# Patient Record
Sex: FEMALE | Race: BLACK OR AFRICAN AMERICAN | NOT HISPANIC OR LATINO | Employment: FULL TIME | URBAN - METROPOLITAN AREA
[De-identification: names, ages, dates, MRNs, and addresses within clinical notes are randomized per-mention and may not be internally consistent; named-entity substitution may affect disease eponyms.]

---

## 2019-10-01 ENCOUNTER — LAB VISIT (OUTPATIENT)
Dept: LAB | Facility: HOSPITAL | Age: 49
End: 2019-10-01
Attending: FAMILY MEDICINE
Payer: COMMERCIAL

## 2019-10-01 ENCOUNTER — HOSPITAL ENCOUNTER (OUTPATIENT)
Dept: RADIOLOGY | Facility: HOSPITAL | Age: 49
Discharge: HOME | End: 2019-10-01
Attending: FAMILY MEDICINE
Payer: COMMERCIAL

## 2019-10-01 DIAGNOSIS — E66.01 MORBID OBESITY: ICD-10-CM

## 2019-10-01 DIAGNOSIS — I83.892 VARICOSE VEINS OF LEG WITH SWELLING, LEFT: ICD-10-CM

## 2019-10-01 DIAGNOSIS — M79.604 RIGHT LEG PAIN: Primary | ICD-10-CM

## 2019-10-01 DIAGNOSIS — M79.89 SWELLING OF LIMB: ICD-10-CM

## 2019-10-01 DIAGNOSIS — M79.605 LEFT LEG PAIN: Primary | ICD-10-CM

## 2019-10-01 DIAGNOSIS — M79.605 LEFT LEG PAIN: ICD-10-CM

## 2019-10-01 DIAGNOSIS — I83.893 SYMPTOMATIC VARICOSE VEINS OF BOTH LOWER EXTREMITIES: ICD-10-CM

## 2019-10-01 LAB
ALBUMIN SERPL BCP-MCNC: 3.6 G/DL (ref 3.5–5.2)
ALP SERPL-CCNC: 81 U/L (ref 55–135)
ALT SERPL W/O P-5'-P-CCNC: 112 U/L (ref 10–44)
ANION GAP SERPL CALC-SCNC: 8 MMOL/L (ref 8–16)
AST SERPL-CCNC: 151 U/L (ref 10–40)
BASOPHILS # BLD AUTO: 0.01 K/UL (ref 0–0.2)
BASOPHILS NFR BLD: 0.2 % (ref 0–1.9)
BILIRUB SERPL-MCNC: 0.5 MG/DL (ref 0.1–1)
BNP SERPL-MCNC: 44 PG/ML (ref 0–99)
BUN SERPL-MCNC: 14 MG/DL (ref 6–20)
CALCIUM SERPL-MCNC: 9 MG/DL (ref 8.7–10.5)
CHLORIDE SERPL-SCNC: 104 MMOL/L (ref 95–110)
CO2 SERPL-SCNC: 28 MMOL/L (ref 23–29)
CREAT SERPL-MCNC: 0.8 MG/DL (ref 0.5–1.4)
DIFFERENTIAL METHOD: ABNORMAL
EOSINOPHIL # BLD AUTO: 0.1 K/UL (ref 0–0.5)
EOSINOPHIL NFR BLD: 1.6 % (ref 0–8)
ERYTHROCYTE [DISTWIDTH] IN BLOOD BY AUTOMATED COUNT: 12.9 % (ref 11.5–14.5)
EST. GFR  (AFRICAN AMERICAN): >60 ML/MIN/1.73 M^2
EST. GFR  (NON AFRICAN AMERICAN): >60 ML/MIN/1.73 M^2
GLUCOSE SERPL-MCNC: 76 MG/DL (ref 70–110)
HCT VFR BLD AUTO: 43.6 % (ref 37–48.5)
HGB BLD-MCNC: 13.5 G/DL (ref 12–16)
LYMPHOCYTES # BLD AUTO: 1.7 K/UL (ref 1–4.8)
LYMPHOCYTES NFR BLD: 34.7 % (ref 18–48)
MCH RBC QN AUTO: 28.9 PG (ref 27–31)
MCHC RBC AUTO-ENTMCNC: 31 G/DL (ref 32–36)
MCV RBC AUTO: 93 FL (ref 82–98)
MONOCYTES # BLD AUTO: 0.4 K/UL (ref 0.3–1)
MONOCYTES NFR BLD: 7.1 % (ref 4–15)
NEUTROPHILS # BLD AUTO: 2.7 K/UL (ref 1.8–7.7)
NEUTROPHILS NFR BLD: 56.4 % (ref 38–73)
PLATELET # BLD AUTO: 102 K/UL (ref 150–350)
PMV BLD AUTO: 11.2 FL (ref 9.2–12.9)
POTASSIUM SERPL-SCNC: 4 MMOL/L (ref 3.5–5.1)
PROT SERPL-MCNC: 7.6 G/DL (ref 6–8.4)
RBC # BLD AUTO: 4.67 M/UL (ref 4–5.4)
SODIUM SERPL-SCNC: 140 MMOL/L (ref 136–145)
TSH SERPL DL<=0.005 MIU/L-ACNC: 2.18 UIU/ML (ref 0.4–4)
URATE SERPL-MCNC: 8.9 MG/DL (ref 2.4–5.7)
WBC # BLD AUTO: 4.9 K/UL (ref 3.9–12.7)

## 2019-10-01 PROCEDURE — 84550 ASSAY OF BLOOD/URIC ACID: CPT | Mod: PO

## 2019-10-01 PROCEDURE — 80061 LIPID PANEL: CPT

## 2019-10-01 PROCEDURE — 83880 ASSAY OF NATRIURETIC PEPTIDE: CPT | Mod: PO

## 2019-10-01 PROCEDURE — 93971 EXTREMITY STUDY: CPT | Mod: TC,PO,LT

## 2019-10-01 PROCEDURE — 93971 US LOWER EXTREMITY VEINS LEFT: ICD-10-PCS | Mod: 26,LT,, | Performed by: RADIOLOGY

## 2019-10-01 PROCEDURE — 83036 HEMOGLOBIN GLYCOSYLATED A1C: CPT

## 2019-10-01 PROCEDURE — 84443 ASSAY THYROID STIM HORMONE: CPT | Mod: PO

## 2019-10-01 PROCEDURE — 36415 COLL VENOUS BLD VENIPUNCTURE: CPT | Mod: PO

## 2019-10-01 PROCEDURE — 85652 RBC SED RATE AUTOMATED: CPT

## 2019-10-01 PROCEDURE — 85025 COMPLETE CBC W/AUTO DIFF WBC: CPT | Mod: PO

## 2019-10-01 PROCEDURE — 93971 EXTREMITY STUDY: CPT | Mod: 26,LT,, | Performed by: RADIOLOGY

## 2019-10-01 PROCEDURE — 80053 COMPREHEN METABOLIC PANEL: CPT | Mod: PO

## 2019-10-02 LAB
CHOLEST SERPL-MCNC: 167 MG/DL (ref 120–199)
CHOLEST/HDLC SERPL: 2.6 {RATIO} (ref 2–5)
ERYTHROCYTE [SEDIMENTATION RATE] IN BLOOD BY WESTERGREN METHOD: 34 MM/HR (ref 0–36)
ESTIMATED AVG GLUCOSE: 111 MG/DL (ref 68–131)
HBA1C MFR BLD HPLC: 5.5 % (ref 4–5.6)
HDLC SERPL-MCNC: 65 MG/DL (ref 40–75)
HDLC SERPL: 38.9 % (ref 20–50)
LDLC SERPL CALC-MCNC: 62.2 MG/DL (ref 63–159)
NONHDLC SERPL-MCNC: 102 MG/DL
TRIGL SERPL-MCNC: 199 MG/DL (ref 30–150)

## 2023-05-25 ENCOUNTER — HOSPITAL ENCOUNTER (EMERGENCY)
Facility: HOSPITAL | Age: 53
Discharge: HOME | End: 2023-05-25
Attending: EMERGENCY MEDICINE

## 2023-05-25 VITALS
HEART RATE: 90 BPM | OXYGEN SATURATION: 98 % | DIASTOLIC BLOOD PRESSURE: 83 MMHG | RESPIRATION RATE: 14 BRPM | SYSTOLIC BLOOD PRESSURE: 149 MMHG | TEMPERATURE: 98 F
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
## 2023-05-25 DIAGNOSIS — M25.522 LEFT ELBOW PAIN: ICD-10-CM

## 2023-05-25 PROCEDURE — 99283 EMERGENCY DEPT VISIT LOW MDM: CPT

## 2023-05-25 RX ORDER — NAPROXEN 500 MG/1
500 TABLET ORAL 2 TIMES DAILY WITH MEALS
Qty: 10 TABLET | Refills: 0 | Status: SHIPPED | OUTPATIENT
Start: 2023-05-25 | End: 2023-05-30

## 2023-05-25 NOTE — Clinical Note
"Selwyn Correa"Vel was seen and treated in our emergency department on 5/25/2023.  She may return to work on 05/27/2023.       If you have any questions or concerns, please don't hesitate to call.      Alvaro Richardson NP"

## 2023-05-25 NOTE — ED PROVIDER NOTES
Encounter Date: 5/25/2023       History     Chief Complaint   Patient presents with    Fall     Pt reports ground level fall this afternoon. Pt CO pain to R FA. Reports chair moved and she fell to floor. Denies dizziness or syncope.     Patient presents to ER for fall that occurred just prior to arrival.  Patient states she was sitting in a rolling chair and she attempted to get out of the chair and chair moved out from underneath her , causing patient to fall on left elbow.  She denies head trauma loss of consciousness.  Pain is been constant since onset.  She denies numbness, tingling, open wound, joint immobility, joint instability.    The history is provided by the patient.   Review of patient's allergies indicates:  No Known Allergies  History reviewed. No pertinent past medical history.  History reviewed. No pertinent surgical history.  History reviewed. No pertinent family history.     Review of Systems   Constitutional:  Negative for chills, fatigue and fever.   Respiratory:  Negative for cough and shortness of breath.    Cardiovascular:  Negative for chest pain.   Gastrointestinal:  Negative for abdominal pain, nausea and vomiting.   Musculoskeletal:  Negative for back pain, myalgias and neck pain.        +left elbow pain   Skin:  Negative for rash and wound.   Neurological:  Negative for weakness, numbness and headaches.   All other systems reviewed and are negative.    Physical Exam     Initial Vitals [05/25/23 1456]   BP Pulse Resp Temp SpO2   (!) 149/83 90 14 97.5 °F (36.4 °C) 98 %      MAP       --         Physical Exam    Nursing note and vitals reviewed.  Constitutional: She appears well-developed and well-nourished. She is not diaphoretic. No distress.   HENT:   Head: Normocephalic and atraumatic.   Eyes: Conjunctivae and EOM are normal.   Neck: Neck supple.   Normal range of motion.  Cardiovascular:  Normal rate, regular rhythm and intact distal pulses.           Pulmonary/Chest: Breath sounds  normal. No respiratory distress.   Musculoskeletal:         General: Normal range of motion.      Right shoulder: Normal.      Left shoulder: Normal.      Right upper arm: Normal.      Left upper arm: Normal.      Left elbow: No swelling, deformity or lacerations. Normal range of motion. Tenderness present.      Right forearm: Normal.      Left forearm: Normal.      Right wrist: Normal.      Left wrist: Normal.      Right hand: Normal.      Left hand: Normal.      Cervical back: Normal range of motion and neck supple.     Neurological: She is alert and oriented to person, place, and time. She has normal strength. No sensory deficit. GCS score is 15. GCS eye subscore is 4. GCS verbal subscore is 5. GCS motor subscore is 6.   Skin: Skin is warm and dry. Capillary refill takes less than 2 seconds.       ED Course   Procedures  Labs Reviewed - No data to display       Imaging Results              X-Ray Elbow Complete Left (Final result)  Result time 05/25/23 15:33:27      Final result by LIV Sanchez Sr., MD (05/25/23 15:33:27)                   Impression:      Normal study.      Electronically signed by: Kai Sanchez MD  Date:    05/25/2023  Time:    15:33               Narrative:    EXAMINATION:  XR ELBOW COMPLETE 3 VIEW LEFT    CLINICAL HISTORY:  Unspecified fall, initial encounter    COMPARISON:  None    FINDINGS:  There is no fracture. There is no dislocation.                                       Medications - No data to display               Imaging results reviewed and discussed with patient she verbalized understanding.  She is neurovascularly intact distally to left upper extremity.  +range of motion to left elbow without difficulty.  Radial pulses +2 and equal bilaterally.  Distal sensation is intact.  Discussed rest, ice, elevation, compression.  Naproxen Rx provided.  Patient agrees with plan and voices no further concerns.  Discussed signs and symptoms to return to ER.  Discussed outpatient  follow-up with her PCP.            Clinical Impression:   Final diagnoses:  [W19.XXXA] Fall  [M25.522] Left elbow pain        ED Disposition Condition    Discharge Stable          ED Prescriptions       Medication Sig Dispense Start Date End Date Auth. Provider    naproxen (NAPROSYN) 500 MG tablet Take 1 tablet (500 mg total) by mouth 2 (two) times daily with meals. for 5 days 10 tablet 5/25/2023 5/30/2023 Alvaro Richardson NP          Follow-up Information       Follow up With Specialties Details Why Contact Info    Follow-up with your PCP in 2 days.        [facility]. Emergency Medicine  As needed, If symptoms worsen [address] [facility] [address] [phone]             Alvaro Richardson NP  05/25/23 9089

## 2023-08-28 ENCOUNTER — HOSPITAL ENCOUNTER (EMERGENCY)
Facility: HOSPITAL | Age: 53
Discharge: HOME | End: 2023-08-28
Attending: EMERGENCY MEDICINE

## 2023-08-28 VITALS
HEIGHT: 64 IN | DIASTOLIC BLOOD PRESSURE: 66 MMHG | HEART RATE: 89 BPM | SYSTOLIC BLOOD PRESSURE: 164 MMHG | WEIGHT: 258.63 LBS | RESPIRATION RATE: 18 BRPM | BODY MASS INDEX: 44.15 KG/M2 | TEMPERATURE: 99 F | OXYGEN SATURATION: 99 %

## 2023-08-28 DIAGNOSIS — Z20.822 CLOSE EXPOSURE TO COVID-19 VIRUS: Primary | ICD-10-CM

## 2023-08-28 DIAGNOSIS — R06.02 SHORTNESS OF BREATH: ICD-10-CM

## 2023-08-28 LAB — SARS-COV-2 RDRP RESP QL NAA+PROBE: NEGATIVE

## 2023-08-28 PROCEDURE — 99283 EMERGENCY DEPT VISIT LOW MDM: CPT | Mod: 25

## 2023-08-28 PROCEDURE — U0002 COVID-19 LAB TEST NON-CDC: HCPCS | Performed by: NURSE PRACTITIONER

## 2023-08-28 NOTE — Clinical Note
"Selwyn Correa" Vel was seen and treated in our emergency department on 8/28/2023.  She may return to work on 08/30/2023.       If you have any questions or concerns, please don't hesitate to call.      Staci Swift, MICHAELA"

## 2023-08-29 NOTE — ED PROVIDER NOTES
History      Chief Complaint   Patient presents with    COVID-19 Concerns     Pt exposed to 5 patients with covid, reports feeling generalized body aches, SOB, and cough       Review of patient's allergies indicates:  No Known Allergies     HPI   HPI    8/28/2023, 9:07 PM   History obtained from the patient      History of Present Illness: Selwyn Crowder is a 53 y.o. female patient who presents to the Emergency Department for COVID symptoms.  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   She says that she came in because her employer told her that she needed a positive result from [facility].  No further complaints or concerns at this time.           PCP: No, Primary Doctor       Past Medical History:  No past medical history on file.      Past Surgical History:  No past surgical history on file.        Family History:  No family history on file.        Social History:  Social History     Tobacco Use    Smoking status: Not on file    Smokeless tobacco: Not on file   Substance and Sexual Activity    Alcohol use: Not on file    Drug use: Not on file    Sexual activity: Not on file       ROS     Review of Systems   Respiratory:  Positive for cough.    Cardiovascular:  Negative for chest pain.   Musculoskeletal:  Positive for arthralgias and myalgias.       Physical Exam      Initial Vitals [08/28/23 1958]   BP Pulse Resp Temp SpO2   (!) 164/66 89 18 99.1 °F (37.3 °C) 99 %      MAP       --         Physical Exam  Vital signs and nursing notes reviewed.  Constitutional: Patient is in NAD. Awake and alert. Well-developed and well-nourished.  Head: Atraumatic. Normocephalic.  Eyes:  EOM intact. Conjunctivae nl. No scleral icterus.  ENT: Mucous membranes are moist.   Neck: Supple.  No meningismus  Cardiovascular: Regular rate and rhythm. No murmurs, rubs, or gallops.   Pulmonary/Chest: No respiratory distress. Clear to auscultation bilaterally. No  "wheezing, rales, or rhonchi.  Abdominal: Soft. Non-distended. No TTP. No rebound, guarding, or rigidity. Good bowel sounds.  Genitourinary: No CVA tenderness  Musculoskeletal: Moves all extremities.   Skin: Warm and dry.  Neurological: Awake and alert. No acute focal neurological deficits are appreciated.  Psychiatric: Normal affect. Good eye contact. Appropriate in content.      ED Course          Procedures  ED Vital Signs:  Vitals:    08/28/23 1958   BP: (!) 164/66   Pulse: 89   Resp: 18   Temp: 99.1 °F (37.3 °C)   TempSrc: Oral   SpO2: 99%   Weight: 117.3 kg (258 lb 9.6 oz)   Height: 5' 4" (1.626 m)         Results for orders placed or performed during the hospital encounter of 08/28/23   COVID-19 Rapid Screening   Result Value Ref Range    SARS-CoV-2 RNA, Amplification, Qual Negative Negative             Imaging Results:  Imaging Results              X-Ray Chest 1 View (Final result)  Result time 08/28/23 20:30:20      Final result by Mark Wesley MD (08/28/23 20:30:20)                   Impression:      No acute abnormality.      Electronically signed by: Mark Wesley  Date:    08/28/2023  Time:    20:30               Narrative:    EXAMINATION:  XR CHEST 1 VIEW    CLINICAL HISTORY:  Shortness of breath    TECHNIQUE:  Single frontal view of the chest was performed.    COMPARISON:  None    FINDINGS:  The lungs are clear, with normal appearance of pulmonary vasculature and no pleural effusion or pneumothorax.    The cardiac silhouette is normal in size. The hilar and mediastinal contours are unremarkable.    Bones are intact.                                         The Emergency Provider reviewed the vital signs and test results, which are outlined above.    ED Discussion             Medication(s) given in the ER:  Medications - No data to display         Follow-up Information       Your Primary Care Doctor In 2 days.               [facility]..    Specialty: Emergency Medicine  Why: If symptoms " worsen  Contact information:  [address] [facility] [address] [phone]                                  Medication List      You have not been prescribed any medications.             Medical Decision Making        All findings were reviewed with the patient/family in detail.   All remaining questions and concerns were addressed at that time.  Patient/family has been counseled regarding the need for follow-up as well as the indication to return to the emergency room should new or worrisome developments occur.        MDM     Amount and/or Complexity of Data Reviewed  Clinical lab tests: ordered and reviewed (COVID negative)  Tests in the radiology section of CPT®: ordered and reviewed                   Clinical Impression:        ICD-10-CM ICD-9-CM   1. Close exposure to COVID-19 virus  Z20.822 V01.79   2. Shortness of breath  R06.02 786.05               Staci Swift PA-C  08/28/23 2125       Staci Swift PA-C  08/28/23 2126

## 2023-08-29 NOTE — FIRST PROVIDER EVALUATION
"Medical screening examination initiated.  I have conducted a focused provider triage encounter, findings are as follows:    Brief history of present illness:  Patient presents with body aches and shortness of breath after being exposed to 5 patients with COVID yesterday.  Onset of symptoms was today.    Vitals:    08/28/23 1958   BP: (!) 164/66   Pulse: 89   Resp: 18   Temp: 99.1 °F (37.3 °C)   TempSrc: Oral   SpO2: 99%   Weight: 117.3 kg (258 lb 9.6 oz)   Height: 5' 4" (1.626 m)       Pertinent physical exam:  No acute distress noted    Brief workup plan:  Labs and imaging    Preliminary workup initiated; this workup will be continued and followed by the physician or advanced practice provider that is assigned to the patient when roomed.  "

## 2023-09-22 ENCOUNTER — OFFICE VISIT (OUTPATIENT)
Dept: URGENT CARE | Facility: CLINIC | Age: 53
End: 2023-09-22
Payer: OTHER MISCELLANEOUS

## 2023-09-22 VITALS
HEART RATE: 84 BPM | BODY MASS INDEX: 44.05 KG/M2 | OXYGEN SATURATION: 98 % | WEIGHT: 258 LBS | HEIGHT: 64 IN | SYSTOLIC BLOOD PRESSURE: 128 MMHG | RESPIRATION RATE: 20 BRPM | TEMPERATURE: 99 F | DIASTOLIC BLOOD PRESSURE: 92 MMHG
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
## 2023-09-22 DIAGNOSIS — S40.022A ARM CONTUSION, LEFT, INITIAL ENCOUNTER: ICD-10-CM

## 2023-09-22 DIAGNOSIS — Z02.83 ENCOUNTER FOR DRUG SCREENING: ICD-10-CM

## 2023-09-22 DIAGNOSIS — S80.02XA CONTUSION OF LEFT KNEE, INITIAL ENCOUNTER: ICD-10-CM

## 2023-09-22 DIAGNOSIS — S86.911A KNEE STRAIN, RIGHT, INITIAL ENCOUNTER: ICD-10-CM

## 2023-09-22 PROCEDURE — 80305 OOH NON-DOT DRUG SCREEN: ICD-10-PCS | Mod: S$GLB,,, | Performed by: PHYSICIAN ASSISTANT

## 2023-09-22 PROCEDURE — 80305 DRUG TEST PRSMV DIR OPT OBS: CPT | Mod: S$GLB,,, | Performed by: PHYSICIAN ASSISTANT

## 2023-09-22 PROCEDURE — 73562 XR KNEE 3 VIEW BILATERAL: ICD-10-PCS | Mod: 50,S$GLB,, | Performed by: RADIOLOGY

## 2023-09-22 PROCEDURE — 73562 X-RAY EXAM OF KNEE 3: CPT | Mod: 50,S$GLB,, | Performed by: RADIOLOGY

## 2023-09-22 PROCEDURE — 99203 PR OFFICE/OUTPT VISIT, NEW, LEVL III, 30-44 MIN: ICD-10-PCS | Mod: S$GLB,,, | Performed by: PHYSICIAN ASSISTANT

## 2023-09-22 NOTE — PATIENT INSTRUCTIONS
Ice, elevate, keep in ACE bandage until pain and swelling have improved. Ibuprofen every 6 hours (with food) as needed for pain.

## 2023-09-22 NOTE — PROGRESS NOTES
Subjective:      Patient ID: Selwyn Crowder is a 53 y.o. female.    Chief Complaint: Fall    Patient's place of employment - [employer]  Patient's job title - CNA  Date of injury - 09/21/2023  Body part injured including left or right - left arm and bilateral knee  pain  Injury Mechanism - fall  What they were doing when they got hurt - walking down hallway  What they did immediately after - sat for a minute or so, then used rails to pull herself up  Pain scale right now - 8/10    Pt states she was walking down the hallway at work and slipped on wet floor. Fell onto bilateral knees and left forearm. There was no head injury or LOC. No neck or back pain. Can ambulate without difficulty. C/o bruising to left forearm and left knee. No hx of knee issues     Fall  The accident occurred 12 to 24 hours ago (Around 3pm yesterday). The fall occurred while walking. She fell from a height of 1 to 2 ft. She landed on Hard floor. There was no blood loss. The point of impact was the right knee and left knee (left arm). The pain is present in the right knee, left knee and right lower arm. The pain is at a severity of 8/10. The pain is moderate. The symptoms are aggravated by ambulation. Pertinent negatives include no abdominal pain, bowel incontinence, fever, headaches, hearing loss, hematuria, loss of consciousness, nausea, numbness, tingling, visual change or vomiting. She has tried nothing for the symptoms. The treatment provided no relief.       Constitution: Negative for fever.   Gastrointestinal:  Negative for abdominal pain, nausea, vomiting and bowel incontinence.   Genitourinary:  Negative for hematuria.   Musculoskeletal:  Positive for pain, trauma, joint pain and joint swelling.   Neurological:  Negative for headaches, loss of consciousness, numbness and tingling.     Objective:     Physical Exam  Vitals and nursing note reviewed.   Constitutional:       General: She is not in acute distress.      Appearance: She is well-developed. She is obese.   HENT:      Head: Normocephalic and atraumatic.      Nose: Nose normal.      Mouth/Throat:      Pharynx: Oropharynx is clear.   Eyes:      Pupils: Pupils are equal, round, and reactive to light.   Cardiovascular:      Rate and Rhythm: Normal rate and regular rhythm.      Pulses:           Dorsalis pedis pulses are 2+ on the right side and 2+ on the left side.      Heart sounds: Normal heart sounds.   Pulmonary:      Effort: Pulmonary effort is normal. No respiratory distress.      Breath sounds: Normal breath sounds.   Abdominal:      General: Bowel sounds are normal.      Palpations: Abdomen is soft.      Tenderness: There is no abdominal tenderness.   Musculoskeletal:         General: No deformity. Normal range of motion.      Cervical back: Normal range of motion and neck supple.      Comments: Left medial forearm 3 cm contusion. No deformity or step offs. FROM left wrist, elbow and shoulder. Normal sensation 2+ LUE radial and ulnar pulses.    Bilateral knees - exam limited by body habitus. There is no crepitus, deformity or ligamentous laxity. FROM active and passive. Normal sensation and pulses distally. Left knee with 1 inch contusion   Skin:     General: Skin is warm and dry.   Neurological:      Mental Status: She is alert and oriented to person, place, and time.   Psychiatric:         Behavior: Behavior normal.      XR KNEE 3 VIEW BILATERAL    Result Date: 9/22/2023  EXAM:    XR KNEE 3 VIEW BILATERAL CLINICAL HISTORY:  Pain status post fall COMPARISON: None FINDINGS:  This examination consists of 3 views of each knee.  There is no fracture.  There is no dislocation.  There is a moderate size joint effusion in the right knee.     1.  No fracture or dislocation 2.  There is a moderate size joint effusion in the right knee.  If additional imaging evaluation is clinically indicated, recommend consideration of an MRI examination of the right knee without IV  contrast. Finalized on: 9/22/2023 11:35 AM By:  Kai Sanchez MD [facility]# [id number]      2023-09-22 11:37:25.049    [facility]        Assessment:      1. Fall, initial encounter    2. Contusion of left knee, initial encounter    3. Knee strain, right, initial encounter    4. Arm contusion, left, initial encounter      Plan:        ACE bandage to bilateral knees - unable to place in knee brace due to body habitus. Recommend OTC nsaids. Ok to return to work, more frequent breaks as necessary.      Ana Muller PA-C  [facility]       Patient Instructions   Ice, elevate, keep in ACE bandage until pain and swelling have improved. Ibuprofen every 6 hours (with food) as needed for pain.

## 2024-03-15 ENCOUNTER — HOSPITAL ENCOUNTER (EMERGENCY)
Facility: HOSPITAL | Age: 54
Discharge: HOME | End: 2024-03-15
Attending: EMERGENCY MEDICINE
Payer: COMMERCIAL

## 2024-03-15 VITALS
RESPIRATION RATE: 18 BRPM | SYSTOLIC BLOOD PRESSURE: 192 MMHG | HEART RATE: 98 BPM | TEMPERATURE: 98 F | HEIGHT: 64 IN | WEIGHT: 256.38 LBS | BODY MASS INDEX: 43.77 KG/M2 | OXYGEN SATURATION: 99 % | DIASTOLIC BLOOD PRESSURE: 81 MMHG

## 2024-03-15 DIAGNOSIS — M19.071 PRIMARY OSTEOARTHRITIS OF RIGHT FOOT: ICD-10-CM

## 2024-03-15 DIAGNOSIS — M79.671 RIGHT FOOT PAIN: ICD-10-CM

## 2024-03-15 DIAGNOSIS — M77.31 CALCANEAL SPUR OF RIGHT FOOT: Primary | ICD-10-CM

## 2024-03-15 PROCEDURE — 99283 EMERGENCY DEPT VISIT LOW MDM: CPT | Mod: 25

## 2024-03-15 PROCEDURE — 25000003 PHARM REV CODE 250: Performed by: NURSE PRACTITIONER

## 2024-03-15 RX ORDER — KETOROLAC TROMETHAMINE 10 MG/1
10 TABLET, FILM COATED ORAL
Status: COMPLETED | OUTPATIENT
Start: 2024-03-15 | End: 2024-03-15

## 2024-03-15 RX ORDER — DICLOFENAC SODIUM 50 MG/1
50 TABLET, DELAYED RELEASE ORAL 3 TIMES DAILY PRN
Qty: 15 TABLET | Refills: 0 | Status: SHIPPED | OUTPATIENT
Start: 2024-03-15 | End: 2024-06-14 | Stop reason: SDUPTHER

## 2024-03-15 RX ADMIN — KETOROLAC TROMETHAMINE 10 MG: 10 TABLET, FILM COATED ORAL at 09:03

## 2024-03-15 NOTE — Clinical Note
"Selwyn Crowder (Toni) was seen and treated in our emergency department on 3/15/2024.  She may return to work on 03/19/2024.       If you have any questions or concerns, please don't hesitate to call.      Phill TAYLOR RN    "

## 2024-03-16 NOTE — ED PROVIDER NOTES
HISTORY     Chief Complaint   Patient presents with    Foot Pain     Right foot pain x 1 week, pt states she had glass in great toe and she tried to get it out with no success     Review of patient's allergies indicates:  No Known Allergies     HPI   The history is provided by the patient.   Foot Injury   The injury mechanism is unknown. The incident occurred several weeks ago. The pain is present in the right foot. The quality of the pain is described as aching and burning. The pain is at a severity of 5/10. The pain has been Constant since onset. Pertinent negatives include no numbness, no inability to bear weight, no loss of motion, no muscle weakness, no loss of sensation and no tingling. She reports no foreign bodies present. Nothing aggravates the symptoms. She has tried nothing for the symptoms. The treatment provided no relief.        PCP: No, Primary Doctor     Past Medical History:  No past medical history on file.     Past Surgical History:  Past Surgical History:   Procedure Laterality Date    CHOLECYSTECTOMY          Family History:  No family history on file.     Social History:  Social History     Tobacco Use    Smoking status: Never    Smokeless tobacco: Never   Substance and Sexual Activity    Alcohol use: Yes    Drug use: Never    Sexual activity: Not Currently         ROS   Review of Systems   Constitutional:  Negative for fever.   HENT:  Negative for sore throat.    Respiratory:  Negative for shortness of breath.    Cardiovascular:  Negative for chest pain.   Gastrointestinal:  Negative for nausea.   Genitourinary:  Negative for dysuria.   Musculoskeletal:  Negative for back pain.        Right foot pain.    Skin:  Negative for rash.   Neurological:  Negative for tingling, weakness and numbness.   Hematological:  Does not bruise/bleed easily.       PHYSICAL EXAM     Initial Vitals [03/15/24 1945]   BP Pulse Resp Temp SpO2   (!) 192/81 98 18 97.8 °F (36.6 °C) 99 %      MAP       --      "      Physical Exam    Constitutional: She appears well-developed and well-nourished. She is Obese . No distress.   HENT:   Head: Normocephalic and atraumatic.   Eyes: Conjunctivae are normal. Pupils are equal, round, and reactive to light.   Neck: Neck supple.   Normal range of motion.  Cardiovascular:  Normal rate, regular rhythm and normal heart sounds.           Pulmonary/Chest: Breath sounds normal.   Abdominal: Abdomen is soft. Bowel sounds are normal. She exhibits no distension. There is no abdominal tenderness. There is no rebound.   Musculoskeletal:         General: No edema. Normal range of motion.      Cervical back: Normal range of motion and neck supple.        Feet:      Neurological: She is alert and oriented to person, place, and time. She has normal strength.   Skin: Skin is warm and dry.        Psychiatric: She has a normal mood and affect.          ED COURSE   Procedures  ED ONGOING VITALS:  Vitals:    03/15/24 1945   BP: (!) 192/81   Pulse: 98   Resp: 18   Temp: 97.8 °F (36.6 °C)   TempSrc: Oral   SpO2: 99%   Weight: 116.3 kg (256 lb 6.3 oz)   Height: 5' 4" (1.626 m)         ABNORMAL LAB VALUES:  Labs Reviewed - No data to display      ALL LAB VALUES:        RADIOLOGY STUDIES:  Imaging Results              X-Ray Foot Complete Right (Final result)  Result time 03/15/24 20:33:43      Final result by Mark Wesley MD (03/15/24 20:33:43)                   Impression:      AS ABOVE      Electronically signed by: Mark Wesley  Date:    03/15/2024  Time:    20:33               Narrative:    EXAMINATION:  XR FOOT COMPLETE 3 VIEW RIGHT    CLINICAL HISTORY:  . Pain in right foot    TECHNIQUE:  AP, lateral, and oblique views of the right foot were performed.    COMPARISON:  None    FINDINGS:  SPURRING OF THE CALCANEUS POSTERIORLY AND ALONG PLANTAR ASPECT.  DORSAL MIDFOOT CORTICAL NODULARITY CONSISTENT WITH DEGENERATIVE JOINT DISEASE.  MEDIAL ANKLE WELL CORTICATED NODULARITY ALONG THE NAVICULAR AND " CALCANEUS MAY RELATE TO OSSICLE WITH QUESTION OLD INJURY.  CORTICAL NODULARITY OF THE PROXIMAL PHALANX OF THE 5TH DIGIT.  OTHERWISE NO ACUTE DISPLACED OSSEOUS ABNORMALITY                                                The above vital signs and test results have been reviewed by the emergency provider.     ED Medications:  Discharge Medication List as of 3/15/2024  8:41 PM        START taking these medications    Details   diclofenac (VOLTAREN) 50 MG EC tablet Take 1 tablet (50 mg total) by mouth 3 (three) times daily as needed., Starting Fri 3/15/2024, Print           Discharge Medications:  Discharge Medication List as of 3/15/2024  8:41 PM        START taking these medications    Details   diclofenac (VOLTAREN) 50 MG EC tablet Take 1 tablet (50 mg total) by mouth 3 (three) times daily as needed., Starting Fri 3/15/2024, Print             Follow-up Information       Schedule an appointment as soon as possible for a visit  with PCP.               [facility] - Emergency Dept..    Specialty: Emergency Medicine  Contact information:  [id number] [place] [address] [phone]                          I discussed with patient and/or family/caretaker that evaluation in the ED does not suggest any emergent or life threatening medical conditions requiring immediate intervention beyond what was provided in the ED, and I believe patient is safe for discharge. Regardless, an unremarkable evaluation in the ED does not preclude the development or presence of a serious or life threatening condition. As such, patient was instructed to return immediately for any worsening or change in current symptoms.    Pre-hypertension/Hypertension: The pt has been informed that they may have pre-hypertension or hypertension based on a blood pressure reading in the ED. I recommend that the pt call the PCP listed on their discharge instructions or a physician of their choice this week to arrange f/u for further  evaluation of possible pre-hypertension or hypertension.       MEDICAL DECISION MAKING                 CLINICAL IMPRESSION       ICD-10-CM ICD-9-CM   1. Calcaneal spur of right foot  M77.31 726.73   2. Right foot pain  M79.671 729.5   3. Primary osteoarthritis of right foot  M19.071 715.17       Disposition:   Disposition: Discharged  Condition: Stable         Fredy Jain NP  03/16/24 0978

## 2024-03-19 ENCOUNTER — OFFICE VISIT (OUTPATIENT)
Dept: PODIATRY | Facility: CLINIC | Age: 54
End: 2024-03-19
Payer: COMMERCIAL

## 2024-03-19 DIAGNOSIS — E66.01 MORBID OBESITY: ICD-10-CM

## 2024-03-19 DIAGNOSIS — M77.31 CALCANEAL SPUR OF RIGHT FOOT: ICD-10-CM

## 2024-03-19 DIAGNOSIS — M19.071 PRIMARY OSTEOARTHRITIS OF RIGHT FOOT: ICD-10-CM

## 2024-03-19 DIAGNOSIS — M25.571 PAIN IN JOINT INVOLVING RIGHT ANKLE AND FOOT: Primary | ICD-10-CM

## 2024-03-19 PROCEDURE — 99204 OFFICE O/P NEW MOD 45 MIN: CPT | Mod: S$GLB,,, | Performed by: PODIATRIST

## 2024-03-19 PROCEDURE — 99999 PR PBB SHADOW E&M-EST. PATIENT-LVL III: CPT | Mod: PBBFAC,,, | Performed by: PODIATRIST

## 2024-03-19 PROCEDURE — 1160F RVW MEDS BY RX/DR IN RCRD: CPT | Mod: CPTII,S$GLB,, | Performed by: PODIATRIST

## 2024-03-19 PROCEDURE — 1159F MED LIST DOCD IN RCRD: CPT | Mod: CPTII,S$GLB,, | Performed by: PODIATRIST

## 2024-03-19 NOTE — LETTER
March 19, 2024      [facility]  [address] [facility]  [place] [address]  Phone: [phone]  Fax: [phone]       Patient: Selwyn Crowder   YOB: 1970  Date of Visit: 03/19/2024    To Whom It May Concern:    Dawit Crowder  was at [facility] on 03/19/2024. The patient may return to work  on 03/22/2024 with restrictions walking. If you have any questions or concerns, or if I can be of further assistance, please do not hesitate to contact me.    Sincerely,          Shahnaz Lay MA

## 2024-03-19 NOTE — PROGRESS NOTES
Subjective:       Patient ID: Selwyn Crowder is a 54 y.o. female.    Chief Complaint: Heel Spurs (Heel spurs, rates pain 8, nondiabetic. Last seen urgent care )      HPI: Selwyn Crowder relates moderate to severe heel pain on the right foot.  She reports that a proximally 4 weeks ago, she fell at work after she tripped over a trash can.  Relates that she has been dealing with pain for proximally of the last 3 weeks.  She reports that she has not been to work during that period of time.  Has been evaluated by the emergency room and was told that she had spurs to her heel.  She is able to ambulate with regular shoes.  States pain has been constant.     Review of patient's allergies indicates:  No Known Allergies    History reviewed. No pertinent past medical history.    History reviewed. No pertinent family history.    Social History     Socioeconomic History    Marital status:    Tobacco Use    Smoking status: Never    Smokeless tobacco: Never   Substance and Sexual Activity    Alcohol use: Yes    Drug use: Never    Sexual activity: Not Currently       Past Surgical History:   Procedure Laterality Date    CHOLECYSTECTOMY         Review of Systems      Objective:   There were no vitals taken for this visit.    X-Ray Foot Complete Right  Narrative: EXAMINATION:  XR FOOT COMPLETE 3 VIEW RIGHT    CLINICAL HISTORY:  . Pain in right foot    TECHNIQUE:  AP, lateral, and oblique views of the right foot were performed.    COMPARISON:  None    FINDINGS:  SPURRING OF THE CALCANEUS POSTERIORLY AND ALONG PLANTAR ASPECT.  DORSAL MIDFOOT CORTICAL NODULARITY CONSISTENT WITH DEGENERATIVE JOINT DISEASE.  MEDIAL ANKLE WELL CORTICATED NODULARITY ALONG THE NAVICULAR AND CALCANEUS MAY RELATE TO OSSICLE WITH QUESTION OLD INJURY.  CORTICAL NODULARITY OF THE PROXIMAL PHALANX OF THE 5TH DIGIT.  OTHERWISE NO ACUTE DISPLACED OSSEOUS ABNORMALITY  Impression: AS ABOVE    Electronically signed by: Mark  Maryjane  Date:    03/15/2024  Time:    20:33      Physical Exam  LOWER EXTREMITY PHYSICAL EXAMINATION    VASCULAR: The right DP pulse is 2/4 and the left DP is 2/4. The right PT pulse is 2/4 and the left PT pulse is 2/4. Proximal to distal, warm to warm. No dependent rubor or elevation palor is noted. Capillary refill time is less than 3 seconds. Hair growth is appreciated to the dorsal foot and digits.    NEUROLOGY: Proprioception is intact, bilateral. Sensation to light touch is intact. Negative Tinel's Sign and negative Valleix Sign. No neurological sensations with compression of the area of Lora's Nerve in the area of the Abductor Hallucis muscle belly.    ORTHOPEDIC:  Moderate pain on palpation of the superior aspect of the posterior heel, plantar aspect of the posterior heel, medial aspect of the right foot and ankle.  Pain also noted diffusely over the dorsal aspect of the foot distally as well.  Patient has moderate pain with medial to lateral squeeze of the calcaneus.  She is able to ambulate with slip-on sandals.    DERMATOLOGY: No ecchymosis is noted.  Skin is supple, dry and intact. Skin is supple.  No hyperkeratosis noted. No calluses.  No open wounds or ulcerations are noted.  No palpable plantar fibromas noted.    Assessment:     1. Pain in joint involving right ankle and foot    2. Calcaneal spur of right foot    3. Primary osteoarthritis of right foot    4. Morbid obesity          Plan:     Pain in joint involving right ankle and foot  -     CT Ankle (Including Hindfoot) Without Contrast Right; Future; Expected date: 03/26/2024    Calcaneal spur of right foot  -     Ambulatory referral/consult to Podiatry    Primary osteoarthritis of right foot  -     Ambulatory referral/consult to Podiatry    Morbid obesity        Thorough discussion is had with the patient today concerning the diagnosis, its etiology, and the treatment algorithm at present.    I explained to the patient that etiology and all  treatment options for heel pain including rest,  ice messages, stretching exercises, strappings/tappings, NSAID's, injections, new shoegear with orthotic inserts, and/or surgical treatment. I also discussed a possible injection of steroid to help calm down the inflammation sooner. I expressed the importance of wearing the orthotics in culmination of other treatment modalities. Patient agreed to stretching exercises and inserts. I gave written and verbal instructions on heel cord stretching and this was demonstrated for the patient. Patient expressed understanding and had the patient teach back the instructions.  Patient instructed on adequate icing techniques which should be done for acute pain and inflammation.    Discussed the importance of stretching to the posterior muscle groups of the gastrocnemius and the soleus.  A stretching sheet was provided to the patient in conjunction with a Thera-Band.  I do recommend patient perform stretching exercises 4-6 times per day and holding the stretches for approximately 15-30 seconds apiece.  We discussed importance of stretching as relates to lengthening the posterior muscle group which can decrease drain on the posterior aspect of the heel as well as the plantar aspect of the heel.  This will also decrease pain associated with post static dyskinesia.  Teach back mechanism was performed with the patient demonstrating the stretching exercises.    Given patient's diffuse pain and multiple etiologies of pain triggers to palpation, would recommend CT scan to evaluate any underlying pathology such as fractures, stress fractures, or spurring which can lead to her increase in pain.    Has been prescribed anti-inflammatories at ED on 03/15/2024.  This should suffice in reducing her pain and inflammatory conditions.     Return to work is based on the patient as physician recommendation was not made initially    Future Appointments   Date Time Provider Department Center   3/21/2024   8:30 AM [facility] CT1 LIMIT 500 LBS [facility] CT SCAN [place]

## 2024-03-21 ENCOUNTER — TELEPHONE (OUTPATIENT)
Dept: PODIATRY | Facility: CLINIC | Age: 54
End: 2024-03-21
Payer: COMMERCIAL

## 2024-03-21 ENCOUNTER — HOSPITAL ENCOUNTER (OUTPATIENT)
Dept: RADIOLOGY | Facility: HOSPITAL | Age: 54
Discharge: HOME | End: 2024-03-21
Attending: PODIATRIST
Payer: COMMERCIAL

## 2024-03-21 DIAGNOSIS — M25.571 PAIN IN JOINT INVOLVING RIGHT ANKLE AND FOOT: ICD-10-CM

## 2024-03-21 PROCEDURE — 73700 CT LOWER EXTREMITY W/O DYE: CPT | Mod: 26,RT,, | Performed by: RADIOLOGY

## 2024-03-21 PROCEDURE — 73700 CT LOWER EXTREMITY W/O DYE: CPT | Mod: TC,PO,RT

## 2024-03-21 NOTE — TELEPHONE ENCOUNTER
Unable to hear each other during phone call.    ----- Message from Anselmo Dominguez sent at 3/21/2024  9:41 AM CDT -----  Contact: self  Pt is asking for an return call in reference to needing an letter to specifying what she can and can't do ,please call back at .[phone] Thx CJ

## 2024-03-22 ENCOUNTER — TELEPHONE (OUTPATIENT)
Dept: PODIATRY | Facility: CLINIC | Age: 54
End: 2024-03-22
Payer: COMMERCIAL

## 2024-03-22 DIAGNOSIS — M77.31 CALCANEAL SPUR OF RIGHT FOOT: Primary | ICD-10-CM

## 2024-03-22 DIAGNOSIS — M76.61 ACHILLES TENDINITIS OF RIGHT LOWER EXTREMITY: ICD-10-CM

## 2024-03-22 NOTE — TELEPHONE ENCOUNTER
Attempted to call patient in regards to recent CT scan.  There is no acute fracture, stress fracture or complications noted.  There is some presence of old injury but no acute pathology present.

## 2024-03-22 NOTE — TELEPHONE ENCOUNTER
Patient upset and stating she needs a release to work form. Patient informed the physician never advised her to stop working. He released her to work based on his findings from tests and examinations. Informed her it was on the paper he made today.Patient raising her voice and abruptly ended the call.      ----- Message from Anselmo Dominguez sent at 3/22/2024  3:02 PM CDT -----  Contact: self  Pt is asking for an return call in reference to needing an DrMargo, excuse to return to work on Monday states she was giving wrong information ,please call back at .[phone] Thx CJ

## 2024-04-28 ENCOUNTER — OFFICE VISIT (OUTPATIENT)
Dept: URGENT CARE | Facility: CLINIC | Age: 54
End: 2024-04-28
Payer: COMMERCIAL

## 2024-04-28 VITALS
BODY MASS INDEX: 48.7 KG/M2 | WEIGHT: 257.94 LBS | TEMPERATURE: 98 F | DIASTOLIC BLOOD PRESSURE: 60 MMHG | RESPIRATION RATE: 18 BRPM | HEIGHT: 61 IN | OXYGEN SATURATION: 100 % | SYSTOLIC BLOOD PRESSURE: 125 MMHG | HEART RATE: 76 BPM

## 2024-04-28 DIAGNOSIS — M54.31 SCIATICA OF RIGHT SIDE WITHOUT BACK PAIN: Primary | ICD-10-CM

## 2024-04-28 PROCEDURE — 99213 OFFICE O/P EST LOW 20 MIN: CPT | Mod: 25,S$GLB,,

## 2024-04-28 PROCEDURE — 96372 THER/PROPH/DIAG INJ SC/IM: CPT | Mod: S$GLB,,,

## 2024-04-28 RX ORDER — NAPROXEN 500 MG/1
500 TABLET ORAL 2 TIMES DAILY
Qty: 14 TABLET | Refills: 0 | Status: SHIPPED | OUTPATIENT
Start: 2024-04-28 | End: 2024-05-05

## 2024-04-28 RX ORDER — KETOROLAC TROMETHAMINE 30 MG/ML
30 INJECTION, SOLUTION INTRAMUSCULAR; INTRAVENOUS
Status: COMPLETED | OUTPATIENT
Start: 2024-04-28 | End: 2024-04-28

## 2024-04-28 RX ORDER — METHYLPREDNISOLONE 4 MG/1
TABLET ORAL
Qty: 21 EACH | Refills: 0 | Status: SHIPPED | OUTPATIENT
Start: 2024-04-28 | End: 2024-05-19

## 2024-04-28 RX ORDER — CYCLOBENZAPRINE HCL 5 MG
5 TABLET ORAL 3 TIMES DAILY PRN
Qty: 15 TABLET | Refills: 0 | Status: SHIPPED | OUTPATIENT
Start: 2024-04-28

## 2024-04-28 RX ADMIN — KETOROLAC TROMETHAMINE 30 MG: 30 INJECTION, SOLUTION INTRAMUSCULAR; INTRAVENOUS at 10:04

## 2024-04-28 NOTE — PATIENT INSTRUCTIONS
Apply RICE technique:   REST, ICE, COMPRESSION, ELEVATION.  Use ice to area for 20 minutes 3-4 times a day for comfort. Can also rotate with use of heat for 20 minutes.   No heavy lifting.  Back stretches as needed for pain relief.  Take Tylenol and/or Ibuprofen (if applicable) as needed for pain.  Apply analgesic rubs or patches such as biofreeze or Tiger Balm.  Go to ER if you have fever of 103 or higher, bowel/bladder incontinence, numbness, tingling, weakness to lower extremities, or if your symptoms worsen in any way.    Follow up with Orthopedics or primary care within 2 weeks if symptoms do not improve.    You received an injection of a powerful NSAID today - Toradol. Its effects will last up to 24 hours. Please do not take another NSAID (i.e. Aspirin, Ibuprofen, Aleve, Advil or Motrin) until this time tomorrow. If you continue to have pain, you may take Tylenol (acetaminophen) if you are not allergic to this medication.    The medication you have been prescribed may cause drowsiness and impair your judgement. Therefore, you should avoid driving, climbing, using machinery, etc., so as not to increase your risk of injury. Do NOT drink any alcohol while on this medication(s).     You received a steroid today. This can elevate your blood pressure, elevate your blood sugar, cause water weight gain, and cause anxiousness. If you received a steroid shot, this in addition may cause dimpling or thinning of the skin.

## 2024-04-28 NOTE — PROGRESS NOTES
"Subjective:      Patient ID: Selwyn Crowder is a 54 y.o. female.    Vitals:  height is 5' 1.42" (1.56 m) and weight is 117 kg (257 lb 15 oz). Her tympanic temperature is 97.7 °F (36.5 °C). Her blood pressure is 125/60 and her pulse is 76. Her respiration is 18 and oxygen saturation is 100%.     Chief Complaint: Leg Pain    Selwyn Crowder is a 54 y.o. female who presents for R leg pain which onset 1 week ago. She notes pain worsened yesterday. C/o an intermittent burning/aching pain. Hx of sciatica. Pain is 10/10 in severity. No injury or trauma. Pain radiates down RLE. No associated sxs included. Patient denies any fever, chills, SOB, CP, n/v/d, bladder/bowel incontinence, weakness, or numbness/tingling. OTC - ibuprofen 200 mg and Excedrin.     Leg Pain   The incident occurred 5 to 7 days ago. There was no injury mechanism. The pain is present in the right leg. The quality of the pain is described as burning and aching. The pain is at a severity of 10/10. The pain is severe. The pain has been Constant since onset. Associated symptoms include an inability to bear weight and muscle weakness. Pertinent negatives include no loss of motion, loss of sensation, numbness or tingling. Associated symptoms comments: Burning sensation . She reports no foreign bodies present. The symptoms are aggravated by movement and weight bearing. She has tried acetaminophen, immobilization and NSAIDs for the symptoms. The treatment provided no relief.       Constitution: Negative for chills and fever.   Musculoskeletal:  Positive for pain (R leg). Negative for trauma, joint pain, joint swelling, abnormal ROM of joint, muscle cramps and muscle ache.   Neurological:  Negative for numbness and tingling.      Objective:     Physical Exam   Constitutional: She is oriented to person, place, and time. She appears well-developed. She is cooperative. No distress.   HENT:   Head: Normocephalic and atraumatic.   Nose: Nose normal. "   Mouth/Throat: Oropharynx is clear and moist and mucous membranes are normal.   Eyes: Conjunctivae and lids are normal.   Neck: Trachea normal and phonation normal. Neck supple.   Cardiovascular: Normal rate, regular rhythm, normal heart sounds and normal pulses.   Pulmonary/Chest: Effort normal and breath sounds normal.   Abdominal: Normal appearance and bowel sounds are normal. She exhibits no mass. Soft.   Musculoskeletal:         General: No deformity.      Comments: R gluteal and R hip tenderness to palpation. No lumbar paraspinal tenderness. No lumbar midline tenderness. No deformity or step offs. Limited ROM due to pain. Sensation intact. NVI. (+) R SLR.   Neurological: She is alert and oriented to person, place, and time. She has normal strength and normal reflexes. No sensory deficit.   Skin: Skin is warm, dry, intact and not diaphoretic.   Psychiatric: Her speech is normal and behavior is normal. Judgment and thought content normal.   Nursing note and vitals reviewed.      Assessment:     1. Sciatica of right side without back pain        Plan:       Sciatica of right side without back pain  -     ketorolac injection 30 mg  -     naproxen (NAPROSYN) 500 MG tablet; Take 1 tablet (500 mg total) by mouth 2 (two) times daily. for 7 days  Dispense: 14 tablet; Refill: 0  -     cyclobenzaprine (FLEXERIL) 5 MG tablet; Take 1 tablet (5 mg total) by mouth 3 (three) times daily as needed for Muscle spasms.  Dispense: 15 tablet; Refill: 0  -     methylPREDNISolone (MEDROL DOSEPACK) 4 mg tablet; use as directed  Dispense: 21 each; Refill: 0      Afebrile. VSS.  Patient has no red flag symptoms such as saddle anesthesia, bowel/bladder incontinence, no personal hx of cancer, no unexplained weight loss, fevers or chills.  Imaging not indicated based on physical exam and history.    Advised patient on mild stretching exercises to alleviate symptoms.   Toradol IM given in clinic. CBC and CMP reviewed. Kidney function  reviewed.  Patient can also take Tylenol as needed for back pain.    Meds: Naproxen, Flexeril, and medrol dose pack sent to prescribed pharmacy to take as needed for muscle pain and spasms.   Return to clinic or follow-up with PCP if symptoms do not resolve.    Strict ER precautions given for back pain including numbness, tingling, saddle anesthesia, bowel or bladder incontinence or worsening back pain that does not resolve with treatment regimen.   Patient agreed with treatment plan and has no further questions at this time.

## 2024-04-28 NOTE — LETTER
April 28, 2024      [facility] [address] [place] [address]  Phone: [phone]  Fax: [phone]       Patient: Selwyn Crowder   YOB: 1970  Date of Visit: 04/28/2024    To Whom It May Concern:    Dawit Crowder  was at [facility] on 04/28/2024. The patient may return to work/school on 05/01/2024 with no restrictions. If you have any questions or concerns, or if I can be of further assistance, please do not hesitate to contact me.    Sincerely,    Orquidea Blue PA-C

## 2024-04-30 ENCOUNTER — TELEPHONE (OUTPATIENT)
Dept: URGENT CARE | Facility: CLINIC | Age: 54
End: 2024-04-30
Payer: COMMERCIAL

## 2024-04-30 DIAGNOSIS — M54.31 RIGHT SIDED SCIATICA: Primary | ICD-10-CM

## 2024-04-30 RX ORDER — GABAPENTIN 300 MG/1
300 CAPSULE ORAL 3 TIMES DAILY
Qty: 90 CAPSULE | Refills: 0 | Status: SHIPPED | OUTPATIENT
Start: 2024-04-30 | End: 2024-06-06

## 2024-04-30 NOTE — TELEPHONE ENCOUNTER
----- Message from RT Fernando sent at 4/30/2024  9:40 AM CDT -----  Regarding: Medical advice  Contact: [phone] pt    ----- Message -----  From: Remedios Jurado  Sent: 4/30/2024   9:25 AM CDT  To: #    [email or web address]     Patient is calling for Medical Advice regarding:Pt states leg isn't getting better, pain on right side from hip to foot. Also has a burning sensation and unable to walk due to the pain.    How long has patient had these symptoms:2 days    Pharmacy name and phone#:  [id number] - [place] - [address]  [address]  [place] [id number]  Phone: [phone] Fax: [phone]       Would like response via [employer]:  call back    Comments:Please call and advise    Provider Returned call 4/30/24 10:20 am. Pt states severe right leg burning pain extending from hip to ankle causing significant issues with ambulating and pain waking her multiple x nightly x 1 week. Given IM toradol in clinic 2 days ago, rx naproxen,flexeril and medrol dose sergio with out relief. Denies past hx of sciatica. Denies bowel/bladder incontinence, saddle area numbness or leg weakness. Denies back pain. Chart reviewed. Rx gabapentin sent to [facility] [place]. Recommend titrate dose up to 3x daily. Discussed side effect profile. Referral placed for back and spine clinic. Ana Muller

## 2024-04-30 NOTE — LETTER
April 30, 2024      [facility] [address] [place] [address]  Phone: [phone]  Fax: [phone]       Patient: Selwyn Crowder   YOB: 1970  Date of Visit: 04/30/2024    To Whom It May Concern:    Dawit Crowder  was at [facility] on 04/30/2024. The patient may return to work/school on 05/2/24 with no restrictions. If you have any questions or concerns, or if I can be of further assistance, please do not hesitate to contact me.    Sincerely,    Ana Muller PA-C  [facility]

## 2024-05-01 ENCOUNTER — TELEPHONE (OUTPATIENT)
Dept: URGENT CARE | Facility: CLINIC | Age: 54
End: 2024-05-01
Payer: COMMERCIAL

## 2024-05-01 NOTE — TELEPHONE ENCOUNTER
Patient seen 3 days ago for flare-up of her sciatica.  She was given Toradol at that time and prescribed Naprosyn and Medrol Dosepak.  She reach back out to the clinic yesterday saying that she had had no relief in her pain.  She was prescribed gabapentin.  Patient has had a single dose of this and is saying that her pain is not being relieved.  I encouraged her to take the gabapentin up to 3 times a day and to contact her PCP or go to the emergency room if this is not adequate.  A referral was placed for back and spine Clinic.  They can not see her until August.

## 2024-05-16 ENCOUNTER — OFFICE VISIT (OUTPATIENT)
Dept: SPINE | Facility: CLINIC | Age: 54
End: 2024-05-16
Payer: COMMERCIAL

## 2024-05-16 VITALS — BODY MASS INDEX: 48.7 KG/M2 | HEIGHT: 61 IN | WEIGHT: 257.94 LBS

## 2024-05-16 DIAGNOSIS — M54.16 RIGHT LUMBAR RADICULITIS: Primary | ICD-10-CM

## 2024-05-16 PROCEDURE — 99999 PR PBB SHADOW E&M-EST. PATIENT-LVL III: CPT | Mod: PBBFAC,,, | Performed by: PHYSICAL MEDICINE & REHABILITATION

## 2024-05-16 PROCEDURE — 3008F BODY MASS INDEX DOCD: CPT | Mod: CPTII,S$GLB,, | Performed by: PHYSICAL MEDICINE & REHABILITATION

## 2024-05-16 PROCEDURE — 1160F RVW MEDS BY RX/DR IN RCRD: CPT | Mod: CPTII,S$GLB,, | Performed by: PHYSICAL MEDICINE & REHABILITATION

## 2024-05-16 PROCEDURE — 1159F MED LIST DOCD IN RCRD: CPT | Mod: CPTII,S$GLB,, | Performed by: PHYSICAL MEDICINE & REHABILITATION

## 2024-05-16 PROCEDURE — 99204 OFFICE O/P NEW MOD 45 MIN: CPT | Mod: S$GLB,,, | Performed by: PHYSICAL MEDICINE & REHABILITATION

## 2024-05-16 RX ORDER — TRAMADOL HYDROCHLORIDE 50 MG/1
50 TABLET ORAL EVERY 6 HOURS PRN
Qty: 28 TABLET | Refills: 0 | Status: SHIPPED | OUTPATIENT
Start: 2024-05-16

## 2024-05-16 NOTE — PROGRESS NOTES
"  SUBJECTIVE:    Patient ID: Selwyn Crowder is a 54 y.o. female.    Chief Complaint: Low-back Pain    This is a 54-year-old woman who has no primary care physician.  Denies any chronic major medical problems.  No cancer history.  No history of back problems.  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  Presents with 1 month history of spontaneously occurring right leg pain that radiates to the right lateral ankle.  No significant low back pain.  No bowel or bladder dysfunction fever chills sweats or unexpected weight loss.  Went to urgent care on 04/28/2024 and received a shot of Toradol and was released on a Medrol Dosepak naproxen and Flexeril.  Later she contacted urgent Care saying that she did not respond to that treatment and gabapentin was added.  Patient does not find gabapentin particularly useful either.  She presents to me with ongoing complaints of right-sided leg pain as described above.  Pain level is 9/10 and interferes with quality of life in terms of activities of daily living recreation and social activities.  I have no imaging to review          History reviewed. No pertinent past medical history.  Social History     Socioeconomic History    Marital status:    Tobacco Use    Smoking status: Never    Smokeless tobacco: Never   Substance and Sexual Activity    Alcohol use: Yes    Drug use: Never    Sexual activity: Not Currently     Past Surgical History:   Procedure Laterality Date    CHOLECYSTECTOMY       No family history on file.  Vitals:    05/16/24 1112   Weight: 117 kg (257 lb 15 oz)   Height: 5' 1.42" (1.56 m)       Review of Systems   Constitutional:  Negative for chills, diaphoresis, fatigue, fever and unexpected weight change.   HENT:  Negative for trouble swallowing.    Eyes:  Negative for visual disturbance.   Respiratory:  Negative for shortness of breath.    Cardiovascular:  Negative for chest pain.   Gastrointestinal:  Negative for abdominal pain, constipation, nausea " and vomiting.   Genitourinary:  Negative for difficulty urinating.   Musculoskeletal:  Negative for arthralgias, back pain, gait problem, joint swelling, myalgias, neck pain and neck stiffness.   Neurological:  Negative for dizziness, speech difficulty, weakness, light-headedness, numbness and headaches.          Objective:      Physical Exam  Neurological:      Mental Status: She is alert and oriented to person, place, and time.      Comments: She is awake and in no acute distress  Mild tenderness to palpation right lumbar paraspinous musculature at the lumbosacral junction with no palpable masses  Forward flexion of the lumbar spine is to about 60° before complaint pain at the lumbosacral junction.  Extension at 10° causes mild pain on the right at the lumbosacral junction  She can toe walk normally.  Heel walking was deferred due to poor balance  Reflexes- +1-+2 reflexes at the following:   C5-Biceps   C6-Brachioradialis   C7-Triceps   L3/4-Patellar   S1-Achilles   Hilary sign is negative bilaterally  Strength testing- 5/5 strength in the following muscle groups:  C5-Elbow flexion  C6-Wrist extension  C7-Elbow extension  C8-Finger flexion  T1-Finger abduction  L2-Hip flexion  L3-Knee extension  L4-Ankle dorsiflexion  L5-Great toe extension  S1-Ankle plantar flexion    Straight leg raise on the right is positive for reproduction of right leg pain.  Straight leg raise on the left is negative                Assessment:       1. Right lumbar radiculitis           Plan:     She has a nonfocal neurological examination and no historical red flags.  She does however have a positive straight leg raise on the right.  She has not responded to reasonable conservative treatment.  I recommend an MRI of the lumbar spine.  She may be a candidate for epidural steroid injections.  In the meantime continue gabapentin and add tramadol as needed for pain.  Cautioned against long-term use of this medication.  We gave her a note to  be on light duty until further notice.  Follow-up with me after the scan      Right lumbar radiculitis  -     MRI Lumbar Spine Without Contrast; Future; Expected date: 05/16/2024  -     traMADoL (ULTRAM) 50 mg tablet; Take 1 tablet (50 mg total) by mouth every 6 (six) hours as needed for Pain.  Dispense: 28 tablet; Refill: 0

## 2024-05-16 NOTE — H&P (VIEW-ONLY)
"  SUBJECTIVE:    Patient ID: Selwyn Crowder is a 54 y.o. female.    Chief Complaint: Low-back Pain    This is a 54-year-old woman who has no primary care physician.  Denies any chronic major medical problems.  No cancer history.  No history of back problems.  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  Presents with 1 month history of spontaneously occurring right leg pain that radiates to the right lateral ankle.  No significant low back pain.  No bowel or bladder dysfunction fever chills sweats or unexpected weight loss.  Went to urgent care on 04/28/2024 and received a shot of Toradol and was released on a Medrol Dosepak naproxen and Flexeril.  Later she contacted urgent Care saying that she did not respond to that treatment and gabapentin was added.  Patient does not find gabapentin particularly useful either.  She presents to me with ongoing complaints of right-sided leg pain as described above.  Pain level is 9/10 and interferes with quality of life in terms of activities of daily living recreation and social activities.  I have no imaging to review          History reviewed. No pertinent past medical history.  Social History     Socioeconomic History    Marital status:    Tobacco Use    Smoking status: Never    Smokeless tobacco: Never   Substance and Sexual Activity    Alcohol use: Yes    Drug use: Never    Sexual activity: Not Currently     Past Surgical History:   Procedure Laterality Date    CHOLECYSTECTOMY       No family history on file.  Vitals:    05/16/24 1112   Weight: 117 kg (257 lb 15 oz)   Height: 5' 1.42" (1.56 m)       Review of Systems   Constitutional:  Negative for chills, diaphoresis, fatigue, fever and unexpected weight change.   HENT:  Negative for trouble swallowing.    Eyes:  Negative for visual disturbance.   Respiratory:  Negative for shortness of breath.    Cardiovascular:  Negative for chest pain.   Gastrointestinal:  Negative for abdominal pain, constipation, nausea " and vomiting.   Genitourinary:  Negative for difficulty urinating.   Musculoskeletal:  Negative for arthralgias, back pain, gait problem, joint swelling, myalgias, neck pain and neck stiffness.   Neurological:  Negative for dizziness, speech difficulty, weakness, light-headedness, numbness and headaches.          Objective:      Physical Exam  Neurological:      Mental Status: She is alert and oriented to person, place, and time.      Comments: She is awake and in no acute distress  Mild tenderness to palpation right lumbar paraspinous musculature at the lumbosacral junction with no palpable masses  Forward flexion of the lumbar spine is to about 60° before complaint pain at the lumbosacral junction.  Extension at 10° causes mild pain on the right at the lumbosacral junction  She can toe walk normally.  Heel walking was deferred due to poor balance  Reflexes- +1-+2 reflexes at the following:   C5-Biceps   C6-Brachioradialis   C7-Triceps   L3/4-Patellar   S1-Achilles   Hilary sign is negative bilaterally  Strength testing- 5/5 strength in the following muscle groups:  C5-Elbow flexion  C6-Wrist extension  C7-Elbow extension  C8-Finger flexion  T1-Finger abduction  L2-Hip flexion  L3-Knee extension  L4-Ankle dorsiflexion  L5-Great toe extension  S1-Ankle plantar flexion    Straight leg raise on the right is positive for reproduction of right leg pain.  Straight leg raise on the left is negative                Assessment:       1. Right lumbar radiculitis           Plan:     She has a nonfocal neurological examination and no historical red flags.  She does however have a positive straight leg raise on the right.  She has not responded to reasonable conservative treatment.  I recommend an MRI of the lumbar spine.  She may be a candidate for epidural steroid injections.  In the meantime continue gabapentin and add tramadol as needed for pain.  Cautioned against long-term use of this medication.  We gave her a note to  be on light duty until further notice.  Follow-up with me after the scan      Right lumbar radiculitis  -     MRI Lumbar Spine Without Contrast; Future; Expected date: 05/16/2024  -     traMADoL (ULTRAM) 50 mg tablet; Take 1 tablet (50 mg total) by mouth every 6 (six) hours as needed for Pain.  Dispense: 28 tablet; Refill: 0

## 2024-05-24 ENCOUNTER — HOSPITAL ENCOUNTER (OUTPATIENT)
Dept: RADIOLOGY | Facility: HOSPITAL | Age: 54
Discharge: HOME | End: 2024-05-24
Attending: PHYSICAL MEDICINE & REHABILITATION
Payer: COMMERCIAL

## 2024-05-24 DIAGNOSIS — M54.16 RIGHT LUMBAR RADICULITIS: ICD-10-CM

## 2024-05-24 PROCEDURE — 72148 MRI LUMBAR SPINE W/O DYE: CPT | Mod: TC

## 2024-05-24 PROCEDURE — 72148 MRI LUMBAR SPINE W/O DYE: CPT | Mod: 26,,, | Performed by: RADIOLOGY

## 2024-05-29 ENCOUNTER — OFFICE VISIT (OUTPATIENT)
Dept: URGENT CARE | Facility: CLINIC | Age: 54
End: 2024-05-29
Payer: COMMERCIAL

## 2024-05-29 ENCOUNTER — TELEPHONE (OUTPATIENT)
Dept: PAIN MEDICINE | Facility: CLINIC | Age: 54
End: 2024-05-29
Payer: COMMERCIAL

## 2024-05-29 VITALS
TEMPERATURE: 97 F | DIASTOLIC BLOOD PRESSURE: 54 MMHG | HEIGHT: 62 IN | HEART RATE: 75 BPM | BODY MASS INDEX: 46.14 KG/M2 | RESPIRATION RATE: 14 BRPM | WEIGHT: 250.75 LBS | OXYGEN SATURATION: 99 % | SYSTOLIC BLOOD PRESSURE: 103 MMHG

## 2024-05-29 DIAGNOSIS — M54.31 RIGHT SIDED SCIATICA: Primary | ICD-10-CM

## 2024-05-29 DIAGNOSIS — F41.9 ANXIETY: ICD-10-CM

## 2024-05-29 DIAGNOSIS — M54.16 LUMBAR RADICULOPATHY: Primary | ICD-10-CM

## 2024-05-29 PROCEDURE — 99214 OFFICE O/P EST MOD 30 MIN: CPT | Mod: S$GLB,,,

## 2024-05-29 RX ORDER — CYCLOBENZAPRINE HCL 10 MG
10 TABLET ORAL 3 TIMES DAILY PRN
Qty: 15 TABLET | Refills: 0 | Status: SHIPPED | OUTPATIENT
Start: 2024-05-29

## 2024-05-29 RX ORDER — HYDROXYZINE HYDROCHLORIDE 25 MG/1
25 TABLET, FILM COATED ORAL NIGHTLY
Qty: 10 TABLET | Refills: 0 | Status: SHIPPED | OUTPATIENT
Start: 2024-05-29

## 2024-05-29 NOTE — PATIENT INSTRUCTIONS
Apply RICE technique:   REST, ICE, COMPRESSION, ELEVATION.  Use ice to area for 20 minutes 3-4 times a day for comfort. Can also rotate with use of heat for 20 minutes.   No heavy lifting.  Back stretches as needed for pain relief.  Take Tylenol and/or Ibuprofen (if applicable) as needed for pain.  Apply analgesic rubs or patches such as biofreeze or Tiger Balm.  Go to ER if you have fever of 103 or higher, bowel/bladder incontinence, numbness, tingling, weakness to lower extremities, or if your symptoms worsen in any way.    Follow up with Orthopedics or primary care within 2 weeks if symptoms do not improve.

## 2024-05-29 NOTE — TELEPHONE ENCOUNTER
----- Message from Gabriel Redd MD sent at 5/29/2024  3:06 PM CDT -----  Please schedule for interlaminar injection L5-S1

## 2024-05-29 NOTE — PROGRESS NOTES
"Subjective:      Patient ID: Selwyn Crowder is a 54 y.o. female.    Vitals:  height is 5' 2.01" (1.575 m) and weight is 113.7 kg (250 lb 12.4 oz). Her tympanic temperature is 97 °F (36.1 °C). Her blood pressure is 103/54 (abnormal) and her pulse is 75. Her respiration is 14 and oxygen saturation is 99%.     Chief Complaint: Leg Pain    Selwyn Crowder is a 54 y.o. female who presents for R leg pain which onset 2 months ago. She notes pain worsened 3 weeks ago. Patient states she fell at work twice. Hx of sciatica. Pain is 10/10 in severity. No injury or trauma. Pain radiates down RLE. Associated sxs include numbness. Patient denies any fever, chills, SOB, CP, n/v/d, bladder/bowel incontinence, weakness, or numbness/tingling. Patient was advised to take Gabapentin. She was seen by Spine Specialist and given Tramadol. However, patient states she is still in excruciating pain. OTC - tylenol extra strength.    Leg Pain   The incident occurred more than 1 week ago. The incident occurred at work. The injury mechanism was a direct blow. The pain is present in the right hip, right leg, right foot and right ankle. The quality of the pain is described as shooting and stabbing. The pain is at a severity of 10/10. The pain is severe. The pain has been Constant since onset. Associated symptoms include a loss of sensation, numbness and tingling. Pertinent negatives include no inability to bear weight, loss of motion or muscle weakness. She reports no foreign bodies present. The symptoms are aggravated by movement. Treatments tried: gabapentin, tramadol, muscle relaxers. The treatment provided no relief.       Constitution: Negative for chills and fever.   Musculoskeletal:  Positive for pain (R leg) and back pain. Negative for trauma, joint pain, joint swelling, abnormal ROM of joint, muscle cramps and muscle ache.   Neurological:  Positive for numbness and tingling.      Objective:     Physical Exam   Constitutional: She " is oriented to person, place, and time. She appears well-developed. She is cooperative. No distress.   HENT:   Head: Normocephalic and atraumatic.   Nose: Nose normal.   Mouth/Throat: Oropharynx is clear and moist and mucous membranes are normal.   Eyes: Conjunctivae and lids are normal.   Neck: Trachea normal and phonation normal. Neck supple.   Cardiovascular: Normal rate, regular rhythm, normal heart sounds and normal pulses.   Pulmonary/Chest: Effort normal and breath sounds normal.   Abdominal: Normal appearance and bowel sounds are normal. She exhibits no mass. Soft.   Musculoskeletal:         General: No deformity.      Lumbar back: She exhibits tenderness. She exhibits no bony tenderness, no swelling and no deformity.        Back:       Comments: R gluteal and R hip tenderness to palpation. No lumbar paraspinal tenderness. No lumbar midline tenderness. No deformity or step offs. Limited ROM due to pain. Decreased sensation to L ankle. NVI. (+) R SLR.    Neurological: She is alert and oriented to person, place, and time. She has normal strength and normal reflexes. No sensory deficit.   Skin: Skin is warm, dry, intact and not diaphoretic.   Psychiatric: Her speech is normal and behavior is normal. Judgment and thought content normal. Her mood appears anxious.   Nursing note and vitals reviewed.      Assessment:     1. Right sided sciatica    2. Anxiety        Plan:       Right sided sciatica  -     cyclobenzaprine (FLEXERIL) 10 MG tablet; Take 1 tablet (10 mg total) by mouth 3 (three) times daily as needed for Muscle spasms.  Dispense: 15 tablet; Refill: 0    Anxiety  -     hydrOXYzine HCL (ATARAX) 25 MG tablet; Take 1 tablet (25 mg total) by mouth nightly.  Dispense: 10 tablet; Refill: 0        Chart reviewed.  Afebrile. VSS.  Patient has no red flag symptoms such as saddle anesthesia, bowel/bladder incontinence, no personal hx of cancer, no unexplained weight loss, fevers or chills.  This is a chronic  problem.  MRI reviewed. There is multilevel degenerative changes of the lumbar spine contribute to mild L2-L3 through L4-L5 spinal canal stenosis with mild-to-moderate neural foraminal stenosis.  Advised patient to continue Gabapentin as prescribed. Patient can also take Tylenol as needed for back pain.    Meds: Flexeril sent to prescribed pharmacy to take as needed for muscle pain and spasms.   Hydroxyzine sent for anxiety.  Patient has follow up with Spine Specialist on 6/6. She is scheduled for L5-S1 interlaminar injections.  Strict ER precautions given for back pain including numbness, tingling, saddle anesthesia, bowel or bladder incontinence or worsening back pain that does not resolve with treatment regimen.   Patient agreed with treatment plan and has no further questions at this time.

## 2024-05-29 NOTE — LETTER
May 29, 2024      [facility] [address] [place] [address]  Phone: [phone]  Fax: [phone]       Patient: Selwyn Crowder   YOB: 1970  Date of Visit: 05/29/2024    To Whom It May Concern:    Dawit Crowder  was at [facility] on 05/29/2024. The patient may return to work/school on 06/03/2024 with no restrictions. If you have any questions or concerns, or if I can be of further assistance, please do not hesitate to contact me.    Sincerely,    Orquidea Blue PA-C

## 2024-05-31 ENCOUNTER — HOSPITAL ENCOUNTER (EMERGENCY)
Facility: HOSPITAL | Age: 54
Discharge: HOME | End: 2024-05-31
Attending: EMERGENCY MEDICINE
Payer: COMMERCIAL

## 2024-05-31 ENCOUNTER — NURSE TRIAGE (OUTPATIENT)
Dept: ADMINISTRATIVE | Facility: CLINIC | Age: 54
End: 2024-05-31
Payer: COMMERCIAL

## 2024-05-31 VITALS
DIASTOLIC BLOOD PRESSURE: 82 MMHG | WEIGHT: 252.19 LBS | OXYGEN SATURATION: 98 % | HEART RATE: 76 BPM | TEMPERATURE: 98 F | SYSTOLIC BLOOD PRESSURE: 146 MMHG | BODY MASS INDEX: 46.12 KG/M2 | RESPIRATION RATE: 18 BRPM

## 2024-05-31 DIAGNOSIS — M79.672 BILATERAL FOOT PAIN: Primary | ICD-10-CM

## 2024-05-31 DIAGNOSIS — M79.671 BILATERAL FOOT PAIN: Primary | ICD-10-CM

## 2024-05-31 LAB — POCT GLUCOSE: 91 MG/DL (ref 70–110)

## 2024-05-31 PROCEDURE — 82962 GLUCOSE BLOOD TEST: CPT | Mod: ER

## 2024-05-31 PROCEDURE — 99283 EMERGENCY DEPT VISIT LOW MDM: CPT | Mod: ER

## 2024-05-31 RX ORDER — TRAMADOL HYDROCHLORIDE 50 MG/1
50 TABLET ORAL NIGHTLY PRN
Qty: 8 TABLET | Refills: 0 | Status: SHIPPED | OUTPATIENT
Start: 2024-05-31

## 2024-05-31 NOTE — Clinical Note
"Selwyn Correa" Vel was seen and treated in our emergency department on 5/31/2024.  She may return to work on 06/05/2024.       If you have any questions or concerns, please don't hesitate to call.      Staci Swift, MICHAELA"

## 2024-05-31 NOTE — TELEPHONE ENCOUNTER
UC, pain is getting worse. I am an employee and I cannot work, I am in terrible pain. UC did not do anything. Patient frustrated with care and that she cannot see provider until later this month. Triage done-see in office today. Advised UC or ED. States she has been to both and they do not do anything. Adivsed I could send message to provider. States she spoke to them yesterday and they cannot see her sooner. Number given to Patient Relations. Verb understanding.   Reason for Disposition   Weakness (i.e., loss of strength) of new-onset in foot or toes (Exceptions: Not truly weak, foot feels weak because of pain; weakness present > 2 weeks.)    Additional Information   Negative: Entire foot is cool or blue in comparison to other foot   Negative: Purple or black skin on foot or toe   Negative: Red area or streak and fever   Negative: Swollen foot and fever   Negative: Patient sounds very sick or weak to the triager   Negative: SEVERE pain (e.g., excruciating, unable to do any normal activities)   Negative: Looks like a boil, infected sore, deep ulcer, or other infected rash (spreading redness, pus)   Negative: Swollen foot  (Exceptions: Localized bump from bunions, calluses, insect bite, sting.)    Protocols used: Foot Pain-A-[place]

## 2024-06-01 NOTE — ED PROVIDER NOTES
History      Chief Complaint   Patient presents with    Leg Pain     Pt c/o B feet and ankle pain with numbness and tingling. Pt reports that she has had this complaint for over a month, but that it has gotten worse over the last week. Reported that she has a pinched nerve in her back.         Review of patient's allergies indicates:  No Known Allergies     HPI   HPI    5/31/2024, 7:14 PM   History obtained from the patient      History of Present Illness: Selwyn Crowder is a 54 y.o. female patient who presents to the Emergency Department for low back pain and bilateral foot pain for over a month.   She says she has seen specialists including Podiatry and she had a MRI of her spine.  Her main concern is returning to work and pain being worse at night when she is trying to sleep.  Denies bladder/bowel dysfunction, fever, saddle anesthesia, or focal weakness.   No further complaints or concerns at this time.           PCP: No, Primary Doctor       Past Medical History:  No past medical history on file.      Past Surgical History:  Past Surgical History:   Procedure Laterality Date    CHOLECYSTECTOMY             Family History:  No family history on file.        Social History:  Social History     Tobacco Use    Smoking status: Never    Smokeless tobacco: Never   Substance and Sexual Activity    Alcohol use: Yes    Drug use: Never    Sexual activity: Not Currently       ROS   Review of Systems   Constitutional: Negative for chills and fever.   Musculoskeletal: Positive for back pain.   Neurological: Negative for weakness and numbness.   Review of Systems    Physical Exam      Initial Vitals [05/31/24 1836]   BP Pulse Resp Temp SpO2   (!) 217/85 82 18 98 °F (36.7 °C) 100 %      MAP       --         Physical Exam  Vital signs and nursing notes reviewed.  Constitutional: Patient is in NAD. Awake and alert. Well-developed and well-nourished.  Head: Atraumatic. Normocephalic.  Eyes: PERRL. EOM intact. Conjunctivae  nl.  ENT: Mucous membranes are moist.   Neck: Supple.   Cardiovascular: Regular rate and rhythm. No murmurs, rubs, or gallops.   Pulmonary/Chest: No respiratory distress. Clear to auscultation bilaterally.   Abdominal: Soft. Non-distended. No TTP. No rebound, guarding, or rigidity.   Genitourinary: No CVA tenderness  Musculoskeletal: Moves all extremities. No edema.   Non tender c/t spine.  Lumbar spine with mild bilateral paraspinous ttp, no midline ttp or step. Bilateral feet with 2+ dorsalis pedis pulses  Skin: Warm and dry.  Neurological: Awake and alert. No acute focal neurological deficits are appreciated.  5/5 x 4 strength.  Strong and equal foot plantar and dorsiflexion.  Psychiatric: Normal affect. Good eye contact. Appropriate in content.      ED Course      Procedures  ED Vital Signs:  Vitals:    05/31/24 1836 05/31/24 1838   BP: (!) 217/85 (!) 150/73   Pulse: 82 83   Resp: 18 18   Temp: 98 °F (36.7 °C)    TempSrc: Oral    SpO2: 100%    Weight: 114.4 kg (252 lb 3.3 oz)          Results for orders placed or performed during the hospital encounter of 05/31/24   POCT glucose   Result Value Ref Range    POCT Glucose 91 70 - 110 mg/dL             Imaging Results:  Imaging Results    None            The Emergency Provider reviewed the vital signs and test results, which are outlined above.    ED Discussion         Medication(s) given in the ER:  Medications - No data to display         Follow-up Information       [place] - [facility] In 2 days.    Specialty: Internal Medicine  Contact information:  [address] [address] [address] [phone]  Additional information:  Please park in surface lot and check in at main registration.                               New Prescriptions    TRAMADOL (ULTRAM) 50 MG TABLET    Take 1 tablet (50 mg total) by mouth nightly as needed for Pain.          Medical Decision Making        All findings were reviewed with the patient/family in detail.   All  remaining questions and concerns were addressed at that time.  Patient/family has been counseled regarding the need for follow-up as well as the indication to return to the emergency room should new or worrisome developments occur.          MDM     Amount and/or Complexity of Data Reviewed  Clinical lab tests: ordered and reviewed                   Clinical Impression:        ICD-10-CM ICD-9-CM   1. Bilateral foot pain  M79.671 729.5    M79.672               Staci Swift, PAAlfredoC  05/31/24 1916

## 2024-06-03 ENCOUNTER — TELEPHONE (OUTPATIENT)
Dept: SPINE | Facility: CLINIC | Age: 54
End: 2024-06-03
Payer: COMMERCIAL

## 2024-06-03 NOTE — TELEPHONE ENCOUNTER
Spoke to nurse reviewer with [facility]. Auth number [id number]. Pt called and advised procedure date changed to 6/6/24. Pt advised to stop all blood thinner, NSAIDs and Asprin products immediately. Pt verbalized understanding.

## 2024-06-03 NOTE — TELEPHONE ENCOUNTER
Spoke to pt. She is in a great amount of pain. States she was seen in ED and given tramadol and it is not giving her relief. She is complaining of increased numbness and pain in her feet. She is concerned because she is unable to work at this time and is afraid she will be fired. She reports being unable to walk or move without pain. Reached out to Dr. Miramontes office to see if NAZIA can be done sooner. She states she has been unable to do HEP that was given to her at her office visit 5/16 due to increased pain.

## 2024-06-06 ENCOUNTER — HOSPITAL ENCOUNTER (OUTPATIENT)
Facility: HOSPITAL | Age: 54
Discharge: HOME | End: 2024-06-06
Attending: ANESTHESIOLOGY | Admitting: ANESTHESIOLOGY
Payer: COMMERCIAL

## 2024-06-06 VITALS
DIASTOLIC BLOOD PRESSURE: 56 MMHG | HEART RATE: 55 BPM | HEIGHT: 62 IN | WEIGHT: 252.19 LBS | SYSTOLIC BLOOD PRESSURE: 129 MMHG | OXYGEN SATURATION: 97 % | RESPIRATION RATE: 18 BRPM | TEMPERATURE: 98 F | BODY MASS INDEX: 46.41 KG/M2

## 2024-06-06 DIAGNOSIS — M54.16 LUMBAR RADICULITIS: ICD-10-CM

## 2024-06-06 PROCEDURE — 25000003 PHARM REV CODE 250: Performed by: ANESTHESIOLOGY

## 2024-06-06 PROCEDURE — 63600175 PHARM REV CODE 636 W HCPCS: Performed by: ANESTHESIOLOGY

## 2024-06-06 PROCEDURE — 62323 NJX INTERLAMINAR LMBR/SAC: CPT | Mod: ,,, | Performed by: ANESTHESIOLOGY

## 2024-06-06 PROCEDURE — A4216 STERILE WATER/SALINE, 10 ML: HCPCS | Performed by: ANESTHESIOLOGY

## 2024-06-06 PROCEDURE — 36000704 HC OR TIME LEV I 1ST 15 MIN: Performed by: ANESTHESIOLOGY

## 2024-06-06 PROCEDURE — 25500020 PHARM REV CODE 255: Performed by: ANESTHESIOLOGY

## 2024-06-06 RX ORDER — SODIUM CHLORIDE, SODIUM LACTATE, POTASSIUM CHLORIDE, CALCIUM CHLORIDE 600; 310; 30; 20 MG/100ML; MG/100ML; MG/100ML; MG/100ML
INJECTION, SOLUTION INTRAVENOUS CONTINUOUS
Status: DISCONTINUED | OUTPATIENT
Start: 2024-06-06 | End: 2024-06-06

## 2024-06-06 RX ORDER — DEXAMETHASONE SODIUM PHOSPHATE 10 MG/ML
INJECTION INTRAMUSCULAR; INTRAVENOUS
Status: DISCONTINUED | OUTPATIENT
Start: 2024-06-06 | End: 2024-06-06 | Stop reason: HOSPADM

## 2024-06-06 RX ORDER — ALPRAZOLAM 1 MG/1
1 TABLET, ORALLY DISINTEGRATING ORAL ONCE AS NEEDED
Status: COMPLETED | OUTPATIENT
Start: 2024-06-06 | End: 2024-06-06

## 2024-06-06 RX ORDER — LIDOCAINE HYDROCHLORIDE 10 MG/ML
INJECTION, SOLUTION EPIDURAL; INFILTRATION; INTRACAUDAL; PERINEURAL
Status: DISCONTINUED | OUTPATIENT
Start: 2024-06-06 | End: 2024-06-06 | Stop reason: HOSPADM

## 2024-06-06 RX ORDER — SODIUM CHLORIDE 9 MG/ML
INJECTION, SOLUTION INTRAMUSCULAR; INTRAVENOUS; SUBCUTANEOUS
Status: DISCONTINUED | OUTPATIENT
Start: 2024-06-06 | End: 2024-06-06 | Stop reason: HOSPADM

## 2024-06-06 RX ORDER — LIDOCAINE HYDROCHLORIDE 10 MG/ML
1 INJECTION, SOLUTION EPIDURAL; INFILTRATION; INTRACAUDAL; PERINEURAL ONCE
Status: DISCONTINUED | OUTPATIENT
Start: 2024-06-06 | End: 2024-06-06

## 2024-06-06 RX ADMIN — ALPRAZOLAM 1 MG: 1 TABLET, ORALLY DISINTEGRATING ORAL at 08:06

## 2024-06-06 NOTE — DISCHARGE SUMMARY
[facility] - Periop Services  Discharge Note  Short Stay    Procedure(s) (LRB):  Injection-steroid-epidural-lumbar l5-s1 (N/A)      OUTCOME: Patient tolerated treatment/procedure well without complication and is now ready for discharge.    DISPOSITION: Home or Self Care    FINAL DIAGNOSIS:  <principal problem not specified>    FOLLOWUP: In clinic    DISCHARGE INSTRUCTIONS:    Discharge Procedure Orders   Notify your health care provider if you experience any of the following:  temperature >100.4     Notify your health care provider if you experience any of the following:  severe uncontrolled pain     Notify your health care provider if you experience any of the following:  redness, tenderness, or signs of infection (pain, swelling, redness, odor or green/yellow discharge around incision site)     Activity as tolerated        TIME SPENT ON DISCHARGE: 30 minutes

## 2024-06-06 NOTE — OP NOTE
PROCEDURE DATE: 6/6/2024    Procedure:   Interlaminar epidural steroid injection at L5-Ss1 under fluoroscopic guidance.    Diagnosis: lUMBAR radiculitis  pOSTOP DIAGNOSIS: sAME    Physician: Kenn Miramontes M.D.    Medications injected:10 mg dexamethasone with 4 ml of preservative free NaCl    Local anesthetic injected:    Lidocaine 1% 1 ml total    Sedation Medications: None    Estimated blood loss:  None    Complications:  None    Technique:  Time-out taken to identify patient and procedure prior to starting the procedure.  With the patient laying in a prone position, the area was prepped and draped in the usual sterile fashion using ChloraPrep and a fenestrated drape.  After determining the target level with an AP fluoroscopic view, local anesthetic was given using a 25-gauge 1.5 inch needle by raising a wheal and then infiltrating toward the interlaminar entry space.  A 4.5inch 20-gauge Touhy needle was introduced under AP fluoroscopic guidance to the interlaminar space of L5-S1. Once the trajectory was established, the needle was visualized in the lateral view and advanced using loss of resistance technique. Once in the desired position, 1ml contrast was injected to confirm placement and there was no vascular uptake nor intrathecal spread.  The medication was then injected slowly. The patient tolerated the procedure well.      The patient was monitored after the procedure.   They were given post-procedure and discharge instructions to follow at home.  The patient was discharged in a stable condition.

## 2024-06-10 NOTE — PROGRESS NOTES
Pt states she is still having numbness in foot (baseline for about a month), told pt that it may take up to 2 weeks to get better, but in the meantime, she needed a doctor's note for light duty at work.  Told pt to reach out to dr blanton for this.

## 2024-06-14 RX ORDER — DICLOFENAC SODIUM 50 MG/1
50 TABLET, DELAYED RELEASE ORAL 3 TIMES DAILY PRN
Qty: 60 TABLET | Refills: 0 | Status: SHIPPED | OUTPATIENT
Start: 2024-06-14

## 2024-06-21 ENCOUNTER — OFFICE VISIT (OUTPATIENT)
Dept: SPINE | Facility: CLINIC | Age: 54
End: 2024-06-21
Payer: COMMERCIAL

## 2024-06-21 ENCOUNTER — TELEPHONE (OUTPATIENT)
Dept: PAIN MEDICINE | Facility: CLINIC | Age: 54
End: 2024-06-21
Payer: COMMERCIAL

## 2024-06-21 VITALS — HEIGHT: 62 IN | BODY MASS INDEX: 46.41 KG/M2 | WEIGHT: 252.19 LBS

## 2024-06-21 DIAGNOSIS — M54.16 LUMBAR RADICULOPATHY: Primary | ICD-10-CM

## 2024-06-21 DIAGNOSIS — M54.31 RIGHT SIDED SCIATICA: ICD-10-CM

## 2024-06-21 DIAGNOSIS — M54.16 RIGHT LUMBAR RADICULITIS: Primary | ICD-10-CM

## 2024-06-21 PROCEDURE — 99999 PR PBB SHADOW E&M-EST. PATIENT-LVL III: CPT | Mod: PBBFAC,,, | Performed by: PHYSICAL MEDICINE & REHABILITATION

## 2024-06-21 RX ORDER — CYCLOBENZAPRINE HCL 10 MG
10 TABLET ORAL 3 TIMES DAILY PRN
Qty: 60 TABLET | Refills: 0 | Status: SHIPPED | OUTPATIENT
Start: 2024-06-21

## 2024-06-21 NOTE — H&P (VIEW-ONLY)
SUBJECTIVE:    Patient ID: Selwyn Crowder is a 54 y.o. female.    Chief Complaint: Follow-up    She is here for follow-up status post interlaminar injection L5-S1 on 06/06/2024 with Dr. Miramontes.  No improvement whatsoever from that procedure.  Ongoing complaints of pain radiating down the right leg into the dorsal and lateral aspect of the right foot.  She is complaining of numbness of both feet for about 2 weeks.  Otherwise she has no new or progressive problems.  Her pain level is 8/10 and interferes with quality of life in terms of activities of daily living recreation and social activities.        We took this opportunity to formally review her lumbar MRI done 05/24/2024.  The study is summarized below:      FINDINGS:  Alignment: Within normal limits.     Vertebrae: Lumbar vertebral body heights are maintained.  Minor endplate degenerative changes throughout the lumbar spine.  No significant endplate edema.  No evidence of an acute fracture.  Marrow signal is otherwise within normal limits.     Discs: L3-L4 and L4-L5 disc desiccation.  Disc heights are maintained.     Cord: Normal.  Conus terminates at L1.     Degenerative findings:     T12-L1: No significant posterior disc bulge or spinal canal stenosis.  Facet arthropathy contributes to mild right-sided neural foraminal stenosis.     L1-L2: No significant posterior disc bulge or spinal canal stenosis.  Bilateral facet arthropathy contributes to mild bilateral neural foraminal stenosis.     L2-L3: Mild broad-based posterior disc bulge, facet arthropathy and ligamentum flavum hypertrophy contribute to mild spinal canal stenosis, moderate right and mild-to-moderate left-sided neural foraminal stenosis.     L3-L4: Mild broad-based posterior disc bulge.  Right greater than left facet arthropathy and ligamentum flavum hypertrophy contributes to mild spinal canal stenosis and right lateral recess stenosis.  There is mild-to-moderate bilateral neural foraminal  "stenosis.     L4-L5: Mild broad-based posterior disc bulge is asymmetric to the right.  Bilateral facet arthropathy and ligamentum flavum hypertrophy contribute to mild spinal canal stenosis, moderate right and mild left-sided neural foraminal stenosis.     L5-S1: Mild broad-based posterior disc bulge.  Mild bilateral facet arthropathy contributes to mild bilateral neural foraminal stenosis.  No significant spinal canal stenosis.  Moderate right anterolateral disc osteophyte complex closely approximates the exited right L5 nerve root.     Paraspinal muscles & soft tissues: Within normal limits.     Impression:     1. Multilevel degenerative changes of the lumbar spine contribute to mild L2-L3 through L4-L5 spinal canal stenosis with mild-to-moderate neural foraminal stenosis as discussed in the body of the report.            History reviewed. No pertinent past medical history.  Social History     Socioeconomic History    Marital status:    Tobacco Use    Smoking status: Never    Smokeless tobacco: Never   Substance and Sexual Activity    Alcohol use: Yes    Drug use: Never    Sexual activity: Not Currently     Past Surgical History:   Procedure Laterality Date    CHOLECYSTECTOMY      EPIDURAL STEROID INJECTION INTO LUMBAR SPINE N/A 6/6/2024    Procedure: Injection-steroid-epidural-lumbar;  Surgeon: Kenn Miramontes MD;  Location: [facility] OR;  Service: Anesthesiology;  Laterality: N/A;     No family history on file.  Vitals:    06/21/24 1142   Weight: 114.4 kg (252 lb 3.3 oz)   Height: 5' 2" (1.575 m)       Review of Systems   Constitutional:  Negative for chills, diaphoresis, fatigue, fever and unexpected weight change.   HENT:  Negative for trouble swallowing.    Eyes:  Negative for visual disturbance.   Respiratory:  Negative for shortness of breath.    Cardiovascular:  Negative for chest pain.   Gastrointestinal:  Negative for abdominal pain, constipation, nausea and vomiting.   Genitourinary:  Negative for " difficulty urinating.   Musculoskeletal:  Negative for arthralgias, back pain, gait problem, joint swelling, myalgias, neck pain and neck stiffness.   Neurological:  Negative for dizziness, speech difficulty, weakness, light-headedness, numbness and headaches.          Objective:      Physical Exam  Neurological:      Mental Status: She is alert and oriented to person, place, and time.             Assessment:       1. Right lumbar radiculitis           Plan:     No improvement status post interlaminar injection L5-S1.  We will send her for transforaminal injections on the right at L5-S1 and S1.  If she fails that then I would have no additional recommendations and she can consult with pain management or Neurosurgery at her discretion.  Consider EMG and nerve conduction studies if the numbness persists although her body habitus may be prohibitive.  Too soon to do them now.  Follow up with me after the procedure.  Refill Flexeril      Right lumbar radiculitis

## 2024-06-21 NOTE — TELEPHONE ENCOUNTER
----- Message from Gabriel Redd MD sent at 6/21/2024 12:00 PM CDT -----  Please schedule for transforaminal injection right L5-S1 and S1

## 2024-06-21 NOTE — PROGRESS NOTES
SUBJECTIVE:    Patient ID: Selwyn Crowder is a 54 y.o. female.    Chief Complaint: Follow-up    She is here for follow-up status post interlaminar injection L5-S1 on 06/06/2024 with Dr. Miramontes.  No improvement whatsoever from that procedure.  Ongoing complaints of pain radiating down the right leg into the dorsal and lateral aspect of the right foot.  She is complaining of numbness of both feet for about 2 weeks.  Otherwise she has no new or progressive problems.  Her pain level is 8/10 and interferes with quality of life in terms of activities of daily living recreation and social activities.        We took this opportunity to formally review her lumbar MRI done 05/24/2024.  The study is summarized below:      FINDINGS:  Alignment: Within normal limits.     Vertebrae: Lumbar vertebral body heights are maintained.  Minor endplate degenerative changes throughout the lumbar spine.  No significant endplate edema.  No evidence of an acute fracture.  Marrow signal is otherwise within normal limits.     Discs: L3-L4 and L4-L5 disc desiccation.  Disc heights are maintained.     Cord: Normal.  Conus terminates at L1.     Degenerative findings:     T12-L1: No significant posterior disc bulge or spinal canal stenosis.  Facet arthropathy contributes to mild right-sided neural foraminal stenosis.     L1-L2: No significant posterior disc bulge or spinal canal stenosis.  Bilateral facet arthropathy contributes to mild bilateral neural foraminal stenosis.     L2-L3: Mild broad-based posterior disc bulge, facet arthropathy and ligamentum flavum hypertrophy contribute to mild spinal canal stenosis, moderate right and mild-to-moderate left-sided neural foraminal stenosis.     L3-L4: Mild broad-based posterior disc bulge.  Right greater than left facet arthropathy and ligamentum flavum hypertrophy contributes to mild spinal canal stenosis and right lateral recess stenosis.  There is mild-to-moderate bilateral neural foraminal  "stenosis.     L4-L5: Mild broad-based posterior disc bulge is asymmetric to the right.  Bilateral facet arthropathy and ligamentum flavum hypertrophy contribute to mild spinal canal stenosis, moderate right and mild left-sided neural foraminal stenosis.     L5-S1: Mild broad-based posterior disc bulge.  Mild bilateral facet arthropathy contributes to mild bilateral neural foraminal stenosis.  No significant spinal canal stenosis.  Moderate right anterolateral disc osteophyte complex closely approximates the exited right L5 nerve root.     Paraspinal muscles & soft tissues: Within normal limits.     Impression:     1. Multilevel degenerative changes of the lumbar spine contribute to mild L2-L3 through L4-L5 spinal canal stenosis with mild-to-moderate neural foraminal stenosis as discussed in the body of the report.            History reviewed. No pertinent past medical history.  Social History     Socioeconomic History    Marital status:    Tobacco Use    Smoking status: Never    Smokeless tobacco: Never   Substance and Sexual Activity    Alcohol use: Yes    Drug use: Never    Sexual activity: Not Currently     Past Surgical History:   Procedure Laterality Date    CHOLECYSTECTOMY      EPIDURAL STEROID INJECTION INTO LUMBAR SPINE N/A 6/6/2024    Procedure: Injection-steroid-epidural-lumbar;  Surgeon: Kenn Miramontes MD;  Location: [facility] OR;  Service: Anesthesiology;  Laterality: N/A;     No family history on file.  Vitals:    06/21/24 1142   Weight: 114.4 kg (252 lb 3.3 oz)   Height: 5' 2" (1.575 m)       Review of Systems   Constitutional:  Negative for chills, diaphoresis, fatigue, fever and unexpected weight change.   HENT:  Negative for trouble swallowing.    Eyes:  Negative for visual disturbance.   Respiratory:  Negative for shortness of breath.    Cardiovascular:  Negative for chest pain.   Gastrointestinal:  Negative for abdominal pain, constipation, nausea and vomiting.   Genitourinary:  Negative for " difficulty urinating.   Musculoskeletal:  Negative for arthralgias, back pain, gait problem, joint swelling, myalgias, neck pain and neck stiffness.   Neurological:  Negative for dizziness, speech difficulty, weakness, light-headedness, numbness and headaches.          Objective:      Physical Exam  Neurological:      Mental Status: She is alert and oriented to person, place, and time.             Assessment:       1. Right lumbar radiculitis           Plan:     No improvement status post interlaminar injection L5-S1.  We will send her for transforaminal injections on the right at L5-S1 and S1.  If she fails that then I would have no additional recommendations and she can consult with pain management or Neurosurgery at her discretion.  Consider EMG and nerve conduction studies if the numbness persists although her body habitus may be prohibitive.  Too soon to do them now.  Follow up with me after the procedure.  Refill Flexeril      Right lumbar radiculitis

## 2024-06-24 ENCOUNTER — PATIENT MESSAGE (OUTPATIENT)
Dept: SPINE | Facility: CLINIC | Age: 54
End: 2024-06-24
Payer: COMMERCIAL

## 2024-06-25 ENCOUNTER — TELEPHONE (OUTPATIENT)
Dept: PAIN MEDICINE | Facility: CLINIC | Age: 54
End: 2024-06-25
Payer: COMMERCIAL

## 2024-06-25 NOTE — TELEPHONE ENCOUNTER
----- Message from Stefanie Hastings LPN sent at 6/25/2024 10:57 AM CDT -----  She would like for you to give her a call. She wants another date for her procedure

## 2024-07-10 ENCOUNTER — TELEPHONE (OUTPATIENT)
Dept: PAIN MEDICINE | Facility: CLINIC | Age: 54
End: 2024-07-10
Payer: COMMERCIAL

## 2024-07-10 NOTE — TELEPHONE ENCOUNTER
----- Message from Inga Georges sent at 7/10/2024 11:31 AM CDT -----  Regarding: call time  Contact: pt  Type: Needs Medical Advice  Who Called:  patient  Symptoms (please be specific):  call time needed put in my chart  How long has patient had these symptoms:    Pharmacy name and phone #:    Best Call Back Number: [phone]    Additional Information: call to advise

## 2024-07-10 NOTE — TELEPHONE ENCOUNTER
----- Message from Vanessa Lorenzo sent at 7/10/2024 10:16 AM CDT -----  Contact: pt  Type: Needs Medical Advice      Who Called: PT  Best Call Back Number:[phone]  Additional Information: Requesting a call back regarding  Pt needs office to call her about her appt for tomorrow for injection.  Pt seen the appt on [facility]  said no one called her with information.  Pt asking for the office to send a message via [facility] with time and everything.   Please Advise- Thank you

## 2024-07-11 ENCOUNTER — HOSPITAL ENCOUNTER (OUTPATIENT)
Facility: HOSPITAL | Age: 54
Discharge: HOME | End: 2024-07-11
Attending: ANESTHESIOLOGY | Admitting: ANESTHESIOLOGY
Payer: COMMERCIAL

## 2024-07-11 VITALS
TEMPERATURE: 98 F | RESPIRATION RATE: 18 BRPM | BODY MASS INDEX: 46.41 KG/M2 | WEIGHT: 252.19 LBS | DIASTOLIC BLOOD PRESSURE: 87 MMHG | HEIGHT: 62 IN | OXYGEN SATURATION: 97 % | HEART RATE: 83 BPM | SYSTOLIC BLOOD PRESSURE: 172 MMHG

## 2024-07-11 DIAGNOSIS — M54.16 LUMBAR RADICULITIS: ICD-10-CM

## 2024-07-11 PROCEDURE — 63600175 PHARM REV CODE 636 W HCPCS: Performed by: ANESTHESIOLOGY

## 2024-07-11 PROCEDURE — 64484 NJX AA&/STRD TFRM EPI L/S EA: CPT | Mod: RT | Performed by: ANESTHESIOLOGY

## 2024-07-11 PROCEDURE — 64484 NJX AA&/STRD TFRM EPI L/S EA: CPT | Mod: RT,,, | Performed by: ANESTHESIOLOGY

## 2024-07-11 PROCEDURE — 64483 NJX AA&/STRD TFRM EPI L/S 1: CPT | Mod: RT | Performed by: ANESTHESIOLOGY

## 2024-07-11 PROCEDURE — 25000003 PHARM REV CODE 250: Performed by: ANESTHESIOLOGY

## 2024-07-11 PROCEDURE — 25500020 PHARM REV CODE 255: Performed by: ANESTHESIOLOGY

## 2024-07-11 PROCEDURE — 64483 NJX AA&/STRD TFRM EPI L/S 1: CPT | Mod: RT,,, | Performed by: ANESTHESIOLOGY

## 2024-07-11 RX ORDER — LIDOCAINE HYDROCHLORIDE 10 MG/ML
INJECTION, SOLUTION EPIDURAL; INFILTRATION; INTRACAUDAL; PERINEURAL
Status: DISCONTINUED | OUTPATIENT
Start: 2024-07-11 | End: 2024-07-11 | Stop reason: HOSPADM

## 2024-07-11 RX ORDER — SODIUM CHLORIDE, SODIUM LACTATE, POTASSIUM CHLORIDE, CALCIUM CHLORIDE 600; 310; 30; 20 MG/100ML; MG/100ML; MG/100ML; MG/100ML
INJECTION, SOLUTION INTRAVENOUS CONTINUOUS
Status: DISCONTINUED | OUTPATIENT
Start: 2024-07-11 | End: 2024-07-11 | Stop reason: HOSPADM

## 2024-07-11 RX ORDER — BUPIVACAINE HYDROCHLORIDE 2.5 MG/ML
INJECTION, SOLUTION EPIDURAL; INFILTRATION; INTRACAUDAL
Status: DISCONTINUED | OUTPATIENT
Start: 2024-07-11 | End: 2024-07-11 | Stop reason: HOSPADM

## 2024-07-11 RX ORDER — DEXAMETHASONE SODIUM PHOSPHATE 10 MG/ML
INJECTION INTRAMUSCULAR; INTRAVENOUS
Status: DISCONTINUED | OUTPATIENT
Start: 2024-07-11 | End: 2024-07-11 | Stop reason: HOSPADM

## 2024-07-11 RX ORDER — LIDOCAINE HYDROCHLORIDE 10 MG/ML
1 INJECTION, SOLUTION EPIDURAL; INFILTRATION; INTRACAUDAL; PERINEURAL ONCE
Status: DISCONTINUED | OUTPATIENT
Start: 2024-07-11 | End: 2024-07-11 | Stop reason: HOSPADM

## 2024-07-11 RX ORDER — ALPRAZOLAM 1 MG/1
1 TABLET, ORALLY DISINTEGRATING ORAL
Status: COMPLETED | OUTPATIENT
Start: 2024-07-11 | End: 2024-07-11

## 2024-07-11 RX ADMIN — ALPRAZOLAM 1 MG: 1 TABLET, ORALLY DISINTEGRATING ORAL at 12:07

## 2024-07-11 NOTE — DISCHARGE SUMMARY
[facility] - Periop Services  Discharge Note  Short Stay    Procedure(s) (LRB):  Injection,steroid,epidural,transforaminal approach (Right)      OUTCOME: Patient tolerated treatment/procedure well without complication and is now ready for discharge.    DISPOSITION: Home or Self Care    FINAL DIAGNOSIS:  <principal problem not specified>    FOLLOWUP: In clinic    DISCHARGE INSTRUCTIONS:    Discharge Procedure Orders   Notify your health care provider if you experience any of the following:  temperature >100.4     Notify your health care provider if you experience any of the following:  severe uncontrolled pain     Notify your health care provider if you experience any of the following:  redness, tenderness, or signs of infection (pain, swelling, redness, odor or green/yellow discharge around incision site)     Activity as tolerated        TIME SPENT ON DISCHARGE: 30 minutes

## 2024-07-11 NOTE — OP NOTE
PROCEDURE DATE: 7/11/2024    PROCEDURE: Right L5-S1 and S1 transforaminal epidural steroid injection under fluoroscopy    DIAGNOSIS: Lumbar radiculitis    Post op diagnosis: Same    PHYSICIAN: Kenn Miramontes MD    MEDICATIONS INJECTED:  Dexamethasone 5mg (0.5ml) and 1.5ml 0.25% bupivicaine at each nerve root.     LOCAL ANESTHETIC INJECTED:  Lidocaine 1%. 2 ml per site.    SEDATION MEDICATIONS: RN IV sedation    ESTIMATED BLOOD LOSS:  None    COMPLICATIONS:  None    TECHNIQUE:   A time-out was taken to identify patient and procedure side prior to starting the procedure. The patient was placed in a prone position, prepped and draped in the usual sterile fashion using ChloraPrep and sterile towels.  The area to be injected was determined under fluoroscopic guidance in AP and oblique view.  Local anesthetic was given by raising a wheal and going down to the hub of a 25-gauge 1.5 inch needle.  In oblique view, a 5 inch 22-gauge bent-tip spinal needle was introduced towards 6 oclock position of the pedicle of each above named nerve root level.  The needle was walked medially then hinged into the neural foramen and position was confirmed in AP and lateral views.  1ml contrast dye was injected to confirm appropriate placement and that there was no vascular uptake.  After negative aspiration for blood or CSF, the medication was then injected. This was performed at the right L5-S1 and S1 level(s). The patient tolerated the procedure well.    The patient was monitored after the procedure.  Patient was given post procedure and discharge instructions to follow at home. The patient was discharged in a stable condition.

## 2024-07-11 NOTE — DISCHARGE INSTRUCTIONS
Pain injection instructions:     This procedure may take a couple weeks to relieve pain  You may get some pain relief from the local anesthetic initally.   Steroids can have side effects of flushed face or nervous feeling.    No driving for 24 hrs.   Activity as tolerated- gradually increase activities.  Dont lift over 10 lbs for 24 hrs   No heat at injection sites for 2 full days. No heating pads, hot tubs, saunas, or swimming in any body of water or pool for 2 full days.  Use ice pack for mild swelling and for comfort , apply for 20 minutes, remove for 20 minute intervals. No direct contact of ice itself  to skin.  May shower today.  Do not allow shower water to beat on injections site(s) for 2 full days. No tub baths for two full days.      Resume Aspirin, Plavix, or Coumadin the day after the procedure unless otherwise instructed.   If diabetic,monitor your glucose carefully as steroids can increase your glucose level    Seek immediate medical help for:     Severe increase in pain not relieved by medication or ice or any new pain in the region .    Prolonged (more than 24 hrs)or increasing weakness or numbness in the legs or arms. - it is normal to have weakness/numbness for up to 8 hrs.   Fever above 100 degrees F , Drainage,redness,active bleeding, or increased swelling at the injection site.  Headache that increases when sitting up, but decreases when lying down.  New shortness of breath, chest pain, or breathing problems.    After Surgery:  Always be aware that any surgery can cause these symptoms:    Pain- Medication can be prescribed for pain to decrease your pain but may not completely take your pain away. Over the Counter pain medicine my be enough and you can always use Ice and rest to help ease pain.    Bleeding- a little bleeding after a surgery is usually within normal.  If there is a lot of blood you need to notify your MD.  Emergency treatments of bleeding are cold application, elevation of the  bleeding site and compression.    Infection- Infection after surgery is NOT a normal occurrence.  Signs of infection are fever, swelling, hot to touch the incision.  If this occurs notify your MD immediately.    Nausea- this can be common after a surgery especially if you have had anesthesia medicine or are taking pain medicine.  Steroids have a side effect of nausea sometimes. Staying on clear liquids, bland foods, gingerale, or over the counter anti nausea medicines can help.  If you vomit more than once, notify your MD.  Anti Nausea medicines can be prescribed.Pain injection instructions:     This procedure may take a couple weeks to relieve pain  You may get some pain relief from the local anesthetic initally.   Steroids can have side effects of flushed face or nervous feeling.    No driving for 24 hrs.   Activity as tolerated- gradually increase activities.  Dont lift over 10 lbs for 24 hrs   No heat at injection sites for 2 full days. No heating pads, hot tubs, saunas, or swimming in any body of water or pool for 2 full days.  Use ice pack for mild swelling and for comfort , apply for 20 minutes, remove for 20 minute intervals. No direct contact of ice itself  to skin.  May shower today.  Do not allow shower water to beat on injections site(s) for 2 full days. No tub baths for two full days.      Resume Aspirin, Plavix, or Coumadin the day after the procedure unless otherwise instructed.   If diabetic,monitor your glucose carefully as steroids can increase your glucose level    Seek immediate medical help for:     Severe increase in pain not relieved by medication or ice or any new pain in the region .    Prolonged (more than 24 hrs)or increasing weakness or numbness in the legs or arms. - it is normal to have weakness/numbness for up to 8 hrs.   Fever above 100 degrees F , Drainage,redness,active bleeding, or increased swelling at the injection site.  Headache that increases when sitting up, but decreases  when lying down.  New shortness of breath, chest pain, or breathing problems.    After Surgery:  Always be aware that any surgery can cause these symptoms:    Pain- Medication can be prescribed for pain to decrease your pain but may not completely take your pain away. Over the Counter pain medicine my be enough and you can always use Ice and rest to help ease pain.    Bleeding- a little bleeding after a surgery is usually within normal.  If there is a lot of blood you need to notify your MD.  Emergency treatments of bleeding are cold application, elevation of the bleeding site and compression.    Infection- Infection after surgery is NOT a normal occurrence.  Signs of infection are fever, swelling, hot to touch the incision.  If this occurs notify your MD immediately.    Nausea- this can be common after a surgery especially if you have had anesthesia medicine or are taking pain medicine.  Steroids have a side effect of nausea sometimes. Staying on clear liquids, bland foods, gingerale, or over the counter anti nausea medicines can help.  If you vomit more than once, notify your MD.  Anti Nausea medicines can be prescribed.

## 2024-07-11 NOTE — PLAN OF CARE
Tolerated procedure well. Denies pain at this time. Transferred out of facility via wheelchair to friend without incident. Discharge instructions in hand upon departure.

## 2024-08-06 ENCOUNTER — OFFICE VISIT (OUTPATIENT)
Dept: SPINE | Facility: CLINIC | Age: 54
End: 2024-08-06
Payer: COMMERCIAL

## 2024-08-06 VITALS — BODY MASS INDEX: 46.41 KG/M2 | HEIGHT: 62 IN | WEIGHT: 252.19 LBS

## 2024-08-06 DIAGNOSIS — M54.16 RIGHT LUMBAR RADICULITIS: Primary | ICD-10-CM

## 2024-08-06 PROCEDURE — 99999 PR PBB SHADOW E&M-EST. PATIENT-LVL III: CPT | Mod: PBBFAC,,, | Performed by: PHYSICAL MEDICINE & REHABILITATION

## 2024-08-06 PROCEDURE — 99213 OFFICE O/P EST LOW 20 MIN: CPT | Mod: S$GLB,,, | Performed by: PHYSICAL MEDICINE & REHABILITATION

## 2024-08-06 PROCEDURE — 1159F MED LIST DOCD IN RCRD: CPT | Mod: CPTII,S$GLB,, | Performed by: PHYSICAL MEDICINE & REHABILITATION

## 2024-08-06 PROCEDURE — 3008F BODY MASS INDEX DOCD: CPT | Mod: CPTII,S$GLB,, | Performed by: PHYSICAL MEDICINE & REHABILITATION

## 2024-08-06 PROCEDURE — 1160F RVW MEDS BY RX/DR IN RCRD: CPT | Mod: CPTII,S$GLB,, | Performed by: PHYSICAL MEDICINE & REHABILITATION

## 2024-08-06 RX ORDER — METHOCARBAMOL 500 MG/1
TABLET, FILM COATED ORAL
Qty: 60 TABLET | Refills: 0 | Status: SHIPPED | OUTPATIENT
Start: 2024-08-06

## 2024-08-07 ENCOUNTER — TELEPHONE (OUTPATIENT)
Dept: NEUROSURGERY | Facility: CLINIC | Age: 54
End: 2024-08-07
Payer: COMMERCIAL

## 2024-08-19 ENCOUNTER — TELEPHONE (OUTPATIENT)
Dept: SPINE | Facility: CLINIC | Age: 54
End: 2024-08-19
Payer: COMMERCIAL

## 2024-08-19 ENCOUNTER — TELEPHONE (OUTPATIENT)
Dept: PAIN MEDICINE | Facility: CLINIC | Age: 54
End: 2024-08-19
Payer: COMMERCIAL

## 2024-08-19 NOTE — TELEPHONE ENCOUNTER
----- Message from Gabriel Redd MD sent at 8/19/2024  9:16 AM CDT -----  Contact: pt [phone]  I do not have any additional recommendations.  She can seek a surgeon on her own and I can place the referral at that time  ----- Message -----  From: Yazmin Ugarte LPN  Sent: 8/19/2024   9:12 AM CDT  To: Gabriel Redd MD    Pt wants to be referred to someone else...states she does not want to go to Dr. Self.  ----- Message -----  From: Savanna Nguyen  Sent: 8/19/2024   8:00 AM CDT  To: [facility] Staff    Type: Needs Medical Advice  Who Called:  Pt     Best Call Back Number: [phone]    Additional Information: Pt requesting a referral for a new surgeon. / Pt does nto want to see Dr Self. Pls call back

## 2024-08-19 NOTE — TELEPHONE ENCOUNTER
----- Message from Stacey M Lefort sent at 8/19/2024 11:07 AM CDT -----  Pt said to fax the referral to Dr Craig Wong at [phone]- fax

## 2024-08-20 ENCOUNTER — TELEPHONE (OUTPATIENT)
Dept: SPINE | Facility: CLINIC | Age: 54
End: 2024-08-20
Payer: COMMERCIAL

## 2024-08-20 ENCOUNTER — PATIENT MESSAGE (OUTPATIENT)
Dept: SPINE | Facility: CLINIC | Age: 54
End: 2024-08-20
Payer: COMMERCIAL

## 2024-08-20 DIAGNOSIS — M54.16 RIGHT LUMBAR RADICULITIS: Primary | ICD-10-CM

## 2024-08-21 ENCOUNTER — PATIENT MESSAGE (OUTPATIENT)
Dept: SPINE | Facility: CLINIC | Age: 54
End: 2024-08-21
Payer: COMMERCIAL

## 2024-08-21 RX ORDER — GABAPENTIN 400 MG/1
400 CAPSULE ORAL 3 TIMES DAILY
Qty: 90 CAPSULE | Refills: 2 | Status: SHIPPED | OUTPATIENT
Start: 2024-08-21

## 2024-09-13 DIAGNOSIS — M54.16 RIGHT LUMBAR RADICULITIS: ICD-10-CM

## 2024-09-16 RX ORDER — DICLOFENAC SODIUM 50 MG/1
50 TABLET, DELAYED RELEASE ORAL 3 TIMES DAILY PRN
Qty: 60 TABLET | Refills: 0 | Status: SHIPPED | OUTPATIENT
Start: 2024-09-16

## 2024-09-16 RX ORDER — TRAMADOL HYDROCHLORIDE 50 MG/1
50 TABLET ORAL EVERY 6 HOURS PRN
Qty: 28 TABLET | Refills: 0 | Status: SHIPPED | OUTPATIENT
Start: 2024-09-16

## 2024-09-16 RX ORDER — METHOCARBAMOL 500 MG/1
TABLET, FILM COATED ORAL
Qty: 60 TABLET | Refills: 0 | Status: SHIPPED | OUTPATIENT
Start: 2024-09-16

## 2024-09-25 ENCOUNTER — TELEPHONE (OUTPATIENT)
Dept: NEUROSURGERY | Facility: CLINIC | Age: 54
End: 2024-09-25
Payer: COMMERCIAL

## 2024-09-25 NOTE — TELEPHONE ENCOUNTER
Attempted to call and confirm that patient was still able to come to her scheduled appointment tomorrow with Dr. Ramirez at 10 AM. No answer. Left VM advising call back if appointment needs to be rescheduled.

## 2024-09-26 ENCOUNTER — OFFICE VISIT (OUTPATIENT)
Dept: NEUROSURGERY | Facility: CLINIC | Age: 54
End: 2024-09-26
Payer: COMMERCIAL

## 2024-09-26 VITALS — HEART RATE: 96 BPM | DIASTOLIC BLOOD PRESSURE: 84 MMHG | SYSTOLIC BLOOD PRESSURE: 143 MMHG

## 2024-09-26 DIAGNOSIS — M54.9 DORSALGIA, UNSPECIFIED: ICD-10-CM

## 2024-09-26 DIAGNOSIS — M54.16 RIGHT LUMBAR RADICULITIS: ICD-10-CM

## 2024-09-26 DIAGNOSIS — M47.26 OSTEOARTHRITIS OF SPINE WITH RADICULOPATHY, LUMBAR REGION: Primary | ICD-10-CM

## 2024-09-26 PROCEDURE — 99999 PR PBB SHADOW E&M-EST. PATIENT-LVL III: CPT | Mod: PBBFAC,,, | Performed by: NEUROLOGICAL SURGERY

## 2024-09-26 PROCEDURE — 1160F RVW MEDS BY RX/DR IN RCRD: CPT | Mod: CPTII,S$GLB,, | Performed by: NEUROLOGICAL SURGERY

## 2024-09-26 PROCEDURE — 3079F DIAST BP 80-89 MM HG: CPT | Mod: CPTII,S$GLB,, | Performed by: NEUROLOGICAL SURGERY

## 2024-09-26 PROCEDURE — 3077F SYST BP >= 140 MM HG: CPT | Mod: CPTII,S$GLB,, | Performed by: NEUROLOGICAL SURGERY

## 2024-09-26 PROCEDURE — 99204 OFFICE O/P NEW MOD 45 MIN: CPT | Mod: S$GLB,,, | Performed by: NEUROLOGICAL SURGERY

## 2024-09-26 PROCEDURE — 1159F MED LIST DOCD IN RCRD: CPT | Mod: CPTII,S$GLB,, | Performed by: NEUROLOGICAL SURGERY

## 2024-10-01 NOTE — PROGRESS NOTES
Neurosurgery  History & Physical    SUBJECTIVE:     Chief Complaint:  Low back pain and right leg pain.    History of Present Illness:  Ms. Crowder is a 54-year-old female who was referred to me by Dr. Gabriel Redd.  She has no significant past medical history.  She complains of a six-month history of low back pain with radiation into her right leg.  The pain radiates down the posterior aspect of her right leg to the top of her right foot.  She also complains of paresthesias in this distribution.  She complains of paresthesias in her left leg and foot as well.  This started after a couple of falls at work.  Currently, the pain is constant.  Walking makes the pain worse.  Lying down and sitting makes the pain better.  She denies any weakness of her lower extremities.  She denies any bowel or bladder incontinence.  She was tried both gabapentin and Flexeril which are not helping.  She was not tried physical therapy but she has had a right L5-S1 transforaminal injection which did not help her pain at all.    Review of patient's allergies indicates:  No Known Allergies    Current Outpatient Medications   Medication Sig Dispense Refill    diclofenac (VOLTAREN) 50 MG EC tablet Take 1 tablet (50 mg total) by mouth 3 (three) times daily as needed (Pain). 60 tablet 0    gabapentin (NEURONTIN) 400 MG capsule Take 1 capsule (400 mg total) by mouth 3 (three) times daily. Slowly titrate dose. Start 1 pill nightly x 2 days, then one pill morning and night for the following 2 days, then one pill 3x daily 90 capsule 2    hydrOXYzine HCL (ATARAX) 25 MG tablet Take 1 tablet (25 mg total) by mouth nightly. 10 tablet 0    methocarbamoL (ROBAXIN) 500 MG Tab Take 1-2 tablets 3 times a day as needed for back pain/spasms 60 tablet 0    traMADoL (ULTRAM) 50 mg tablet Take 1 tablet (50 mg total) by mouth nightly as needed for Pain. 8 tablet 0    traMADoL (ULTRAM) 50 mg tablet Take 1 tablet (50 mg total) by mouth every 6 (six) hours as  needed for Pain. 28 tablet 0     No current facility-administered medications for this visit.       History reviewed. No pertinent past medical history.  Past Surgical History:   Procedure Laterality Date    CHOLECYSTECTOMY      EPIDURAL STEROID INJECTION INTO LUMBAR SPINE N/A 6/6/2024    Procedure: Injection-steroid-epidural-lumbar;  Surgeon: Kenn Miramontes MD;  Location: [facility] OR;  Service: Anesthesiology;  Laterality: N/A;    TRANSFORAMINAL EPIDURAL INJECTION OF STEROID Right 7/11/2024    Procedure: Injection,steroid,epidural,transforaminal approach;  Surgeon: Kenn Miramontes MD;  Location: [facility] OR;  Service: Pain Management;  Laterality: Right;     Family History    None       Social History     Socioeconomic History    Marital status:    Tobacco Use    Smoking status: Never    Smokeless tobacco: Never   Substance and Sexual Activity    Alcohol use: Yes    Drug use: Never    Sexual activity: Not Currently       Review of Systems   Constitutional:  Negative for activity change, diaphoresis, fatigue, fever and unexpected weight change.   HENT:  Negative for hearing loss, nosebleeds, tinnitus and trouble swallowing.    Eyes:  Negative for visual disturbance.   Respiratory:  Negative for cough, shortness of breath and wheezing.    Cardiovascular:  Negative for chest pain and palpitations.   Gastrointestinal:  Negative for abdominal distention, abdominal pain, blood in stool, constipation, diarrhea, nausea and vomiting.   Endocrine: Negative for cold intolerance and heat intolerance.   Genitourinary:  Negative for difficulty urinating, dysuria, frequency and urgency.   Musculoskeletal:  Positive for back pain and myalgias. Negative for gait problem, joint swelling, neck pain and neck stiffness.   Skin:  Negative for color change, rash and wound.   Allergic/Immunologic: Negative for environmental allergies and food allergies.   Neurological:  Positive for numbness. Negative for dizziness, seizures, facial  asymmetry, speech difficulty, weakness, light-headedness and headaches.   Hematological:  Does not bruise/bleed easily.   Psychiatric/Behavioral:  Negative for agitation, behavioral problems, dysphoric mood and hallucinations. The patient is nervous/anxious.        OBJECTIVE:     Vital Signs  Pulse: 96  BP: (!) 143/84  Pain Score:   8  There is no height or weight on file to calculate BMI.      Physical Exam:  Vitals reviewed.    Constitutional: She appears well-developed and well-nourished. No distress.     Eyes: Pupils are equal, round, and reactive to light. Conjunctivae and EOM are normal.     Cardiovascular: Normal rate, regular rhythm, normal pulses and no edema.     Abdominal: Soft. Bowel sounds are normal.     Skin: Skin displays no rash on trunk and no rash on extremities. Skin displays no lesions on trunk and no lesions on extremities.     Psych/Behavior: She is alert. She is oriented to person, place, and time. She has a normal mood and affect.     Musculoskeletal: Gait is normal.        Neck: Range of motion is full. There is no tenderness. Muscle strength is 5/5. Tone is normal.        Back: Range of motion is full. There is no tenderness. Muscle strength is 5/5. Tone is normal.        Right Upper Extremities: Range of motion is full. There is no tenderness. Muscle strength is 5/5. Tone is normal.        Left Upper Extremities: Range of motion is full. There is no tenderness. Muscle strength is 5/5. Tone is normal.       Right Lower Extremities: Range of motion is full. There is no tenderness. Muscle strength is 5/5. Tone is normal.        Left Lower Extremities: Range of motion is full. There is no tenderness. Muscle strength is 5/5. Tone is normal.     Neurological:        Coordination: She has a normal Romberg Test, normal finger to nose coordination and normal tandem walking coordination.        Sensory: There is no sensory deficit in the trunk. There is no sensory deficit in the extremities.         DTRs: DTRs are DTRS NORMAL AND SYMMETRICnormal and symmetric. She displays no Babinski's sign on the right side. She displays no Babinski's sign on the left side.        Cranial nerves: Cranial nerve(s) II, III, IV, V, VI, VII, VIII, IX, X, XI and XII are intact.         Diagnostic Results:  The lumbar spine available for review which I personally reviewed.  This shows normal alignment of the lumbar spine.  There is multilevel lumbar spondylosis.  There is facet arthropathy at the L4-5 level.  At L4-5, there is a lateral disc herniation causing right L4-5 neural foraminal narrowing.  There is no significant central canal stenosis throughout the lumbar spine.    ASSESSMENT/PLAN:     Ms. Crowder is a 54-year-old female who complains of a six-month history of low back pain and right leg pain as described above.  She was a far lateral disc herniation on the right side at the L4-5 level.  Before making any further treatment decisions, I would like to get a CT scan of the lumbar spine to evaluate the bony anatomy as well as flexion-extension x-rays of the lumbar spine to evaluate for any instability.  Once these are done, I will see her back in follow-up and will make further treatment plan at that time.  Given the far laterality of the disc herniation, the patient may need a TLIF.  However, we will wait to get the updated imaging studies until we decide what surgical is appropriate.  She knows she can call with any further questions or concerns in the meantime.        Note dictated with voice recognition software, please excuse any grammatical errors.

## 2024-10-08 ENCOUNTER — PATIENT MESSAGE (OUTPATIENT)
Dept: SPINE | Facility: CLINIC | Age: 54
End: 2024-10-08
Payer: COMMERCIAL

## 2024-10-14 ENCOUNTER — HOSPITAL ENCOUNTER (EMERGENCY)
Facility: HOSPITAL | Age: 54
Discharge: HOME | End: 2024-10-14
Attending: EMERGENCY MEDICINE
Payer: COMMERCIAL

## 2024-10-14 VITALS
WEIGHT: 251.13 LBS | TEMPERATURE: 98 F | RESPIRATION RATE: 18 BRPM | HEART RATE: 78 BPM | SYSTOLIC BLOOD PRESSURE: 198 MMHG | BODY MASS INDEX: 45.93 KG/M2 | OXYGEN SATURATION: 100 % | DIASTOLIC BLOOD PRESSURE: 84 MMHG

## 2024-10-14 DIAGNOSIS — R03.0 ELEVATED BLOOD PRESSURE READING: Primary | ICD-10-CM

## 2024-10-14 DIAGNOSIS — R07.9 CHEST PAIN: ICD-10-CM

## 2024-10-14 DIAGNOSIS — J20.9 ACUTE BRONCHITIS, UNSPECIFIED ORGANISM: ICD-10-CM

## 2024-10-14 LAB
ALBUMIN SERPL BCP-MCNC: 3.1 G/DL (ref 3.5–5.2)
ALP SERPL-CCNC: 102 U/L (ref 55–135)
ALT SERPL W/O P-5'-P-CCNC: 68 U/L (ref 10–44)
ANION GAP SERPL CALC-SCNC: 9 MMOL/L (ref 8–16)
AST SERPL-CCNC: 90 U/L (ref 10–40)
BASOPHILS # BLD AUTO: 0.03 K/UL (ref 0–0.2)
BASOPHILS NFR BLD: 0.7 % (ref 0–1.9)
BILIRUB SERPL-MCNC: 0.9 MG/DL (ref 0.1–1)
BNP SERPL-MCNC: 123 PG/ML (ref 0–99)
BUN SERPL-MCNC: 7 MG/DL (ref 6–20)
CALCIUM SERPL-MCNC: 8.4 MG/DL (ref 8.7–10.5)
CHLORIDE SERPL-SCNC: 105 MMOL/L (ref 95–110)
CO2 SERPL-SCNC: 26 MMOL/L (ref 23–29)
CREAT SERPL-MCNC: 0.7 MG/DL (ref 0.5–1.4)
CTP QC/QA: YES
CTP QC/QA: YES
DIFFERENTIAL METHOD BLD: ABNORMAL
EOSINOPHIL # BLD AUTO: 0.1 K/UL (ref 0–0.5)
EOSINOPHIL NFR BLD: 3.1 % (ref 0–8)
ERYTHROCYTE [DISTWIDTH] IN BLOOD BY AUTOMATED COUNT: 12.4 % (ref 11.5–14.5)
EST. GFR  (NO RACE VARIABLE): >60 ML/MIN/1.73 M^2
GLUCOSE SERPL-MCNC: 104 MG/DL (ref 70–110)
HCT VFR BLD AUTO: 39.1 % (ref 37–48.5)
HEP C VIRUS HOLD SPECIMEN: NORMAL
HGB BLD-MCNC: 12.4 G/DL (ref 12–16)
IMM GRANULOCYTES # BLD AUTO: 0.01 K/UL (ref 0–0.04)
IMM GRANULOCYTES NFR BLD AUTO: 0.2 % (ref 0–0.5)
LYMPHOCYTES # BLD AUTO: 1.5 K/UL (ref 1–4.8)
LYMPHOCYTES NFR BLD: 35.2 % (ref 18–48)
MCH RBC QN AUTO: 29.7 PG (ref 27–31)
MCHC RBC AUTO-ENTMCNC: 31.7 G/DL (ref 32–36)
MCV RBC AUTO: 94 FL (ref 82–98)
MONOCYTES # BLD AUTO: 0.4 K/UL (ref 0.3–1)
MONOCYTES NFR BLD: 9.2 % (ref 4–15)
NEUTROPHILS # BLD AUTO: 2.2 K/UL (ref 1.8–7.7)
NEUTROPHILS NFR BLD: 51.6 % (ref 38–73)
NRBC BLD-RTO: 0 /100 WBC
PLATELET # BLD AUTO: 76 K/UL (ref 150–450)
PMV BLD AUTO: 11.5 FL (ref 9.2–12.9)
POC MOLECULAR INFLUENZA A AGN: NEGATIVE
POC MOLECULAR INFLUENZA B AGN: NEGATIVE
POTASSIUM SERPL-SCNC: 3.3 MMOL/L (ref 3.5–5.1)
PROT SERPL-MCNC: 6.8 G/DL (ref 6–8.4)
RBC # BLD AUTO: 4.18 M/UL (ref 4–5.4)
SARS-COV-2 RDRP RESP QL NAA+PROBE: NEGATIVE
SODIUM SERPL-SCNC: 140 MMOL/L (ref 136–145)
TROPONIN I SERPL DL<=0.01 NG/ML-MCNC: 0.03 NG/ML (ref 0–0.03)
WBC # BLD AUTO: 4.26 K/UL (ref 3.9–12.7)

## 2024-10-14 PROCEDURE — 86803 HEPATITIS C AB TEST: CPT | Performed by: EMERGENCY MEDICINE

## 2024-10-14 PROCEDURE — 96375 TX/PRO/DX INJ NEW DRUG ADDON: CPT | Mod: ER

## 2024-10-14 PROCEDURE — 87635 SARS-COV-2 COVID-19 AMP PRB: CPT | Mod: ER | Performed by: EMERGENCY MEDICINE

## 2024-10-14 PROCEDURE — 63600175 PHARM REV CODE 636 W HCPCS: Mod: ER | Performed by: EMERGENCY MEDICINE

## 2024-10-14 PROCEDURE — 80053 COMPREHEN METABOLIC PANEL: CPT | Mod: ER | Performed by: EMERGENCY MEDICINE

## 2024-10-14 PROCEDURE — 84484 ASSAY OF TROPONIN QUANT: CPT | Mod: ER | Performed by: EMERGENCY MEDICINE

## 2024-10-14 PROCEDURE — 83880 ASSAY OF NATRIURETIC PEPTIDE: CPT | Mod: ER | Performed by: EMERGENCY MEDICINE

## 2024-10-14 PROCEDURE — 87522 HEPATITIS C REVRS TRNSCRPJ: CPT | Performed by: EMERGENCY MEDICINE

## 2024-10-14 PROCEDURE — 87502 INFLUENZA DNA AMP PROBE: CPT | Mod: ER

## 2024-10-14 PROCEDURE — 85025 COMPLETE CBC W/AUTO DIFF WBC: CPT | Mod: ER | Performed by: EMERGENCY MEDICINE

## 2024-10-14 PROCEDURE — 25000003 PHARM REV CODE 250: Mod: ER | Performed by: EMERGENCY MEDICINE

## 2024-10-14 PROCEDURE — 99284 EMERGENCY DEPT VISIT MOD MDM: CPT | Mod: 25,ER

## 2024-10-14 PROCEDURE — 96374 THER/PROPH/DIAG INJ IV PUSH: CPT | Mod: ER

## 2024-10-14 PROCEDURE — 87389 HIV-1 AG W/HIV-1&-2 AB AG IA: CPT | Performed by: EMERGENCY MEDICINE

## 2024-10-14 RX ORDER — HYDRALAZINE HYDROCHLORIDE 20 MG/ML
10 INJECTION INTRAMUSCULAR; INTRAVENOUS
Status: COMPLETED | OUTPATIENT
Start: 2024-10-14 | End: 2024-10-14

## 2024-10-14 RX ORDER — LEVOFLOXACIN 750 MG/1
750 TABLET ORAL DAILY
Qty: 5 TABLET | Refills: 0 | Status: SHIPPED | OUTPATIENT
Start: 2024-10-14 | End: 2024-10-19

## 2024-10-14 RX ORDER — BENZONATATE 100 MG/1
100 CAPSULE ORAL 3 TIMES DAILY PRN
Qty: 20 CAPSULE | Refills: 0 | Status: SHIPPED | OUTPATIENT
Start: 2024-10-14

## 2024-10-14 RX ORDER — POTASSIUM CHLORIDE 20 MEQ/1
40 TABLET, EXTENDED RELEASE ORAL
Status: COMPLETED | OUTPATIENT
Start: 2024-10-14 | End: 2024-10-14

## 2024-10-14 RX ORDER — PROMETHAZINE HYDROCHLORIDE AND DEXTROMETHORPHAN HYDROBROMIDE 6.25; 15 MG/5ML; MG/5ML
5 SYRUP ORAL EVERY 4 HOURS PRN
Qty: 118 ML | Refills: 0 | Status: SHIPPED | OUTPATIENT
Start: 2024-10-14

## 2024-10-14 RX ORDER — CLONIDINE HYDROCHLORIDE 0.1 MG/1
0.1 TABLET ORAL
Status: COMPLETED | OUTPATIENT
Start: 2024-10-14 | End: 2024-10-14

## 2024-10-14 RX ORDER — ACETAMINOPHEN 500 MG
1000 TABLET ORAL
Status: COMPLETED | OUTPATIENT
Start: 2024-10-14 | End: 2024-10-14

## 2024-10-14 RX ORDER — AMLODIPINE BESYLATE 5 MG/1
5 TABLET ORAL DAILY PRN
Qty: 30 TABLET | Refills: 0 | Status: SHIPPED | OUTPATIENT
Start: 2024-10-14

## 2024-10-14 RX ORDER — AMLODIPINE BESYLATE 5 MG/1
5 TABLET ORAL
Status: COMPLETED | OUTPATIENT
Start: 2024-10-14 | End: 2024-10-14

## 2024-10-14 RX ORDER — KETOROLAC TROMETHAMINE 30 MG/ML
15 INJECTION, SOLUTION INTRAMUSCULAR; INTRAVENOUS
Status: COMPLETED | OUTPATIENT
Start: 2024-10-14 | End: 2024-10-14

## 2024-10-14 RX ADMIN — ACETAMINOPHEN 1000 MG: 500 TABLET ORAL at 04:10

## 2024-10-14 RX ADMIN — HYDRALAZINE HYDROCHLORIDE 10 MG: 20 INJECTION, SOLUTION INTRAMUSCULAR; INTRAVENOUS at 03:10

## 2024-10-14 RX ADMIN — AMLODIPINE BESYLATE 5 MG: 5 TABLET ORAL at 04:10

## 2024-10-14 RX ADMIN — KETOROLAC TROMETHAMINE 15 MG: 30 INJECTION, SOLUTION INTRAMUSCULAR at 03:10

## 2024-10-14 RX ADMIN — CLONIDINE HYDROCHLORIDE 0.1 MG: 0.1 TABLET ORAL at 04:10

## 2024-10-14 RX ADMIN — POTASSIUM CHLORIDE 40 MEQ: 1500 TABLET, EXTENDED RELEASE ORAL at 03:10

## 2024-10-14 NOTE — Clinical Note
"Selwyn Crowder (Toni) was seen and treated in our emergency department on 10/14/2024.  She may return to work on 10/16/2024.       If you have any questions or concerns, please don't hesitate to call.      Charlene Haq RN    "

## 2024-10-14 NOTE — ED PROVIDER NOTES
Encounter Date: 10/14/2024       History     Chief Complaint   Patient presents with    Cough     Cough x 2 weeks, body aches. Green mucus when blowing nose. Productive cough. Chest bothering her from cough and feels like she is breathing funny.      Patient is a 54-year-old female who presents to the emergency department with a chief complaint of cough for 2 weeks.  Associated symptoms include myalgias, sputum production.  Denies any fever.  She does report some chest pain recently and some mild shortness of breath.  Cough is made worse at night.  Denies any pedal edema.  Patient does not take any medicines regularly.  Denies a history of essential hypertension      Review of patient's allergies indicates:  No Known Allergies  History reviewed. No pertinent past medical history.  Past Surgical History:   Procedure Laterality Date    CHOLECYSTECTOMY      EPIDURAL STEROID INJECTION INTO LUMBAR SPINE N/A 6/6/2024    Procedure: Injection-steroid-epidural-lumbar;  Surgeon: Kenn Miramontes MD;  Location: [facility] OR;  Service: Anesthesiology;  Laterality: N/A;    TRANSFORAMINAL EPIDURAL INJECTION OF STEROID Right 7/11/2024    Procedure: Injection,steroid,epidural,transforaminal approach;  Surgeon: Kenn Miramontes MD;  Location: [facility] OR;  Service: Pain Management;  Laterality: Right;     No family history on file.  Social History     Tobacco Use    Smoking status: Never    Smokeless tobacco: Never   Substance Use Topics    Alcohol use: Yes    Drug use: Never     Review of Systems   Constitutional:  Negative for chills, diaphoresis and fever.   HENT:  Negative for congestion, rhinorrhea and sore throat.    Eyes:  Negative for pain, redness and visual disturbance.   Respiratory:  Positive for cough and shortness of breath.         Sputum production   Cardiovascular:  Positive for chest pain. Negative for palpitations and leg swelling.   Gastrointestinal:  Negative for abdominal distention, abdominal pain, constipation,  diarrhea, nausea and vomiting.   Genitourinary:  Negative for dysuria and hematuria.   Musculoskeletal:  Positive for myalgias. Negative for arthralgias and joint swelling.   Skin:  Negative for rash and wound.   Neurological:  Negative for seizures, syncope and headaches.   All other systems reviewed and are negative.      Physical Exam     Initial Vitals [10/14/24 1146]   BP Pulse Resp Temp SpO2   (!) 190/85 92 18 98.1 °F (36.7 °C) 96 %      MAP       --         Physical Exam    Nursing note and vitals reviewed.  Constitutional: No distress.   Increased BMI   HENT:   Head: Normocephalic and atraumatic. Mouth/Throat: Oropharynx is clear and moist.   Eyes: Conjunctivae and EOM are normal. Pupils are equal, round, and reactive to light.   Neck: Neck supple. No tracheal deviation present.   Cardiovascular:  Normal rate, regular rhythm, normal heart sounds and intact distal pulses.           Pulmonary/Chest: Breath sounds normal. No respiratory distress.   Abdominal: Abdomen is soft. She exhibits no distension. There is no abdominal tenderness. There is no rebound and no guarding.   Musculoskeletal:         General: No tenderness. Normal range of motion.      Cervical back: Neck supple.      Comments: No unilateral leg width discrepancy     Neurological: She is alert and oriented to person, place, and time. GCS score is 15. GCS eye subscore is 4. GCS verbal subscore is 5. GCS motor subscore is 6.   No focal deficits   Skin: Skin is warm. No rash noted. No erythema.   Psychiatric: She has a normal mood and affect. Her behavior is normal.         ED Course   Procedures  Labs Reviewed   CBC W/ AUTO DIFFERENTIAL - Abnormal       Result Value    WBC 4.26      RBC 4.18      Hemoglobin 12.4      Hematocrit 39.1      MCV 94      MCH 29.7      MCHC 31.7 (*)     RDW 12.4      Platelets 76 (*)     MPV 11.5      Immature Granulocytes 0.2      Gran # (ANC) 2.2      Immature Grans (Abs) 0.01      Lymph # 1.5      Mono # 0.4      Eos  # 0.1      Baso # 0.03      nRBC 0      Gran % 51.6      Lymph % 35.2      Mono % 9.2      Eosinophil % 3.1      Basophil % 0.7      Differential Method Automated     COMPREHENSIVE METABOLIC PANEL - Abnormal    Sodium 140      Potassium 3.3 (*)     Chloride 105      CO2 26      Glucose 104      BUN 7      Creatinine 0.7      Calcium 8.4 (*)     Total Protein 6.8      Albumin 3.1 (*)     Total Bilirubin 0.9      Alkaline Phosphatase 102      AST 90 (*)     ALT 68 (*)     eGFR >60.0      Anion Gap 9     B-TYPE NATRIURETIC PEPTIDE - Abnormal     (*)    TROPONIN I    Troponin I 0.025     HIV 1 / 2 ANTIBODY   HEPATITIS C ANTIBODY   HEP C VIRUS HOLD SPECIMEN   SARS-COV-2 RDRP GENE    POC Rapid COVID Negative       Acceptable Yes     POCT INFLUENZA A/B MOLECULAR    POC Molecular Influenza A Ag Negative      POC Molecular Influenza B Ag Negative       Acceptable Yes       EKG Readings: (Independently Interpreted)   EKG reviewed and interpreted by ED provider as normal sinus rhythm with a rate of 66, normal axis, normal intervals, normal ST-T segments       Imaging Results              X-Ray Chest AP Portable (Final result)  Result time 10/14/24 12:32:15      Final result by Shan Okeefe MD (10/14/24 12:32:15)                   Impression:      1.  Negative for acute process involving the chest.    2.  Stable and incidental findings as noted above.      Electronically signed by: Shan Okeefe MD  Date:    10/14/2024  Time:    12:32               Narrative:    EXAMINATION:  XR CHEST AP PORTABLE    CLINICAL HISTORY:  cough;    COMPARISON:  August 28, 2023    FINDINGS:  Radiodensity overlying the descending thoracic aorta is likely external to the patient.  The lungs are clear. The cardiac silhouette size is normal. The trachea is midline and the mediastinal width is normal. Negative for focal infiltrate, effusion or pneumothorax. Pulmonary vasculature is normal. Negative for osseous  abnormalities. Stable marginal spondylosis and tortuosity of the aorta.  Stable degenerative changes of the shoulder girdles with calcific tendinitis involving the right superior humeral head region.                                       Medications   hydrALAZINE injection 10 mg (10 mg Intravenous Given 10/14/24 1510)   ketorolac injection 15 mg (15 mg Intravenous Given 10/14/24 1510)   potassium chloride SA CR tablet 40 mEq (40 mEq Oral Given 10/14/24 1510)   acetaminophen tablet 1,000 mg (1,000 mg Oral Given 10/14/24 1602)   cloNIDine tablet 0.1 mg (0.1 mg Oral Given 10/14/24 1602)   amLODIPine tablet 5 mg (5 mg Oral Given 10/14/24 1636)     Medical Decision Making  Productive cough with myalgias x2 weeks.  Differential diagnosis includes but not limited to acute bronchitis, pneumonia, pulmonary edema, new onset congestive heart failure.  X-ray shows no edema.  No pneumonia.  Cardiac workup within normal limits except for a mildly elevated BNP.  Patient is hypertensive here and has no history of hypertension.  Multiple prior outpatient visits were reviewed, which confirmed no history of hypertension.  Her blood pressure was treated here in the emergency department.  She was advised to follow up with the primary care physician and to monitor blood pressures with a blood pressure log.  She was prescribed amlodipine should her pressure remains significantly elevated like this to bridge her to that primary care physician appointment.  ER return precautions provided    Amount and/or Complexity of Data Reviewed  External Data Reviewed: notes.     Details: Past medical history reviewed.  Medications reviewed.  Prior outpatient notes reviewed.  Labs: ordered. Decision-making details documented in ED Course.  Radiology: ordered and independent interpretation performed. Decision-making details documented in ED Course.  ECG/medicine tests: ordered and independent interpretation performed. Decision-making details documented  in ED Course.    Risk  OTC drugs.  Prescription drug management.                     Vitals:    10/14/24 1515 10/14/24 1535 10/14/24 1550 10/14/24 1630   BP: (!) 194/79 (!) 181/77 (!) 194/90 (!) 198/84   Pulse: 78 78     Resp:  18     Temp:       TempSrc:       SpO2: 100% 100%     Weight:                          Clinical Impression:  Final diagnoses:  [R07.9] Chest pain  [R03.0] Elevated blood pressure reading (Primary)  [J20.9] Acute bronchitis, unspecified organism          ED Disposition Condition    Discharge Stable          ED Prescriptions       Medication Sig Dispense Start Date End Date Auth. Provider    amLODIPine (NORVASC) 5 MG tablet Take 1 tablet (5 mg total) by mouth daily as needed (blood pressure greater than 160/100). 30 tablet 10/14/2024 -- Mann Marcano MD    levoFLOXacin (LEVAQUIN) 750 MG tablet Take 1 tablet (750 mg total) by mouth once daily. for 5 days 5 tablet 10/14/2024 10/19/2024 Mann Marcano MD    benzonatate (TESSALON) 100 MG capsule Take 1 capsule (100 mg total) by mouth 3 (three) times daily as needed for Cough. 20 capsule 10/14/2024 -- Mann Marcano MD    promethazine-dextromethorphan (PROMETHAZINE-DM) 6.25-15 mg/5 mL Syrp Take 5 mLs by mouth every 4 (four) hours as needed (cough). 118 mL 10/14/2024 -- Mann Marcano MD          Follow-up Information       Follow up With Specialties Details Why Contact Info    Randi Avery NP Family Medicine Schedule an appointment as soon as possible for a visit   [address] [place] [id number]  [phone]      [facility] - Emergency Dept Emergency Medicine  As needed, If symptoms worsen [address] [place] [address] 1  Emergency Department  [facility] [id number] [phone]             Mann Marcano MD  10/14/24 4808

## 2024-10-15 LAB
HCV AB SERPL QL IA: POSITIVE
HCV RNA SERPL NAA+PROBE-LOG IU: 5.29 LOGIU/ML
HCV RNA SERPL QL NAA+PROBE: DETECTED
HCV RNA SPEC NAA+PROBE-ACNC: ABNORMAL IU/ML
HIV 1+2 AB+HIV1 P24 AG SERPL QL IA: NEGATIVE
OHS QRS DURATION: 74 MS
OHS QTC CALCULATION: 463 MS

## 2024-10-21 ENCOUNTER — TELEPHONE (OUTPATIENT)
Dept: EMERGENCY MEDICINE | Facility: HOSPITAL | Age: 54
End: 2024-10-21
Payer: COMMERCIAL

## 2024-10-21 DIAGNOSIS — R76.8 HEPATITIS C ANTIBODY POSITIVE IN BLOOD: Primary | ICD-10-CM

## 2024-10-24 ENCOUNTER — OFFICE VISIT (OUTPATIENT)
Dept: NEUROSURGERY | Facility: CLINIC | Age: 54
End: 2024-10-24
Payer: COMMERCIAL

## 2024-10-24 ENCOUNTER — HOSPITAL ENCOUNTER (OUTPATIENT)
Dept: RADIOLOGY | Facility: HOSPITAL | Age: 54
Discharge: HOME | End: 2024-10-24
Attending: NEUROLOGICAL SURGERY
Payer: COMMERCIAL

## 2024-10-24 VITALS
SYSTOLIC BLOOD PRESSURE: 138 MMHG | BODY MASS INDEX: 42.68 KG/M2 | HEART RATE: 81 BPM | DIASTOLIC BLOOD PRESSURE: 82 MMHG | WEIGHT: 250 LBS | HEIGHT: 64 IN

## 2024-10-24 DIAGNOSIS — M54.9 DORSALGIA, UNSPECIFIED: ICD-10-CM

## 2024-10-24 DIAGNOSIS — M47.26 OSTEOARTHRITIS OF SPINE WITH RADICULOPATHY, LUMBAR REGION: ICD-10-CM

## 2024-10-24 DIAGNOSIS — M54.16 RIGHT LUMBAR RADICULITIS: ICD-10-CM

## 2024-10-24 DIAGNOSIS — M47.26 OSTEOARTHRITIS OF SPINE WITH RADICULOPATHY, LUMBAR REGION: Primary | ICD-10-CM

## 2024-10-24 PROCEDURE — 99215 OFFICE O/P EST HI 40 MIN: CPT | Mod: S$GLB,,, | Performed by: NEUROLOGICAL SURGERY

## 2024-10-24 PROCEDURE — 3008F BODY MASS INDEX DOCD: CPT | Mod: CPTII,S$GLB,, | Performed by: NEUROLOGICAL SURGERY

## 2024-10-24 PROCEDURE — 72114 X-RAY EXAM L-S SPINE BENDING: CPT | Mod: TC

## 2024-10-24 PROCEDURE — 99999 PR PBB SHADOW E&M-EST. PATIENT-LVL III: CPT | Mod: PBBFAC,,, | Performed by: NEUROLOGICAL SURGERY

## 2024-10-24 PROCEDURE — 1159F MED LIST DOCD IN RCRD: CPT | Mod: CPTII,S$GLB,, | Performed by: NEUROLOGICAL SURGERY

## 2024-10-24 PROCEDURE — 72114 X-RAY EXAM L-S SPINE BENDING: CPT | Mod: 26,,, | Performed by: RADIOLOGY

## 2024-10-24 PROCEDURE — 72131 CT LUMBAR SPINE W/O DYE: CPT | Mod: 26,,, | Performed by: RADIOLOGY

## 2024-10-24 PROCEDURE — 72131 CT LUMBAR SPINE W/O DYE: CPT | Mod: TC

## 2024-10-24 PROCEDURE — 1160F RVW MEDS BY RX/DR IN RCRD: CPT | Mod: CPTII,S$GLB,, | Performed by: NEUROLOGICAL SURGERY

## 2024-10-24 PROCEDURE — 3075F SYST BP GE 130 - 139MM HG: CPT | Mod: CPTII,S$GLB,, | Performed by: NEUROLOGICAL SURGERY

## 2024-10-24 PROCEDURE — 3079F DIAST BP 80-89 MM HG: CPT | Mod: CPTII,S$GLB,, | Performed by: NEUROLOGICAL SURGERY

## 2024-10-24 RX ORDER — METHOCARBAMOL 500 MG/1
TABLET, FILM COATED ORAL
Qty: 60 TABLET | Refills: 0 | Status: SHIPPED | OUTPATIENT
Start: 2024-10-24

## 2024-10-25 NOTE — PROGRESS NOTES
Neurosurgery  Established Patient    SUBJECTIVE:     History of Present Illness:  Ms. Crowder is a 54-year-old female who was referred to me by Dr. Gabriel Redd. She has no significant past medical history. She complains of a six-month history of low back pain with radiation into her right leg. The pain radiates down the posterior aspect of her right leg to the top of her right foot. She also complains of paresthesias in this distribution. She complains of paresthesias in her left leg and foot as well. This started after a couple of falls at work. Currently, the pain is constant. Walking makes the pain worse. Lying down and sitting makes the pain better. She denies any weakness of her lower extremities. She denies any bowel or bladder incontinence. She was tried both gabapentin and Flexeril which are not helping. She was not tried physical therapy but she has had a right L5-S1 transforaminal injection which did not help her pain at all.     Interval History 10/24/2024:  Ms. Crowder 4-year-old female who is seeing me today in follow-up.  Her last neurosurgery clinic appointment was on September 26, 2024.  At that time, she complained of a several month history of low back pain and right lower extremity radiculopathy as described above.  She had tried and failed physical therapy including physical therapy and a transforaminal epidural steroid injection.  Imaging studies showed L4-L5 neural foraminal narrowing.  In order to better evaluate her pathology, I had ordered a CT of the lumbar spine as well as flexion-extension x-rays of the lumbar spine.  She was here today to see me in follow-up.  Her pain is the same as it was at the time of her last clinic appointment.  She complains of back pain equal to leg pain.  She complains of worsening pain down her right leg as well as worsening right foot paresthesias.  She has subjective weakness in her right foot.    Review of patient's allergies indicates:  No Known  Allergies    Current Outpatient Medications   Medication Sig Dispense Refill    amLODIPine (NORVASC) 5 MG tablet Take 1 tablet (5 mg total) by mouth daily as needed (blood pressure greater than 160/100). 30 tablet 0    benzonatate (TESSALON) 100 MG capsule Take 1 capsule (100 mg total) by mouth 3 (three) times daily as needed for Cough. 20 capsule 0    diclofenac (VOLTAREN) 50 MG EC tablet Take 1 tablet (50 mg total) by mouth 3 (three) times daily as needed (Pain). 60 tablet 0    gabapentin (NEURONTIN) 400 MG capsule Take 1 capsule (400 mg total) by mouth 3 (three) times daily. Slowly titrate dose. Start 1 pill nightly x 2 days, then one pill morning and night for the following 2 days, then one pill 3x daily 90 capsule 2    hydrOXYzine HCL (ATARAX) 25 MG tablet Take 1 tablet (25 mg total) by mouth nightly. 10 tablet 0    methocarbamoL (ROBAXIN) 500 MG Tab Take 1-2 tablets 3 times a day as needed for back pain/spasms 60 tablet 0    promethazine-dextromethorphan (PROMETHAZINE-DM) 6.25-15 mg/5 mL Syrp Take 5 mLs by mouth every 4 (four) hours as needed (cough). 118 mL 0    traMADoL (ULTRAM) 50 mg tablet Take 1 tablet (50 mg total) by mouth nightly as needed for Pain. 8 tablet 0    traMADoL (ULTRAM) 50 mg tablet Take 1 tablet (50 mg total) by mouth every 6 (six) hours as needed for Pain. 28 tablet 0     No current facility-administered medications for this visit.       History reviewed. No pertinent past medical history.  Past Surgical History:   Procedure Laterality Date    CHOLECYSTECTOMY      EPIDURAL STEROID INJECTION INTO LUMBAR SPINE N/A 6/6/2024    Procedure: Injection-steroid-epidural-lumbar;  Surgeon: Kenn Miramontes MD;  Location: [facility] OR;  Service: Anesthesiology;  Laterality: N/A;    TRANSFORAMINAL EPIDURAL INJECTION OF STEROID Right 7/11/2024    Procedure: Injection,steroid,epidural,transforaminal approach;  Surgeon: Kenn Miramontes MD;  Location: [facility] OR;  Service: Pain Management;  Laterality: Right;  "    Family History    None       Social History     Socioeconomic History    Marital status:    Tobacco Use    Smoking status: Never    Smokeless tobacco: Never   Substance and Sexual Activity    Alcohol use: Yes    Drug use: Never    Sexual activity: Not Currently       Review of Systems    OBJECTIVE:     Vital Signs  Pulse: 81  BP: 138/82  Pain Score: 0-No pain  Height: 5' 4" (162.6 cm)  Weight: 113.4 kg (250 lb)  Body mass index is 42.91 kg/m².    Physical Exam:  Vitals reviewed.    Constitutional: She appears well-developed and well-nourished. No distress.     Eyes: Pupils are equal, round, and reactive to light. Conjunctivae and EOM are normal.     Cardiovascular: Normal rate, regular rhythm, normal pulses and no edema.     Abdominal: Soft. Bowel sounds are normal.     Skin: Skin displays no rash on trunk and no rash on extremities. Skin displays no lesions on trunk and no lesions on extremities.     Psych/Behavior: She is alert. She is oriented to person, place, and time. She has a normal mood and affect.     Musculoskeletal: Gait is normal.        Neck: Range of motion is full. There is no tenderness. Muscle strength is 5/5. Tone is normal.        Back: Range of motion is full. There is no tenderness. Muscle strength is 5/5. Tone is normal.        Right Upper Extremities: Range of motion is full. There is no tenderness. Muscle strength is 5/5. Tone is normal.        Left Upper Extremities: Range of motion is full. There is no tenderness. Muscle strength is 5/5. Tone is normal.       Right Lower Extremities: Range of motion is full. There is no tenderness. Muscle strength is 5/5. Tone is normal.        Left Lower Extremities: Range of motion is full. There is no tenderness. Muscle strength is 5/5. Tone is normal.     Neurological:        Coordination: She has a normal Romberg Test, normal finger to nose coordination and normal tandem walking coordination.        Sensory: There is no sensory deficit in " the trunk. There is no sensory deficit in the extremities.        DTRs: DTRs are DTRS NORMAL AND SYMMETRICnormal and symmetric. She displays no Babinski's sign on the right side. She displays no Babinski's sign on the left side.        Cranial nerves: Cranial nerve(s) II, III, IV, V, VI, VII, VIII, IX, X, XI and XII are intact.         Diagnostic Results:  She has as CT lumbar spine available for review which I personally reviewed. This shows significant facet arthropathy at L4-5 bilaterally.  The lateral disc herniation on the right side at the L4-L5 level previously identified on MRIs actually disc osteophyte complex with significant calcification narrowing the right L4-5 neural foramen.    She was flexion-extension x-rays of the lumbar spine available for review which I personally reviewed.  This shows no abnormal movement in flexion or extension views.    ASSESSMENT/PLAN:     Ms. Crowder is a 54-year-old female with a several month history of low back pain and right lower extremity radiculopathy as described above.  Imaging studies shows severe L4-L5 facet arthropathy as well as right neural foraminal narrowing.  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  Given the disc osteophyte complex, I do not believe that the patient would benefit simply from a minimally invasive decompression and/or microdiskectomy.  Given the disc osteophyte complex I do believe that a L4-L5 TLIF is warranted.  I explained the procedure in detail to the patient as well as the risks and benefits and pros and cons.  The patient wishes to proceed at this time.  We will use augmented reality with [employer] for pedicle screw placement.  We will get all the appropriate preoperative testing and schedule surgery in the near future.  She knows that she needs to be off of all aspirin-containing products as well as all GLP 1 inhibitors prior to surgery.  She knows she can call with  any further questions in the meantime.  I will order Robaxin for her in the meantime prior to surgery to help with some of her pain.        Note dictated with voice recognition software, please excuse any grammatical errors.

## 2024-10-28 ENCOUNTER — PATIENT MESSAGE (OUTPATIENT)
Dept: NEUROSURGERY | Facility: CLINIC | Age: 54
End: 2024-10-28
Payer: COMMERCIAL

## 2024-10-30 ENCOUNTER — PATIENT MESSAGE (OUTPATIENT)
Dept: NEUROSURGERY | Facility: CLINIC | Age: 54
End: 2024-10-30
Payer: COMMERCIAL

## 2024-10-30 ENCOUNTER — TELEPHONE (OUTPATIENT)
Dept: NEUROSURGERY | Facility: CLINIC | Age: 54
End: 2024-10-30
Payer: COMMERCIAL

## 2024-10-30 DIAGNOSIS — M47.26 OTHER SPONDYLOSIS WITH RADICULOPATHY, LUMBAR REGION: Primary | ICD-10-CM

## 2024-11-06 DIAGNOSIS — M54.16 RIGHT LUMBAR RADICULITIS: Primary | ICD-10-CM

## 2024-11-07 ENCOUNTER — PATIENT MESSAGE (OUTPATIENT)
Dept: NEUROSURGERY | Facility: CLINIC | Age: 54
End: 2024-11-07
Payer: COMMERCIAL

## 2024-11-11 ENCOUNTER — TELEPHONE (OUTPATIENT)
Dept: NEUROSURGERY | Facility: CLINIC | Age: 54
End: 2024-11-11
Payer: COMMERCIAL

## 2024-11-11 NOTE — TELEPHONE ENCOUNTER
Spoke with pt and advised her pw has been resent as of this morning , pt was extremely rude.     Evelia Quiñones MA  [facility]  Neurosurgery.

## 2024-11-13 DIAGNOSIS — Z01.818 PREOPERATIVE TESTING: Primary | ICD-10-CM

## 2024-11-14 ENCOUNTER — TELEPHONE (OUTPATIENT)
Dept: PREADMISSION TESTING | Facility: HOSPITAL | Age: 54
End: 2024-11-14
Payer: COMMERCIAL

## 2024-11-14 NOTE — TELEPHONE ENCOUNTER
----- Message from Nurse Barakat sent at 11/13/2024  1:55 PM CST -----  Surgery 12/11  Please schedule NP, labs and ua. If NP not available then Dr. Khan.  Please schedule at 14 days prior to surgery date if possible per Dr.Leopold.( or as close to 14 days prior, like 13 days)  Thanks!

## 2024-11-22 ENCOUNTER — PATIENT MESSAGE (OUTPATIENT)
Dept: RESEARCH | Facility: HOSPITAL | Age: 54
End: 2024-11-22
Payer: COMMERCIAL

## 2024-12-01 PROBLEM — M47.26 OTHER SPONDYLOSIS WITH RADICULOPATHY, LUMBAR REGION: Status: ACTIVE | Noted: 2024-12-01

## 2024-12-06 ENCOUNTER — TELEPHONE (OUTPATIENT)
Dept: NEUROSURGERY | Facility: CLINIC | Age: 54
End: 2024-12-06
Payer: COMMERCIAL

## 2024-12-06 NOTE — TELEPHONE ENCOUNTER
Called pt regarding arrival time for surgery . Pt did not answer I lvm with call back number.     Evelia Quiñones MA  [facility]  Neurosurgery.

## 2024-12-09 ENCOUNTER — TELEPHONE (OUTPATIENT)
Dept: NEUROSURGERY | Facility: CLINIC | Age: 54
End: 2024-12-09
Payer: MEDICAID

## 2024-12-09 ENCOUNTER — OFFICE VISIT (OUTPATIENT)
Dept: INTERNAL MEDICINE | Facility: CLINIC | Age: 54
End: 2024-12-09
Payer: COMMERCIAL

## 2024-12-09 ENCOUNTER — HOSPITAL ENCOUNTER (INPATIENT)
Facility: HOSPITAL | Age: 54
LOS: 5 days | Discharge: HOME | DRG: 872 | End: 2024-12-14
Attending: STUDENT IN AN ORGANIZED HEALTH CARE EDUCATION/TRAINING PROGRAM | Admitting: HOSPITALIST
Payer: MEDICAID

## 2024-12-09 VITALS
SYSTOLIC BLOOD PRESSURE: 148 MMHG | TEMPERATURE: 102 F | DIASTOLIC BLOOD PRESSURE: 66 MMHG | HEART RATE: 129 BPM | OXYGEN SATURATION: 99 % | BODY MASS INDEX: 41.51 KG/M2 | HEIGHT: 65 IN | WEIGHT: 249.13 LBS

## 2024-12-09 DIAGNOSIS — E66.01 MORBID OBESITY: ICD-10-CM

## 2024-12-09 DIAGNOSIS — R00.0 TACHYCARDIA: ICD-10-CM

## 2024-12-09 DIAGNOSIS — I82.409 DVT (DEEP VENOUS THROMBOSIS): ICD-10-CM

## 2024-12-09 DIAGNOSIS — L08.9 SOFT TISSUE INFECTION: ICD-10-CM

## 2024-12-09 DIAGNOSIS — L03.115 CELLULITIS OF RIGHT LOWER EXTREMITY: Primary | ICD-10-CM

## 2024-12-09 DIAGNOSIS — R60.0 LEG EDEMA: ICD-10-CM

## 2024-12-09 DIAGNOSIS — R79.89 ELEVATED BRAIN NATRIURETIC PEPTIDE (BNP) LEVEL: ICD-10-CM

## 2024-12-09 PROBLEM — I82.4Y1 ACUTE DEEP VEIN THROMBOSIS (DVT) OF PROXIMAL VEIN OF RIGHT LOWER EXTREMITY: Status: ACTIVE | Noted: 2024-12-09

## 2024-12-09 PROBLEM — I10 HTN (HYPERTENSION): Status: ACTIVE | Noted: 2024-12-09

## 2024-12-09 LAB
ALBUMIN SERPL BCP-MCNC: 2.8 G/DL (ref 3.5–5.2)
ALLENS TEST: NORMAL
ALP SERPL-CCNC: 93 U/L (ref 40–150)
ALT SERPL W/O P-5'-P-CCNC: 48 U/L (ref 10–44)
ANION GAP SERPL CALC-SCNC: 8 MMOL/L (ref 8–16)
APTT PPP: 70.6 SEC (ref 21–32)
AST SERPL-CCNC: 56 U/L (ref 10–40)
BACTERIA #/AREA URNS AUTO: NORMAL /HPF
BASOPHILS # BLD AUTO: 0.02 K/UL (ref 0–0.2)
BASOPHILS # BLD AUTO: 0.03 K/UL (ref 0–0.2)
BASOPHILS NFR BLD: 0.2 % (ref 0–1.9)
BASOPHILS NFR BLD: 0.4 % (ref 0–1.9)
BILIRUB SERPL-MCNC: 0.9 MG/DL (ref 0.1–1)
BILIRUB UR QL STRIP: NEGATIVE
BUN SERPL-MCNC: 14 MG/DL (ref 6–20)
CALCIUM SERPL-MCNC: 8.1 MG/DL (ref 8.7–10.5)
CHLORIDE SERPL-SCNC: 107 MMOL/L (ref 95–110)
CLARITY UR REFRACT.AUTO: CLEAR
CO2 SERPL-SCNC: 23 MMOL/L (ref 23–29)
COLOR UR AUTO: YELLOW
CREAT SERPL-MCNC: 0.8 MG/DL (ref 0.5–1.4)
DIFFERENTIAL METHOD BLD: ABNORMAL
DIFFERENTIAL METHOD BLD: ABNORMAL
EOSINOPHIL # BLD AUTO: 0 K/UL (ref 0–0.5)
EOSINOPHIL # BLD AUTO: 0 K/UL (ref 0–0.5)
EOSINOPHIL NFR BLD: 0.2 % (ref 0–8)
EOSINOPHIL NFR BLD: 0.3 % (ref 0–8)
ERYTHROCYTE [DISTWIDTH] IN BLOOD BY AUTOMATED COUNT: 12.4 % (ref 11.5–14.5)
ERYTHROCYTE [DISTWIDTH] IN BLOOD BY AUTOMATED COUNT: 12.4 % (ref 11.5–14.5)
EST. GFR  (NO RACE VARIABLE): >60 ML/MIN/1.73 M^2
GLUCOSE SERPL-MCNC: 106 MG/DL (ref 70–110)
GLUCOSE UR QL STRIP: ABNORMAL
HCT VFR BLD AUTO: 30.6 % (ref 37–48.5)
HCT VFR BLD AUTO: 41.9 % (ref 37–48.5)
HGB BLD-MCNC: 12.9 G/DL (ref 12–16)
HGB BLD-MCNC: 9.5 G/DL (ref 12–16)
HGB UR QL STRIP: NEGATIVE
IMM GRANULOCYTES # BLD AUTO: 0.04 K/UL (ref 0–0.04)
IMM GRANULOCYTES # BLD AUTO: 0.05 K/UL (ref 0–0.04)
IMM GRANULOCYTES NFR BLD AUTO: 0.4 % (ref 0–0.5)
IMM GRANULOCYTES NFR BLD AUTO: 0.6 % (ref 0–0.5)
INR PPP: 4 (ref 0.8–1.2)
KETONES UR QL STRIP: NEGATIVE
LDH SERPL L TO P-CCNC: 1.64 MMOL/L (ref 0.5–2.2)
LEUKOCYTE ESTERASE UR QL STRIP: NEGATIVE
LYMPHOCYTES # BLD AUTO: 1.1 K/UL (ref 1–4.8)
LYMPHOCYTES # BLD AUTO: 1.7 K/UL (ref 1–4.8)
LYMPHOCYTES NFR BLD: 12 % (ref 18–48)
LYMPHOCYTES NFR BLD: 22.5 % (ref 18–48)
MCH RBC QN AUTO: 29.5 PG (ref 27–31)
MCH RBC QN AUTO: 29.7 PG (ref 27–31)
MCHC RBC AUTO-ENTMCNC: 30.8 G/DL (ref 32–36)
MCHC RBC AUTO-ENTMCNC: 31 G/DL (ref 32–36)
MCV RBC AUTO: 95 FL (ref 82–98)
MCV RBC AUTO: 96 FL (ref 82–98)
MICROSCOPIC COMMENT: NORMAL
MONOCYTES # BLD AUTO: 0.6 K/UL (ref 0.3–1)
MONOCYTES # BLD AUTO: 0.6 K/UL (ref 0.3–1)
MONOCYTES NFR BLD: 5.9 % (ref 4–15)
MONOCYTES NFR BLD: 8 % (ref 4–15)
NEUTROPHILS # BLD AUTO: 5.3 K/UL (ref 1.8–7.7)
NEUTROPHILS # BLD AUTO: 7.6 K/UL (ref 1.8–7.7)
NEUTROPHILS NFR BLD: 68.2 % (ref 38–73)
NEUTROPHILS NFR BLD: 81.3 % (ref 38–73)
NITRITE UR QL STRIP: NEGATIVE
NRBC BLD-RTO: 0 /100 WBC
NRBC BLD-RTO: 0 /100 WBC
OHS QRS DURATION: 66 MS
OHS QTC CALCULATION: 438 MS
PH UR STRIP: 5 [PH] (ref 5–8)
PLATELET # BLD AUTO: 101 K/UL (ref 150–450)
PLATELET # BLD AUTO: 129 K/UL (ref 150–450)
PMV BLD AUTO: 11.1 FL (ref 9.2–12.9)
PMV BLD AUTO: 11.4 FL (ref 9.2–12.9)
POTASSIUM SERPL-SCNC: 4.4 MMOL/L (ref 3.5–5.1)
PROT SERPL-MCNC: 6.8 G/DL (ref 6–8.4)
PROT UR QL STRIP: ABNORMAL
PROTHROMBIN TIME: 40 SEC (ref 9–12.5)
RBC # BLD AUTO: 3.22 M/UL (ref 4–5.4)
RBC # BLD AUTO: 4.35 M/UL (ref 4–5.4)
SAMPLE: NORMAL
SITE: NORMAL
SODIUM SERPL-SCNC: 138 MMOL/L (ref 136–145)
SP GR UR STRIP: 1.02 (ref 1–1.03)
URN SPEC COLLECT METH UR: ABNORMAL
WBC # BLD AUTO: 7.73 K/UL (ref 3.9–12.7)
WBC # BLD AUTO: 9.36 K/UL (ref 3.9–12.7)
YEAST UR QL AUTO: NORMAL

## 2024-12-09 PROCEDURE — 81001 URINALYSIS AUTO W/SCOPE: CPT | Performed by: EMERGENCY MEDICINE

## 2024-12-09 PROCEDURE — 25000003 PHARM REV CODE 250: Performed by: STUDENT IN AN ORGANIZED HEALTH CARE EDUCATION/TRAINING PROGRAM

## 2024-12-09 PROCEDURE — 83605 ASSAY OF LACTIC ACID: CPT

## 2024-12-09 PROCEDURE — 80053 COMPREHEN METABOLIC PANEL: CPT | Performed by: STUDENT IN AN ORGANIZED HEALTH CARE EDUCATION/TRAINING PROGRAM

## 2024-12-09 PROCEDURE — 25500020 PHARM REV CODE 255: Performed by: STUDENT IN AN ORGANIZED HEALTH CARE EDUCATION/TRAINING PROGRAM

## 2024-12-09 PROCEDURE — 85025 COMPLETE CBC W/AUTO DIFF WBC: CPT | Performed by: EMERGENCY MEDICINE

## 2024-12-09 PROCEDURE — 99900035 HC TECH TIME PER 15 MIN (STAT)

## 2024-12-09 PROCEDURE — 93010 ELECTROCARDIOGRAM REPORT: CPT | Mod: ,,, | Performed by: INTERNAL MEDICINE

## 2024-12-09 PROCEDURE — 99285 EMERGENCY DEPT VISIT HI MDM: CPT | Mod: 25

## 2024-12-09 PROCEDURE — 85730 THROMBOPLASTIN TIME PARTIAL: CPT | Performed by: STUDENT IN AN ORGANIZED HEALTH CARE EDUCATION/TRAINING PROGRAM

## 2024-12-09 PROCEDURE — 96374 THER/PROPH/DIAG INJ IV PUSH: CPT

## 2024-12-09 PROCEDURE — 85025 COMPLETE CBC W/AUTO DIFF WBC: CPT | Mod: 91 | Performed by: STUDENT IN AN ORGANIZED HEALTH CARE EDUCATION/TRAINING PROGRAM

## 2024-12-09 PROCEDURE — 87040 BLOOD CULTURE FOR BACTERIA: CPT | Mod: 59 | Performed by: EMERGENCY MEDICINE

## 2024-12-09 PROCEDURE — 85610 PROTHROMBIN TIME: CPT | Performed by: STUDENT IN AN ORGANIZED HEALTH CARE EDUCATION/TRAINING PROGRAM

## 2024-12-09 PROCEDURE — 99202 OFFICE O/P NEW SF 15 MIN: CPT | Mod: S$GLB,,, | Performed by: NURSE PRACTITIONER

## 2024-12-09 PROCEDURE — 63600175 PHARM REV CODE 636 W HCPCS: Mod: JZ,JG | Performed by: STUDENT IN AN ORGANIZED HEALTH CARE EDUCATION/TRAINING PROGRAM

## 2024-12-09 PROCEDURE — 12000002 HC ACUTE/MED SURGE SEMI-PRIVATE ROOM

## 2024-12-09 PROCEDURE — 93005 ELECTROCARDIOGRAM TRACING: CPT

## 2024-12-09 PROCEDURE — 99999 PR PBB SHADOW E&M-EST. PATIENT-LVL III: CPT | Mod: PBBFAC,,, | Performed by: NURSE PRACTITIONER

## 2024-12-09 RX ORDER — ACETAMINOPHEN 325 MG/1
650 TABLET ORAL
Status: COMPLETED | OUTPATIENT
Start: 2024-12-09 | End: 2024-12-09

## 2024-12-09 RX ORDER — TALC
6 POWDER (GRAM) TOPICAL NIGHTLY PRN
Status: DISCONTINUED | OUTPATIENT
Start: 2024-12-10 | End: 2024-12-14 | Stop reason: HOSPADM

## 2024-12-09 RX ORDER — PROCHLORPERAZINE EDISYLATE 5 MG/ML
5 INJECTION INTRAMUSCULAR; INTRAVENOUS EVERY 6 HOURS PRN
Status: DISCONTINUED | OUTPATIENT
Start: 2024-12-10 | End: 2024-12-14 | Stop reason: HOSPADM

## 2024-12-09 RX ORDER — IBUPROFEN 200 MG
16 TABLET ORAL
Status: DISCONTINUED | OUTPATIENT
Start: 2024-12-10 | End: 2024-12-14 | Stop reason: HOSPADM

## 2024-12-09 RX ORDER — GLUCAGON 1 MG
1 KIT INJECTION
Status: DISCONTINUED | OUTPATIENT
Start: 2024-12-10 | End: 2024-12-14 | Stop reason: HOSPADM

## 2024-12-09 RX ORDER — ONDANSETRON HYDROCHLORIDE 2 MG/ML
4 INJECTION, SOLUTION INTRAVENOUS EVERY 8 HOURS PRN
Status: DISCONTINUED | OUTPATIENT
Start: 2024-12-10 | End: 2024-12-14 | Stop reason: HOSPADM

## 2024-12-09 RX ORDER — IBUPROFEN 200 MG
24 TABLET ORAL
Status: DISCONTINUED | OUTPATIENT
Start: 2024-12-10 | End: 2024-12-14 | Stop reason: HOSPADM

## 2024-12-09 RX ORDER — IBUPROFEN 600 MG/1
600 TABLET ORAL
Status: COMPLETED | OUTPATIENT
Start: 2024-12-09 | End: 2024-12-09

## 2024-12-09 RX ORDER — ACETAMINOPHEN 325 MG/1
650 TABLET ORAL EVERY 4 HOURS PRN
Status: DISCONTINUED | OUTPATIENT
Start: 2024-12-10 | End: 2024-12-14 | Stop reason: HOSPADM

## 2024-12-09 RX ORDER — SULFAMETHOXAZOLE AND TRIMETHOPRIM 800; 160 MG/1; MG/1
1 TABLET ORAL
Status: COMPLETED | OUTPATIENT
Start: 2024-12-09 | End: 2024-12-09

## 2024-12-09 RX ORDER — SODIUM CHLORIDE 0.9 % (FLUSH) 0.9 %
10 SYRINGE (ML) INJECTION
Status: DISCONTINUED | OUTPATIENT
Start: 2024-12-10 | End: 2024-12-14 | Stop reason: HOSPADM

## 2024-12-09 RX ORDER — MORPHINE SULFATE 4 MG/ML
4 INJECTION, SOLUTION INTRAMUSCULAR; INTRAVENOUS
Status: COMPLETED | OUTPATIENT
Start: 2024-12-09 | End: 2024-12-09

## 2024-12-09 RX ORDER — CLINDAMYCIN PHOSPHATE 600 MG/50ML
600 INJECTION, SOLUTION INTRAVENOUS ONCE
Status: COMPLETED | OUTPATIENT
Start: 2024-12-09 | End: 2024-12-09

## 2024-12-09 RX ORDER — OXYCODONE HYDROCHLORIDE 5 MG/1
5 TABLET ORAL
Status: COMPLETED | OUTPATIENT
Start: 2024-12-09 | End: 2024-12-09

## 2024-12-09 RX ORDER — NALOXONE HCL 0.4 MG/ML
0.02 VIAL (ML) INJECTION
Status: DISCONTINUED | OUTPATIENT
Start: 2024-12-10 | End: 2024-12-14 | Stop reason: HOSPADM

## 2024-12-09 RX ORDER — HEPARIN SODIUM,PORCINE/D5W 25000/250
0-40 INTRAVENOUS SOLUTION INTRAVENOUS CONTINUOUS
Status: DISCONTINUED | OUTPATIENT
Start: 2024-12-09 | End: 2024-12-13

## 2024-12-09 RX ADMIN — MORPHINE SULFATE 4 MG: 4 INJECTION INTRAVENOUS at 07:12

## 2024-12-09 RX ADMIN — ACETAMINOPHEN 650 MG: 325 TABLET ORAL at 02:12

## 2024-12-09 RX ADMIN — SODIUM CHLORIDE 1000 ML: 0.9 INJECTION, SOLUTION INTRAVENOUS at 04:12

## 2024-12-09 RX ADMIN — IBUPROFEN 600 MG: 600 TABLET, FILM COATED ORAL at 07:12

## 2024-12-09 RX ADMIN — HEPARIN SODIUM 18 UNITS/KG/HR: 10000 INJECTION, SOLUTION INTRAVENOUS at 08:12

## 2024-12-09 RX ADMIN — IOHEXOL 100 ML: 350 INJECTION, SOLUTION INTRAVENOUS at 03:12

## 2024-12-09 RX ADMIN — CLINDAMYCIN IN 5 PERCENT DEXTROSE 600 MG: 12 INJECTION, SOLUTION INTRAVENOUS at 08:12

## 2024-12-09 RX ADMIN — SULFAMETHOXAZOLE AND TRIMETHOPRIM 1 TABLET: 800; 160 TABLET ORAL at 05:12

## 2024-12-09 RX ADMIN — OXYCODONE 5 MG: 5 TABLET ORAL at 02:12

## 2024-12-09 NOTE — HPI
Deferred- patient sent to ED for infection:   Patient arrived to clinic room with complaint of bleeding from oval shaped wound approx. 3/4 inch in. The calf was red, swollen erythematous and tender to touch. Blood was cleaned from her leg and lesion was covered. MA reported abnormal vital signs T 101.7, P 129, /66, Pulse Ox 99%; patient also began to report having chills/ Discussed concerns with patient regarding the possibility of serious infection to her right leg and the need for emergency care. She was taken to the ED per W/C escorted by clinic nurse MIGUEL Martinez. Dr. Ramirez, ny MD and clinic manager notified of event.

## 2024-12-09 NOTE — OUTPATIENT SUBJECTIVE & OBJECTIVE
"Outpatient Subjective & Objective      Chief Complaint: patient present to clinic with infection and was transferred to the ED for care  ROS and PE deferred    Past Medical History:   Diagnosis Date    Hepatitis C antibody positive in blood 10/14/2024    RNA POSITIVE         No past medical history pertinent negatives.      Past Surgical History:   Procedure Laterality Date    CHOLECYSTECTOMY      EPIDURAL STEROID INJECTION INTO LUMBAR SPINE N/A 6/6/2024    Procedure: Injection-steroid-epidural-lumbar;  Surgeon: Kenn Miramontes MD;  Location[facility];  Service: Anesthesiology;  Laterality: N/A;    TRANSFORAMINAL EPIDURAL INJECTION OF STEROID Right 7/11/2024    Procedure: Injection,steroid,epidural,transforaminal approach;  Surgeon: Kenn Miramontes MD;  Location: [facility];  Service: Pain Management;  Laterality: Right;       Review of Systems       VITALS  Visit Vitals  BP (!) 148/66 (BP Location: Left arm, Patient Position: Sitting)   Pulse (!) 129   Temp (!) 101.7 °F (38.7 °C) (Oral)   Ht 5' 5" (1.651 m)   Wt 113 kg (249 lb 1.9 oz)   SpO2 99%   BMI 41.46 kg/m²          Physical Exam     Significant Labs:  Lab Results   Component Value Date    WBC 4.26 10/14/2024    HGB 12.4 10/14/2024    HCT 39.1 10/14/2024    PLT 76 (L) 10/14/2024    CHOL 167 10/01/2019    TRIG 199 (H) 10/01/2019    HDL 65 10/01/2019    ALT 68 (H) 10/14/2024    AST 90 (H) 10/14/2024     10/14/2024    K 3.3 (L) 10/14/2024     10/14/2024    CREATININE 0.7 10/14/2024    BUN 7 10/14/2024    CO2 26 10/14/2024    TSH 2.183 10/01/2019    HGBA1C 5.5 10/01/2019       Diagnostic Studies: No relevant studies.    EKG:   Results for orders placed or performed during the hospital encounter of 10/14/24   EKG 12-lead    Collection Time: 10/14/24 12:00 AM    Narrative    Ordered by an unspecified provider.       2D ECHO:  TTE:  No results found for this or any previous visit.    PHIL:  No results found for this or any previous visit.                 "

## 2024-12-09 NOTE — TELEPHONE ENCOUNTER
Called pt regarding rescheduling surgery. Unfortunately  is out of office today we will call to discuss new surgery date. I was unable to llvm.      Evelia Quiñones MA  [facility]  Neurosurgery.

## 2024-12-09 NOTE — ED PROVIDER NOTES
Source of History  patient and EMR    Chief Complaint    Wound Check (Wound to right leg states had fever today supposed to have back surgery but got sent down here for possible infection)      History of Present Illness    Selwyn Crowder is a 54 y.o. female presenting with right posterior knee wound, has been there for a few days, now draining.  Was getting preop for surgery (on her back) when they sent her here for evaluation.  No fevers.  Some erythema to the area.  Not sure what started the wound.  Was febrile on arrival to preop earlier today.    Review of Systems    As per HPI and below:  Constitutional symptoms:  No weakness  Skin symptoms:  erythema and draining wound  Cardiovascular symptoms:  No chest pain  Musculoskeletal symptoms:  + RLE swelling, redness and knee pain and swelling with open wound in the posterior aprt of the knee   Neurologic symptoms:  No tingling      Past History    As per HPI and below:  Past Medical History:   Diagnosis Date    Hepatitis C antibody positive in blood 10/14/2024    RNA POSITIVE       Past Surgical History:   Procedure Laterality Date    CHOLECYSTECTOMY      EPIDURAL STEROID INJECTION INTO LUMBAR SPINE N/A 6/6/2024    Procedure: Injection-steroid-epidural-lumbar;  Surgeon: Kenn Miramontes MD;  Location: [facility] OR;  Service: Anesthesiology;  Laterality: N/A;    TRANSFORAMINAL EPIDURAL INJECTION OF STEROID Right 7/11/2024    Procedure: Injection,steroid,epidural,transforaminal approach;  Surgeon: Kenn Miramontes MD;  Location: [facility] OR;  Service: Pain Management;  Laterality: Right;       Social History     Socioeconomic History    Marital status:    Tobacco Use    Smoking status: Never    Smokeless tobacco: Never   Substance and Sexual Activity    Alcohol use: Yes    Drug use: Never    Sexual activity: Not Currently     Social Drivers of Health     Financial Resource Strain: Medium Risk (12/6/2024)    Overall Financial Resource Strain (CARDIA)     Difficulty  of Paying Living Expenses: Somewhat hard   Food Insecurity: Food Insecurity Present (12/6/2024)    Hunger Vital Sign     Worried About Running Out of Food in the Last Year: Sometimes true     Ran Out of Food in the Last Year: Never true   Physical Activity: Insufficiently Active (12/6/2024)    Exercise Vital Sign     Days of Exercise per Week: 2 days     Minutes of Exercise per Session: 30 min   Stress: Stress Concern Present (12/6/2024)    [employer] [place] [employer] - Occupational Stress Questionnaire     Feeling of Stress : Rather much   Housing Stability: Unknown (12/6/2024)    Housing Stability Vital Sign     Unable to Pay for Housing in the Last Year: Patient declined       No family history on file.    Review of patient's allergies indicates:  No Known Allergies    No current facility-administered medications on file prior to encounter.     Current Outpatient Medications on File Prior to Encounter   Medication Sig Dispense Refill    amLODIPine (NORVASC) 5 MG tablet Take 1 tablet (5 mg total) by mouth daily as needed (blood pressure greater than 160/100). 30 tablet 0    benzonatate (TESSALON) 100 MG capsule Take 1 capsule (100 mg total) by mouth 3 (three) times daily as needed for Cough. 20 capsule 0    diclofenac (VOLTAREN) 50 MG EC tablet Take 1 tablet (50 mg total) by mouth 3 (three) times daily as needed (Pain). 60 tablet 0    gabapentin (NEURONTIN) 400 MG capsule Take 1 capsule (400 mg total) by mouth 3 (three) times daily. Slowly titrate dose. Start 1 pill nightly x 2 days, then one pill morning and night for the following 2 days, then one pill 3x daily 90 capsule 2    methocarbamoL (ROBAXIN) 500 MG Tab Take 1-2 tablets 3 times a day as needed for back pain/spasms 60 tablet 0    promethazine-dextromethorphan (PROMETHAZINE-DM) 6.25-15 mg/5 mL Syrp Take 5 mLs by mouth every 4 (four) hours as needed (cough). 118 mL 0    traMADoL (ULTRAM) 50 mg tablet Take 1 tablet (50 mg total) by mouth every 6  "(six) hours as needed for Pain. 28 tablet 0       Physical Exam    Reviewed nursing notes.  Vitals:    12/09/24 1132 12/09/24 1241 12/09/24 1424   BP: (!) 184/84     Pulse: (!) 114     Resp: 20  18   Temp: 99.5 °F (37.5 °C)     TempSrc: Oral     SpO2: 97%     Weight:  113 kg (249 lb 1.9 oz)    Height:  5' 5" (1.651 m)      General:  Alert, no acute distress.    Skin:  Warm, dry, intact.  No rash.  Head:  Normocephalic, atraumatic.    Neck:  Supple.   HEENT:  Pupils are equal and round, appropriate for room, extraocular movements are intact.  Normal phonation.  Moist mucous membranes.  Cardiovascular:  Regular rate and rhythm, Normal peripheral perfusion, + bl LE edema  Respiratory:  Respirations are nonlabored  Gastrointestinal:  Non distended.   Musculoskeletal:  Normal range of motion observed.  Some swelling, with erythema and induration of the right lower extremity, with serosanguineous drainage from an open wound about 2cm of the posterior right knee.  Neurological:  Alert and oriented to person, place, time, and situation.  No focal deficits observed.   Psychiatric:  Cooperative, appropriate mood & affect.       Initial MDM and Data Review    54 y.o. female presenting for evaluation of knee wound, right posterior knee, febrile at preop    Bedside ultrasound team states no fluid collection    Differential includes but is not limited to: cellulitis, deep space infection, DVT, sepsis (Tachycardia, febrile earlier)    Work-up includes:  X-ray of the knee, CT of the knee, DVT ultrasound    Interventions include:  Oral antibiotics for now, analgesia / antipyretics  Pt had nml lactate  Sepsis work up initiated but suspect some of tachycardia may be related to fever/pain, will treat both, reassess     The patient has significant medical comorbidities that influence decision making for this acute process, such as:  Spinal radiculopathy history    I decided to obtain the patient's medical records and review relevant " documentation from hospital records.  Pertinent information is noted.  Was for preop earlier today    Medications   sulfamethoxazole-trimethoprim 800-160mg per tablet 1 tablet (has no administration in time range)   oxyCODONE immediate release tablet 5 mg (5 mg Oral Given 12/9/24 1424)   acetaminophen tablet 650 mg (650 mg Oral Given 12/9/24 1424)   iohexoL (OMNIPAQUE 350) injection 100 mL (100 mLs Intravenous Given 12/9/24 1520)       Results and ED Course    Labs Reviewed   CBC W/ AUTO DIFFERENTIAL - Abnormal       Result Value    WBC 9.36      RBC 4.35      Hemoglobin 12.9      Hematocrit 41.9      MCV 96      MCH 29.7      MCHC 30.8 (*)     RDW 12.4      Platelets 129 (*)     MPV 11.4      Immature Granulocytes 0.4      Gran # (ANC) 7.6      Immature Grans (Abs) 0.04      Lymph # 1.1      Mono # 0.6      Eos # 0.0      Baso # 0.02      nRBC 0      Gran % 81.3 (*)     Lymph % 12.0 (*)     Mono % 5.9      Eosinophil % 0.2      Basophil % 0.2      Differential Method Automated     URINALYSIS, REFLEX TO URINE CULTURE - Abnormal    Specimen UA Urine, Clean Catch      Color, UA Yellow      Appearance, UA Clear      pH, UA 5.0      Specific Gravity, UA 1.020      Protein, UA Trace (*)     Glucose, UA 4+ (*)     Ketones, UA Negative      Bilirubin (UA) Negative      Occult Blood UA Negative      Nitrite, UA Negative      Leukocytes, UA Negative      Narrative:     Specimen Source->Urine   COMPREHENSIVE METABOLIC PANEL - Abnormal    Sodium 138      Potassium 4.4      Chloride 107      CO2 23      Glucose 106      BUN 14      Creatinine 0.8      Calcium 8.1 (*)     Total Protein 6.8      Albumin 2.8 (*)     Total Bilirubin 0.9      Alkaline Phosphatase 93      AST 56 (*)     ALT 48 (*)     eGFR >60.0      Anion Gap 8     CULTURE, BLOOD   CULTURE, BLOOD   URINALYSIS MICROSCOPIC    Bacteria Rare      Yeast, UA None      Microscopic Comment SEE COMMENT      Narrative:     Specimen Source->Urine   ISTAT LACTATE    POC Lactate  1.64      Sample VENOUS      Site Other      Allens Test N/A         Imaging Results              CT Knee With Contrast Right (In process)                      X-Ray Knee 3 View Right (Final result)  Result time 12/09/24 15:01:55      Final result by Gabriel Bravo MD (12/09/24 15:01:55)                   Impression:      See above      Electronically signed by: Gabriel Bravo MD  Date:    12/09/2024  Time:    15:01               Narrative:    EXAMINATION:  XR KNEE 3 VIEW RIGHT    CLINICAL HISTORY:  Local infection of the skin and subcutaneous tissue, unspecified    TECHNIQUE:  AP, lateral, and Merchant views of the right knee were performed.    COMPARISON:  09/22/2023    FINDINGS:  Joint space is mildly narrowed medially with some bony spurring and degenerative change.  No joint effusion is seen.                                       X-Ray Chest AP Portable (Final result)  Result time 12/09/24 14:03:05      Final result by Gabriel Bravo MD (12/09/24 14:03:05)                   Impression:      See above      Electronically signed by: Gabriel Bravo MD  Date:    12/09/2024  Time:    14:03               Narrative:    EXAMINATION:  XR CHEST AP PORTABLE    CLINICAL HISTORY:  Sepsis;    TECHNIQUE:  Single frontal view of the chest was performed.    COMPARISON:  10/14/2024    FINDINGS:  Cardiac size remains normal lungs are clear and well aerated without infiltrate.                                      ED Course as of 12/09/24 1536   Mon Dec 09, 2024   1335 WBC: 9.36 [AC]   1335 Glucose, UA(!): 4+ [AC]   1335 POC Lactate: 1.64 [AC]      ED Course User Index  [AC] Ruslan Peralta DO               EKG interpreted by myself    EKG  Performed: 12/09/2024   Rate/Rhythm/Axis:  Sinus tachycardia rate of 120  QRS 66 ms  Qtc 438 ms  Impression:  Motion artifact is present, but from what I can evaluate, there is no acute ischemia and no interval abnormality would recommend repeat      Relevant imaging  interpreted by myself  Arthritis, and degenerative changes, otherwise no acute findings    Impression and Plan    54 y.o. female with findings of cellulitis with draining wound based on the work up in the emergency department as above.    Important lab/imaging findings include: reviewed thus far, nml lactate, no leukocytosis  Awaiting CT, DVT US    All tests, treatment options and disposition options were discussed with the patient.  The decision was made to observe the patient in the ED while awaiting further work up.    The patient was signed out to oncoming attending in stable condition and all further questions/concerns by patient and/or family were addressed.               Final diagnoses:  [R00.0] Tachycardia  [R60.0] Leg edema  [L08.9] Soft tissue infection                 Ruslan Peralta, DO  12/09/24 2296

## 2024-12-09 NOTE — ASSESSMENT & PLAN NOTE
Patient arrived to clinic room with complaint of bleeding from oval shaped wound approx. 3/4 inch in. The calf was red, swollen erythematous and tender to touch. Blood was cleaned from her leg and lesion was covered. MA reported abnormal vital signs T 101.7, P 129, /66, Pulse Ox 99%; patient also began to report having chills/ Discussed concerns with patient regarding the possibility of serious infection to her right leg and the need for emergency care. She was taken to the ED per W/C escorted by clinic nurse MIGUEL Martinez. Dr. Ramirez, ny MD and clinic manager notified of event.

## 2024-12-09 NOTE — ED TRIAGE NOTES
Pt. Is a 54 yr old -American female presenting to the ED with a wound on the lower aspect of the posterior right thigh.  Pt. Febrile, and was sent to ER to be checked for infection.

## 2024-12-09 NOTE — FIRST PROVIDER EVALUATION
Medical screening examination initiated.  I have conducted a focused provider triage encounter, findings are as follows:    Brief history of present illness:  53yo F sent in from preop for leg swelling, redness, concern for cellulitis with fever 101.6 and tachycardia. Was supposed to have spinal fusion in 2d. +chills.     Vitals:    12/09/24 1132   BP: (!) 184/84   Pulse: (!) 114   Resp: 20   Temp: 99.5 °F (37.5 °C)   TempSrc: Oral   SpO2: 97%       Pertinent physical exam:  NAD, +R>L leg swelling    Brief workup plan:  sepsis w/u    Preliminary workup initiated; this workup will be continued and followed by the physician or advanced practice provider that is assigned to the patient when roomed.

## 2024-12-09 NOTE — PROGRESS NOTES
I received this patient in changeover.  Patient is a 54-year-old female sent in from preop for a right posterior knee wound.  She noticed it a few days ago.  It has been draining.  Associated with swelling and redness.  She is unsure what started the wound.  She was febrile and tachycardic in preop today as she is scheduled for surgery later this week on her back.  She had an infectious workup ordered but suspect that her fever is due to her leg wound.    No pneumonia on chest x-ray.  UA without evidence of infection.  Knee x-ray without any acute findings.  Lab work reviewed.  No leukocytosis.  Normal lactate.  CT of the affected area on her leg still pending as well as ultrasound to rule out DVT at time of changeover.  Plan to re-evaluate/ambulate and follow up imaging.  Patient treated with oral Bactrim here.  Suspect discharge home patient remains clinically well-appearing.  She will need Bactrim Rx at discharge and will need to contact her surgeon as they may want to postpone her surgery.    EKG with sinus tachycardia, rate 115, no STEMI    Called by Radiology.  Patient does have extensive occlusive thrombus throughout her right lower extremity.  This could be was causing her to have the wounds/skin changes.  Will go ahead and start the patient on a heparin drip.  She is already being treated for the wounds with Bactrim.  Will go ahead and give dose of IV clinda.  On re-evaluation, patient remains febrile with mild tachycardia likely due to her fever.  Additional antipyretics ordered.  More pain control also ordered.  Patient is neurovascularly intact distally.  Compartments soft.  Plan to admit to Hospital Medicine.    US Impression:     Right lower extremity occlusive thrombus involving the popliteal vein, both posterior tibial veins, both anterior tibial veins, and both peroneal veins.       Imaging Results              US Lower Extremity Veins Right (In process)  Result time 12/09/24 18:32:26                       CT Knee With Contrast Right (Final result)  Result time 12/09/24 16:29:10      Final result by Isaiah Goodwin MD (12/09/24 16:29:10)                   Impression:      Skin irregularity along the posterolateral aspect of the lower thigh and popliteal fossa, compatible with reported wound in this region.  Tubular area of soft tissue attenuation within the underlying subcutaneous fat with mild thickening of the adjacent deep superficial fascia, possibly an inflammatory or infectious tract.  No abscess.  No osteomyelitis.  No foci of gas to suggest necrotizing fascitis.    Skin thickening and diffuse subcutaneous edema throughout the lower leg and distal thigh, nonspecific, may represent cellulitis in the appropriate clinical context.    This report was flagged in Epic as abnormal.    Electronically signed by resident: Reyes To  Date:    12/09/2024  Time:    15:41    Electronically signed by: Isaiah Goodwin MD  Date:    12/09/2024  Time:    16:29               Narrative:    EXAMINATION:  CT KNEE WITH CONTRAST RIGHT    CLINICAL HISTORY:  Soft tissue infection suspected, knee, xray done;    TECHNIQUE:  Axial images of the right knee obtained after the administration of 100 mL Omnipaque 350 intravenous contrast.  Data submitted for coronal and sagittal reformats.    COMPARISON:  Radiographs same date.    FINDINGS:  Skin irregularity along the posterolateral aspect of the lower thigh/knee.  Ill-defined soft tissue attenuation tract within the underlying subcutaneous soft tissues that extends to the level of the deep peripheral fascia (for example, series 302, images 134 and 123).  No organized subcutaneous fluid collections.  There is diffuse skin thickening and subcutaneous edema throughout the visualized lower leg and distal thigh.  Numerous varicose veins noted.    There is normal bony mineralization.  No fracture osseous lesions.  There is tricompartmental degenerative change including subchondral cystic  change and osteophyte production.    The muscle bulk and attenuation is maintained.  Regional tendons are intact.    Neurovascular structures appear within normal limits.                                       X-Ray Knee 3 View Right (Final result)  Result time 12/09/24 15:01:55      Final result by Gabriel Bravo MD (12/09/24 15:01:55)                   Impression:      See above      Electronically signed by: Gabriel Bravo MD  Date:    12/09/2024  Time:    15:01               Narrative:    EXAMINATION:  XR KNEE 3 VIEW RIGHT    CLINICAL HISTORY:  Local infection of the skin and subcutaneous tissue, unspecified    TECHNIQUE:  AP, lateral, and Merchant views of the right knee were performed.    COMPARISON:  09/22/2023    FINDINGS:  Joint space is mildly narrowed medially with some bony spurring and degenerative change.  No joint effusion is seen.                                       X-Ray Chest AP Portable (Final result)  Result time 12/09/24 14:03:05      Final result by Gabriel Bravo MD (12/09/24 14:03:05)                   Impression:      See above      Electronically signed by: Gabriel Bravo MD  Date:    12/09/2024  Time:    14:03               Narrative:    EXAMINATION:  XR CHEST AP PORTABLE    CLINICAL HISTORY:  Sepsis;    TECHNIQUE:  Single frontal view of the chest was performed.    COMPARISON:  10/14/2024    FINDINGS:  Cardiac size remains normal lungs are clear and well aerated without infiltrate.

## 2024-12-09 NOTE — Clinical Note
Surgery will need to be delayed Patient arrived to clinic room with complaint of bleeding from  oval shaped wound approx. 3/4 inch in. The calf was red, swollen erythematous and tender to touch.   Blood was cleaned from her leg and lesion was covered.  MA reported abnormal vital signs T 101.7, P 129, /66, Pulse Ox 99%; patient also began to report having chills/  Discussed concerns with patient regarding the possibility of serious infection to her right leg and the need for emergency care.  She was taken to the ED per W/C escorted by clinic nurse MIGUEL Martinez.

## 2024-12-09 NOTE — PROGRESS NOTES
[facility] [address]  Progress Note    Patient Name: Selwyn Crowder  MRN: [id number]  Date of Evaluation- 12/09/2024  PCP- No, Primary Doctor    Patient arrived to clinic room with complaint of bleeding from  oval shaped wound approx. 3/4 inch in. The calf was red, swollen erythematous and tender to touch.   Blood was cleaned from her leg and lesion was covered.  MA reported abnormal vital signs T 101.7, P 129, /66, Pulse Ox 99%; patient also began to report having chills/  Discussed concerns with patient regarding the possibility of serious infection to her right leg and the need for emergency care.  She was taken to the ED per W/C escorted by clinic nurse MIGUEL Martinez.  Dr. Ramirez, ny MD and clinic manager notified of event.    I spent a total of 27 minutes on the day of the visit.  This includes face to face time and non-face to face time preparing to see the patient (eg, review of tests), obtaining and/or reviewing separately obtained history, documenting clinical information in the electronic or other health record, independently interpreting results and communicating results to the patient/family/caregiver, or care coordinator.    Patient is not optimized due to infection  Referred to Ed for emergency care      Alfonzo Lucas NP  Perioperative Medicine  [facility]   Pager [phone]

## 2024-12-10 PROBLEM — E87.1 HYPONATREMIA: Status: ACTIVE | Noted: 2024-12-10

## 2024-12-10 PROBLEM — D64.9 ANEMIA: Status: ACTIVE | Noted: 2024-12-10

## 2024-12-10 PROBLEM — R53.81 DEBILITY: Status: ACTIVE | Noted: 2024-12-10

## 2024-12-10 PROBLEM — E66.01 MORBID OBESITY: Status: ACTIVE | Noted: 2024-12-10

## 2024-12-10 PROBLEM — R79.89 ELEVATED BRAIN NATRIURETIC PEPTIDE (BNP) LEVEL: Status: ACTIVE | Noted: 2024-12-10

## 2024-12-10 PROBLEM — B18.2 CHRONIC HEPATITIS C VIRUS INFECTION: Status: ACTIVE | Noted: 2024-12-10

## 2024-12-10 PROBLEM — A41.9 SEPSIS: Status: ACTIVE | Noted: 2024-12-10

## 2024-12-10 PROBLEM — D69.6 THROMBOCYTOPENIA: Status: ACTIVE | Noted: 2024-12-10

## 2024-12-10 LAB
ALBUMIN SERPL BCP-MCNC: 2.4 G/DL (ref 3.5–5.2)
ALP SERPL-CCNC: 97 U/L (ref 40–150)
ALT SERPL W/O P-5'-P-CCNC: 47 U/L (ref 10–44)
ANION GAP SERPL CALC-SCNC: 8 MMOL/L (ref 8–16)
APTT PPP: 131.2 SEC (ref 21–32)
APTT PPP: 30.3 SEC (ref 21–32)
APTT PPP: 50.8 SEC (ref 21–32)
AST SERPL-CCNC: 60 U/L (ref 10–40)
BASOPHILS # BLD AUTO: 0.02 K/UL (ref 0–0.2)
BASOPHILS NFR BLD: 0.3 % (ref 0–1.9)
BILIRUB SERPL-MCNC: 2.2 MG/DL (ref 0.1–1)
BNP SERPL-MCNC: 114 PG/ML (ref 0–99)
BUN SERPL-MCNC: 11 MG/DL (ref 6–20)
CALCIUM SERPL-MCNC: 7.7 MG/DL (ref 8.7–10.5)
CHLORIDE SERPL-SCNC: 106 MMOL/L (ref 95–110)
CO2 SERPL-SCNC: 20 MMOL/L (ref 23–29)
CREAT SERPL-MCNC: 0.8 MG/DL (ref 0.5–1.4)
DIFFERENTIAL METHOD BLD: ABNORMAL
EOSINOPHIL # BLD AUTO: 0 K/UL (ref 0–0.5)
EOSINOPHIL NFR BLD: 0.1 % (ref 0–8)
ERYTHROCYTE [DISTWIDTH] IN BLOOD BY AUTOMATED COUNT: 12.4 % (ref 11.5–14.5)
EST. GFR  (NO RACE VARIABLE): >60 ML/MIN/1.73 M^2
GLUCOSE SERPL-MCNC: 206 MG/DL (ref 70–110)
HCT VFR BLD AUTO: 34.5 % (ref 37–48.5)
HGB BLD-MCNC: 10.7 G/DL (ref 12–16)
IMM GRANULOCYTES # BLD AUTO: 0.03 K/UL (ref 0–0.04)
IMM GRANULOCYTES NFR BLD AUTO: 0.4 % (ref 0–0.5)
INR PPP: 1.2 (ref 0.8–1.2)
LYMPHOCYTES # BLD AUTO: 1.1 K/UL (ref 1–4.8)
LYMPHOCYTES NFR BLD: 15.3 % (ref 18–48)
MCH RBC QN AUTO: 29.3 PG (ref 27–31)
MCHC RBC AUTO-ENTMCNC: 31 G/DL (ref 32–36)
MCV RBC AUTO: 95 FL (ref 82–98)
MONOCYTES # BLD AUTO: 0.5 K/UL (ref 0.3–1)
MONOCYTES NFR BLD: 6.7 % (ref 4–15)
NEUTROPHILS # BLD AUTO: 5.4 K/UL (ref 1.8–7.7)
NEUTROPHILS NFR BLD: 77.2 % (ref 38–73)
NRBC BLD-RTO: 0 /100 WBC
OHS QRS DURATION: 70 MS
OHS QTC CALCULATION: 453 MS
PLATELET # BLD AUTO: 98 K/UL (ref 150–450)
PMV BLD AUTO: 10.2 FL (ref 9.2–12.9)
POTASSIUM SERPL-SCNC: 4.3 MMOL/L (ref 3.5–5.1)
PROT SERPL-MCNC: 6.2 G/DL (ref 6–8.4)
PROTHROMBIN TIME: 13 SEC (ref 9–12.5)
RBC # BLD AUTO: 3.65 M/UL (ref 4–5.4)
SODIUM SERPL-SCNC: 134 MMOL/L (ref 136–145)
TROPONIN I SERPL DL<=0.01 NG/ML-MCNC: 16 NG/L (ref 0–14)
WBC # BLD AUTO: 6.98 K/UL (ref 3.9–12.7)

## 2024-12-10 PROCEDURE — 25000003 PHARM REV CODE 250: Performed by: HOSPITALIST

## 2024-12-10 PROCEDURE — 97165 OT EVAL LOW COMPLEX 30 MIN: CPT

## 2024-12-10 PROCEDURE — 63600175 PHARM REV CODE 636 W HCPCS: Mod: JZ,JG | Performed by: HOSPITALIST

## 2024-12-10 PROCEDURE — 83880 ASSAY OF NATRIURETIC PEPTIDE: CPT | Performed by: HOSPITALIST

## 2024-12-10 PROCEDURE — 84484 ASSAY OF TROPONIN QUANT: CPT | Performed by: HOSPITALIST

## 2024-12-10 PROCEDURE — 85610 PROTHROMBIN TIME: CPT | Performed by: HOSPITALIST

## 2024-12-10 PROCEDURE — 85730 THROMBOPLASTIN TIME PARTIAL: CPT | Mod: 91 | Performed by: HOSPITALIST

## 2024-12-10 PROCEDURE — 63600175 PHARM REV CODE 636 W HCPCS: Performed by: STUDENT IN AN ORGANIZED HEALTH CARE EDUCATION/TRAINING PROGRAM

## 2024-12-10 PROCEDURE — 97161 PT EVAL LOW COMPLEX 20 MIN: CPT

## 2024-12-10 PROCEDURE — 85730 THROMBOPLASTIN TIME PARTIAL: CPT | Performed by: STUDENT IN AN ORGANIZED HEALTH CARE EDUCATION/TRAINING PROGRAM

## 2024-12-10 PROCEDURE — 80053 COMPREHEN METABOLIC PANEL: CPT | Performed by: HOSPITALIST

## 2024-12-10 PROCEDURE — 11000001 HC ACUTE MED/SURG PRIVATE ROOM

## 2024-12-10 PROCEDURE — 85025 COMPLETE CBC W/AUTO DIFF WBC: CPT | Performed by: HOSPITALIST

## 2024-12-10 PROCEDURE — 97116 GAIT TRAINING THERAPY: CPT

## 2024-12-10 PROCEDURE — 21400001 HC TELEMETRY ROOM

## 2024-12-10 PROCEDURE — 97530 THERAPEUTIC ACTIVITIES: CPT

## 2024-12-10 RX ORDER — DIPHENHYDRAMINE HCL 25 MG
50 CAPSULE ORAL EVERY 6 HOURS PRN
Status: DISCONTINUED | OUTPATIENT
Start: 2024-12-10 | End: 2024-12-14 | Stop reason: HOSPADM

## 2024-12-10 RX ORDER — CLINDAMYCIN PHOSPHATE 150 MG/ML
900 INJECTION, SOLUTION INTRAVENOUS
Status: DISCONTINUED | OUTPATIENT
Start: 2024-12-10 | End: 2024-12-10

## 2024-12-10 RX ORDER — METHOCARBAMOL 500 MG/1
500 TABLET, FILM COATED ORAL 3 TIMES DAILY
Status: DISCONTINUED | OUTPATIENT
Start: 2024-12-10 | End: 2024-12-14 | Stop reason: HOSPADM

## 2024-12-10 RX ORDER — CLINDAMYCIN PHOSPHATE 600 MG/50ML
600 INJECTION, SOLUTION INTRAVENOUS
Status: DISCONTINUED | OUTPATIENT
Start: 2024-12-10 | End: 2024-12-10

## 2024-12-10 RX ORDER — CLINDAMYCIN PHOSPHATE 900 MG/50ML
900 INJECTION, SOLUTION INTRAVENOUS
Status: DISCONTINUED | OUTPATIENT
Start: 2024-12-10 | End: 2024-12-11

## 2024-12-10 RX ORDER — SULFAMETHOXAZOLE AND TRIMETHOPRIM 800; 160 MG/1; MG/1
1 TABLET ORAL 2 TIMES DAILY
Status: DISCONTINUED | OUTPATIENT
Start: 2024-12-10 | End: 2024-12-10

## 2024-12-10 RX ORDER — GABAPENTIN 400 MG/1
400 CAPSULE ORAL 3 TIMES DAILY
Status: DISCONTINUED | OUTPATIENT
Start: 2024-12-10 | End: 2024-12-14 | Stop reason: HOSPADM

## 2024-12-10 RX ORDER — CEFTRIAXONE 2 G/1
2 INJECTION, POWDER, FOR SOLUTION INTRAMUSCULAR; INTRAVENOUS DAILY
Status: DISCONTINUED | OUTPATIENT
Start: 2024-12-10 | End: 2024-12-13

## 2024-12-10 RX ORDER — AMLODIPINE BESYLATE 5 MG/1
5 TABLET ORAL DAILY PRN
Status: DISCONTINUED | OUTPATIENT
Start: 2024-12-10 | End: 2024-12-10

## 2024-12-10 RX ORDER — DIPHENHYDRAMINE HCL 25 MG
25 CAPSULE ORAL EVERY 6 HOURS PRN
Status: DISCONTINUED | OUTPATIENT
Start: 2024-12-10 | End: 2024-12-10

## 2024-12-10 RX ORDER — HYDROCODONE BITARTRATE AND ACETAMINOPHEN 5; 325 MG/1; MG/1
1 TABLET ORAL EVERY 4 HOURS PRN
Status: DISCONTINUED | OUTPATIENT
Start: 2024-12-10 | End: 2024-12-13

## 2024-12-10 RX ADMIN — DIPHENHYDRAMINE HYDROCHLORIDE 50 MG: 25 CAPSULE ORAL at 09:12

## 2024-12-10 RX ADMIN — DIPHENHYDRAMINE HYDROCHLORIDE 25 MG: 25 CAPSULE ORAL at 03:12

## 2024-12-10 RX ADMIN — GABAPENTIN 400 MG: 300 CAPSULE ORAL at 08:12

## 2024-12-10 RX ADMIN — GABAPENTIN 400 MG: 300 CAPSULE ORAL at 04:12

## 2024-12-10 RX ADMIN — TACROLIMUS 900 MG: 0.5 CAPSULE ORAL at 05:12

## 2024-12-10 RX ADMIN — HEPARIN SODIUM 14 UNITS/KG/HR: 10000 INJECTION, SOLUTION INTRAVENOUS at 02:12

## 2024-12-10 RX ADMIN — HYDROCODONE BITARTRATE AND ACETAMINOPHEN 1 TABLET: 5; 325 TABLET ORAL at 01:12

## 2024-12-10 RX ADMIN — CEFTRIAXONE 2 G: 2 INJECTION, POWDER, FOR SOLUTION INTRAMUSCULAR; INTRAVENOUS at 04:12

## 2024-12-10 RX ADMIN — TACROLIMUS 900 MG: 0.5 CAPSULE ORAL at 09:12

## 2024-12-10 RX ADMIN — ACETAMINOPHEN 650 MG: 325 TABLET ORAL at 01:12

## 2024-12-10 RX ADMIN — DIPHENHYDRAMINE HYDROCHLORIDE 50 MG: 25 CAPSULE ORAL at 12:12

## 2024-12-10 RX ADMIN — GABAPENTIN 400 MG: 300 CAPSULE ORAL at 09:12

## 2024-12-10 RX ADMIN — METHOCARBAMOL 500 MG: 500 TABLET ORAL at 08:12

## 2024-12-10 RX ADMIN — METHOCARBAMOL 500 MG: 500 TABLET ORAL at 04:12

## 2024-12-10 RX ADMIN — SULFAMETHOXAZOLE AND TRIMETHOPRIM 1 TABLET: 800; 160 TABLET ORAL at 08:12

## 2024-12-10 RX ADMIN — METHOCARBAMOL 500 MG: 500 TABLET ORAL at 09:12

## 2024-12-10 RX ADMIN — HYDROCODONE BITARTRATE AND ACETAMINOPHEN 1 TABLET: 5; 325 TABLET ORAL at 09:12

## 2024-12-10 NOTE — PLAN OF CARE
Evaluated and established PT POC.  12/10/2024    Problem: Physical Therapy  Goal: Physical Therapy Goal  Description: Goals to be met by: 2025     Patient will increase functional independence with mobility by performin. Sit to stand transfer with Supervision  2. Bed to chair transfer with Stand-by Assistance using LRAD  3. Gait  x 150 feet with Stand-by Assistance using LRAD..   4. Ascend/descend flight of stairs with bilateral Handrails Contact Guard Assistance using No Assistive Device.     Outcome: Progressing

## 2024-12-10 NOTE — ASSESSMENT & PLAN NOTE
"Admission Pt. With R leg swelling and tenderness, U/S shows extensive thrombus "Right lower extremity occlusive thrombus involving the popliteal vein, both posterior tibial veins, both anterior tibial veins, and both peroneal veins"  -Heparin gtt started for treatment, plan to transition to PO AC at discharge      "

## 2024-12-10 NOTE — ASSESSMENT & PLAN NOTE
Anemia is likely due to chronic disease due to Chronic liver disease. Most recent hemoglobin and hematocrit are listed below.  Recent Labs     12/09/24  1239 12/09/24  2039 12/10/24  0257   HGB 12.9 9.5* 10.7*   HCT 41.9 30.6* 34.5*     Plan  - Monitor serial CBC: Daily  - Transfuse PRBC if patient becomes hemodynamically unstable, symptomatic or H/H drops below 7/21.

## 2024-12-10 NOTE — ASSESSMENT & PLAN NOTE
The likely etiology of thrombocytopenia is sepsis and liver disease. The patients 3 most recent labs are listed below.  Recent Labs     12/09/24  1239 12/09/24  2039 12/10/24  0257   * 101* 98*     Plan  - Will transfuse if platelet count is <10k.  - monitor CBC

## 2024-12-10 NOTE — H&P
[facility]  [facility] Physical    Patient Name: Selwyn Crowder  MRN: [id number]  Patient Class: IP- Inpatient  Admission Date: 12/9/2024  Attending Physician: Chaparro Nassar MD   Primary Care Provider: Melani Primary Doctor         Patient information was obtained from patient, past medical records, and ER records.     Subjective:     Principal Problem:Acute deep vein thrombosis (DVT) of proximal vein of right lower extremity    Chief Complaint:   Chief Complaint   Patient presents with    Wound Check     Wound to right leg states had fever today supposed to have back surgery but got sent down here for possible infection        HPI: 53 yo F with PMHx HTN and chronic back pain with lumbar stenosis who presents to the ED from primary care clinic for leg swelling and fevers x a few days. Pt. Scheduled to undergo laminectomy procedure for her back pain 12/11. She was seen in medicine clinic for preoperative clearance today and vital check noted the patient tachycardic to 129, temp 101.7. Exam noted "bleeding from oval shaped wound approx. 3/4 inch in on R leg. The calf was red, swollen erythematous and tender to touch". Pt. Was referred to ED for further work-up. Pt. Reports noticing the wound about 4 days ago with some associated fevers/chills starting last night. She does not recall any trauma to the area, but she reports that she has had a few falls over the last few weeks going up her steps at home, so she is not sure.    Past Medical History:   Diagnosis Date    Hepatitis C antibody positive in blood 10/14/2024    RNA POSITIVE       Past Surgical History:   Procedure Laterality Date    CHOLECYSTECTOMY      EPIDURAL STEROID INJECTION INTO LUMBAR SPINE N/A 6/6/2024    Procedure: Injection-steroid-epidural-lumbar;  Surgeon: Kenn Miramontes MD;  Location: [facility] OR;  Service: Anesthesiology;  Laterality: N/A;    TRANSFORAMINAL EPIDURAL INJECTION OF STEROID Right 7/11/2024    " Procedure: Injection,steroid,epidural,transforaminal approach;  Surgeon: Kenn Miramontes MD;  Location: [facility] OR;  Service: Pain Management;  Laterality: Right;       Review of patient's allergies indicates:  No Known Allergies    No current facility-administered medications on file prior to encounter.     Current Outpatient Medications on File Prior to Encounter   Medication Sig    amLODIPine (NORVASC) 5 MG tablet Take 1 tablet (5 mg total) by mouth daily as needed (blood pressure greater than 160/100).    benzonatate (TESSALON) 100 MG capsule Take 1 capsule (100 mg total) by mouth 3 (three) times daily as needed for Cough.    diclofenac (VOLTAREN) 50 MG EC tablet Take 1 tablet (50 mg total) by mouth 3 (three) times daily as needed (Pain).    gabapentin (NEURONTIN) 400 MG capsule Take 1 capsule (400 mg total) by mouth 3 (three) times daily. Slowly titrate dose. Start 1 pill nightly x 2 days, then one pill morning and night for the following 2 days, then one pill 3x daily    methocarbamoL (ROBAXIN) 500 MG Tab Take 1-2 tablets 3 times a day as needed for back pain/spasms    promethazine-dextromethorphan (PROMETHAZINE-DM) 6.25-15 mg/5 mL Syrp Take 5 mLs by mouth every 4 (four) hours as needed (cough).    traMADoL (ULTRAM) 50 mg tablet Take 1 tablet (50 mg total) by mouth every 6 (six) hours as needed for Pain.     Family History    None       Tobacco Use    Smoking status: Never    Smokeless tobacco: Never   Substance and Sexual Activity    Alcohol use: Yes    Drug use: Never    Sexual activity: Not Currently     Review of Systems   Constitutional:  Negative for activity change, appetite change, chills, fever and unexpected weight change.   HENT:  Negative for congestion and sore throat.    Respiratory:  Negative for cough and shortness of breath.    Cardiovascular:  Negative for chest pain, palpitations and leg swelling.   Gastrointestinal:  Negative for abdominal distention, abdominal pain, blood in stool,  constipation, diarrhea, nausea and vomiting.   Genitourinary:  Negative for difficulty urinating, dysuria and hematuria.   Musculoskeletal:  Positive for arthralgias and gait problem. Negative for myalgias.   Skin:  Negative for color change and rash.   Neurological:  Negative for dizziness, tremors and seizures.     Objective:     Vital Signs (Most Recent):  Temp: 99.1 °F (37.3 °C) (12/09/24 2204)  Pulse: 96 (12/09/24 2204)  Resp: 18 (12/09/24 2204)  BP: 133/75 (12/09/24 2204)  SpO2: 95 % (12/09/24 2204) Vital Signs (24h Range):  Temp:  [99.1 °F (37.3 °C)-101.7 °F (38.7 °C)] 99.1 °F (37.3 °C)  Pulse:  [] 96  Resp:  [18-20] 18  SpO2:  [95 %-99 %] 95 %  BP: (133-184)/(66-84) 133/75     Weight: 113 kg (249 lb 1.9 oz)  Body mass index is 41.46 kg/m².     Physical Exam  Vitals reviewed.   Constitutional:       General: She is not in acute distress.     Appearance: She is well-developed.   HENT:      Head: Normocephalic and atraumatic.   Eyes:      Extraocular Movements: Extraocular movements intact.      Pupils: Pupils are equal, round, and reactive to light.   Neck:      Vascular: No JVD.      Trachea: No tracheal deviation.   Cardiovascular:      Rate and Rhythm: Normal rate and regular rhythm.      Heart sounds: No murmur heard.     No friction rub. No gallop.   Pulmonary:      Effort: No respiratory distress.      Breath sounds: Normal breath sounds. No wheezing or rales.   Abdominal:      General: Bowel sounds are normal. There is no distension.      Palpations: Abdomen is soft. There is no mass.      Tenderness: There is no abdominal tenderness.   Musculoskeletal:         General: No deformity.      Cervical back: Neck supple.      Right lower leg: Edema present.      Comments: RLE swelling with singificant warmth and tenderness to calf/posterior knee. Noted open wound about 3 cm x 3 cm with  serosanguineous drainage   Lymphadenopathy:      Cervical: No cervical adenopathy.   Skin:     General: Skin is warm  "and dry.      Findings: No rash.   Neurological:      Mental Status: She is alert and oriented to person, place, and time.              CRANIAL NERVES     CN III, IV, VI   Pupils are equal, round, and reactive to light.       Significant Labs: All pertinent labs within the past 24 hours have been reviewed.    Significant Imaging: I have reviewed all pertinent imaging results/findings within the past 24 hours.  Assessment/Plan:     * Acute deep vein thrombosis (DVT) of proximal vein of right lower extremity  -Pt. With R leg swelling and tenderness, U/S shows extensive thrombus "Right lower extremity occlusive thrombus involving the popliteal vein, both posterior tibial veins, both anterior tibial veins, and both peroneal veins"  -F/u trop, BNP for cardiac work-up, consider TTE or CTA if concerning for PE  -Heparin gtt started for treatment, plan to transition to PO AC at discharge        Debility  -Pt. Reports a few falls over the last few weeks, lives upstairs. May have led to trauma which caused current issue. PT/OT assessment ordered      HTN (hypertension)  -Continue amlodipine and coreg    Cellulitis of right lower extremity  -Pt. With DVT and overlying erythema, warmth, and tendernessto R leg. CT soft tissue shows "Skin thickening and diffuse subcutaneous edema throughout the lower leg and distal thigh, nonspecific, may represent cellulitis"  IV clindamycin started in ED, will continue for now. Monitor for clinical improvement and transition to PO when appropriate. Wound care consulted for treatment. Treat DVT as above        VTE Risk Mitigation (From admission, onward)           Ordered     heparin 25,000 units in dextrose 5% (100 units/ml) IV bolus from bag HIGH INTENSITY nomogram - [facility]  As needed (PRN)        Question:  Heparin Infusion Adjustment (DO NOT MODIFY ANSWER)  Answer:  [email or web address]\epic\Images\Pharmacy\HeparinInfusions\heparin HIGH INTENSITY nomogram for [facility] [id number]    12/09/24 1934     " heparin 25,000 units in dextrose 5% (100 units/ml) IV bolus from bag HIGH INTENSITY nomogram - [facility]  As needed (PRN)        Question:  Heparin Infusion Adjustment (DO NOT MODIFY ANSWER)  Answer:  [email or web address]\epic\Images\Pharmacy\HeparinInfusions\heparin HIGH INTENSITY nomogram for [facility] [id number]    12/09/24 1934     IP VTE HIGH RISK PATIENT  Once         12/09/24 2322     Place sequential compression device  Until discontinued         12/09/24 2322     heparin 25,000 units in dextrose 5% 250 mL (100 units/mL) infusion HIGH INTENSITY nomogram - [facility]  Continuous        Question:  Begin at (units/kg/hr)  Answer:  18    12/09/24 1934                    Chaparro Nassar MD  Department of Hospital Medicine  [facility] - Emergency Dept

## 2024-12-10 NOTE — CARE UPDATE
[facility] Based [facility] Sepsis Follow Up Note     Patient's sepsis care was reviewed and a reassessment was performed within 6 hours of sepsis care that showed adequate tissue perfusion by non invasive measurements  on 12/10/2024 at 10:32 AM.    Patient has received appropriate sepsis care and a fluid challenge of Not needed - patient is not hypotensive .    Ruslan Caba, PAAlfredoC  Unit Based [facility]

## 2024-12-10 NOTE — PT/OT/SLP EVAL
Physical Therapy Co-Evaluation and Co-Treatment    Patient Name:  Selwyn Crowder   MRN:  [id number]  Admit Date: 12/9/2024  Admitting Diagnosis:  Cellulitis of right lower extremity   Length of Stay: 1 days  Recent Surgery: * No surgery found *      Recommendations:     Discharge Recommendations: Low Intensity Therapy  Discharge Equipment Recommendations: walker, rolling, bedside commode, bath bench   Barriers to discharge:  physical assistance needed , inaccessible home (2nd floor apartment with no elevator access)    Appropriate transfer level with nursing staff: one person assist stand pivot to chair with RW    Plan:     During this hospitalization, patient to be seen 4 x/week to address the identified rehab impairments via gait training, therapeutic activities, therapeutic exercises, neuromuscular re-education and progress towards the established goals.  Plan of Care Expires:  01/09/25  Plan of Care Reviewed with: patient    Assessment     Selwyn Crowder is a 54 y.o. female admitted with a medical diagnosis of Cellulitis of right lower extremity. Pt presents supine in bed and agreeable to PT session. Significant edema, warm to touch, noted in R LE. Pt performed bed mobility with no physical assistance. Upon examination, pt with decreased ROM and strength in R LE due to pain. RW utilized during standing mobility for stability due to pt's hesitance to bear weight and mobilize R LE. Pt demonstrated toe touch to non weight bearing gait pattern on R LE with hopping on L LE for steps to chair. Pt currently presents well below PLOF with significant gait instability and decreased functional mobility due to pain and edema. Pt would continue to benefit from skilled acute care PT services for improved OOB activity and gait training.      Problem List: weakness, pain, edema, gait instability, impaired balance, decreased safety awareness, decreased lower extremity function, impaired functional mobility, impaired  "sensation, impaired endurance.  Rehab Prognosis: Good; patient would benefit from acute skilled PT services to address these deficits and reach maximum level of function.      Goals:   Multidisciplinary Problems       Physical Therapy Goals          Problem: Physical Therapy    Goal Priority Disciplines Outcome Interventions   Physical Therapy Goal     PT, PT/OT Progressing    Description: Goals to be met by: 2025     Patient will increase functional independence with mobility by performin. Sit to stand transfer with Supervision  2. Bed to chair transfer with Stand-by Assistance using LRAD  3. Gait  x 150 feet with Stand-by Assistance using LRAD..   4. Ascend/descend flight of stairs with bilateral Handrails Contact Guard Assistance using No Assistive Device.                          Subjective     RN notified prior to session. No family present upon PT entrance into room. Patient agreeable to PT evaluation.    Chief Complaint: R LE pain  Patient/Family Comments/goals: "Oh and my mom lives there."  Pain/Comfort:  Pain Rating 1: 710  Location - Side 1: Right  Location 1: leg  Pain Addressed 1: Reposition, Distraction, Nurse notified  Pain Rating Post-Intervention 1: 7/10    Social History:  Residence: Patient lives with their mother in a second floor apartment with number of outside stair(s): flight B HR and no elevator access . Pt's bathroom has a tub/shower.  Equipment Owned (not using): none  Equipment Used: none  Prior level of function:  Prior to admission, patient was independent  Work: Unemployed.   Drive: yes   Assistance Upon Discharge:  None, possibly pt's mother    Objective:     Additional staff present: OT and Supervising PT; OT for co-evaluation due to suspected patient need for two skilled therapists to appropriately assess patient's functional deficits as well as ensure patient safety, accommodate for limited activity tolerance, and provide appropriate, skilled assistance to maximize " "functional potential during evaluation.     Patient found supine with: telemetry, peripheral IV     General Precautions: Standard, Cardiac fall   Orthopedic Precautions:N/A   Braces: N/A   Body mass index is 41.46 kg/m².  Oxygen Device: Room Air  Vitals: BP (!) 114/90   Pulse 97   Temp 99.5 °F (37.5 °C) (Oral)   Resp 16   Ht 5' 5" (1.651 m)   Wt 113 kg (249 lb 1.9 oz)   SpO2 (!) 92%   Breastfeeding No   BMI 41.46 kg/m²     Exams:  Cognition:   Oriented X 4 and Alert   Patient is oriented to Person, Place, Time, Situation  Command following: Follows multistep verbal commands  Fluency: clear/fluent  Skin Integrity: Wound behind R knee  Postural Assessment: forward head  Physical Exam:    Left UE Left LE Right UE Right LE   Edema absent absent absent present   ROM AROM WFL AROM WFL AROM WFL Decreased due to pain   Strength adequate ROM, adequate strength within normal limits adequate ROM, adequate strength Decreased due to pain   Sensation intact to light touch intact to light touch intact to light touch impaired to light touch   Coordination not tested not tested not tested not tested     Outcome Measures:  AM-PAC 6 CLICK MOBILITY  Turning over in bed (including adjusting bedclothes, sheets and blankets)?: 4  Sitting down on and standing up from a chair with arms (e.g., wheelchair, bedside commode, etc.): 3  Moving from lying on back to sitting on the side of the bed?: 4  Moving to and from a bed to a chair (including a wheelchair)?: 3  Need to walk in hospital room?: 3  Climbing 3-5 steps with a railing?: 2  Basic Mobility Total Score: 19     Functional Mobility:    Bed Mobility:   Supine to Sit: stand by assistance for safety; to L side of bed  Scooting anteriorly to EOB to have both feet planted on floor: stand by assistance     Sitting Balance at Edge of Bed:  Static Sitting Balance: Good- : able to accept minimal resistance  Dynamic Sitting Balance: Good- : able to sit unsupported and weight shift across " midline minimally  Assistance Level Required: Stand-by Assistance  Time: ~6 min    Transfers:   Sit <> Stand Transfer: contact guard assistance with rolling walker. [id number] from EOB  Pt not bearing weight in R LE    Standing Balance:  Static Standing Balance: Fair- : requires minimal assistance or UE support in order to stand without LOB  Dynamic Standing Balance: Poor+ : able to stand with minimal assistance and reach ipsilaterally. Unable to weight shift.  Assistance Level Required: Contact Guard Assistance  Patient used: rolling walker   Pt not bearing weight in R LE      Gait:   Patient ambulated: ~ 6 steps to chair   Patient required: minimum assistance  Patient used:  rolling walker   Gait Pattern observed: toe touch gait pattern on R LE  Gait Deviation(s): unsteady gait, decreased foot clearance, flexed posture, decreased harrison, and increased time in stance phase on unaffected leg  Impairments due to: pain  all lines remained intact throughout ambulation trial  Comments: Patient required cues for position in walker, sequencing, and step through gait pattern to increase independence and safety. Patient required cues ~ 50% of the time. Pt with toe touch to no weight bearing in R LE and exces Ue support on RW.      Education:  Time provided for education, counseling and discussion of health disposition in regards to patient's current status  All questions answered within PT scope of practice and to patient's satisfaction  PT role in POC to address current functional deficits  Pt educated on proper body mechanics, safety techniques, and energy conservation with PT facilitation and cueing throughout session  Call nursing/pct to transfer to chair/use bathroom. Pt stated understanding.  Whiteboard updated with therapist name and pt's current mobility status documented above  Safe to perform stand pivot transfer to/from chair/bedside commode Min A and RW w/ nursing/PCT present    Patient left up in chair with all  lines intact, call button in reach, and RN notified.    History:     Past Medical History:   Diagnosis Date    Hepatitis C antibody positive in blood 10/14/2024    RNA POSITIVE    Osteoarthritis of spine with radiculopathy, lumbar region 09/26/2024       Past Surgical History:   Procedure Laterality Date    CHOLECYSTECTOMY      EPIDURAL STEROID INJECTION INTO LUMBAR SPINE N/A 6/6/2024    Procedure: Injection-steroid-epidural-lumbar;  Surgeon: Kenn Miramontes MD;  Location: [facility] OR;  Service: Anesthesiology;  Laterality: N/A;    TRANSFORAMINAL EPIDURAL INJECTION OF STEROID Right 7/11/2024    Procedure: Injection,steroid,epidural,transforaminal approach;  Surgeon: Kenn Miramontes MD;  Location: [facility] OR;  Service: Pain Management;  Laterality: Right;       No family history on file.    Social History     Socioeconomic History    Marital status:    Tobacco Use    Smoking status: Never    Smokeless tobacco: Never   Substance and Sexual Activity    Alcohol use: Yes    Drug use: Never    Sexual activity: Not Currently     Social Drivers of Health     Financial Resource Strain: Medium Risk (12/6/2024)    Overall Financial Resource Strain (CARDIA)     Difficulty of Paying Living Expenses: Somewhat hard   Food Insecurity: Food Insecurity Present (12/6/2024)    Hunger Vital Sign     Worried About Running Out of Food in the Last Year: Sometimes true     Ran Out of Food in the Last Year: Never true   Physical Activity: Insufficiently Active (12/6/2024)    Exercise Vital Sign     Days of Exercise per Week: 2 days     Minutes of Exercise per Session: 30 min   Stress: Stress Concern Present (12/6/2024)    [employer] [place] [employer] - Occupational Stress Questionnaire     Feeling of Stress : Rather much   Housing Stability: Unknown (12/6/2024)    Housing Stability Vital Sign     Unable to Pay for Housing in the Last Year: Patient declined       Time Tracking:     PT Received On: 12/10/24  PT Start Time: 0828      PT Stop Time: 0845  PT Total Time (min): 17 min     Billable Minutes: Evaluation 7 min and Gait Training 10 min    12/10/2024

## 2024-12-10 NOTE — ASSESSMENT & PLAN NOTE
-Pt. Reports a few falls over the last few weeks, lives upstairs. May have led to trauma which caused current issue. PT/OT assessment ordered

## 2024-12-10 NOTE — ASSESSMENT & PLAN NOTE
Pt. Reports a few falls over the last few weeks, lives upstairs. May have led to trauma which caused current issue.   -- PT/OT assessment ordered

## 2024-12-10 NOTE — ASSESSMENT & PLAN NOTE
Hyponatremia is likely due to Dehydration/hypovolemia and Cirrhosis. The patient's most recent sodium results are listed below.  Recent Labs     12/09/24  1435 12/10/24  0257    134*     Plan  - treat underlying issue  - Monitor sodium

## 2024-12-10 NOTE — ASSESSMENT & PLAN NOTE
"-Pt. With R leg swelling and tenderness, U/S shows extensive thrombus "Right lower extremity occlusive thrombus involving the popliteal vein, both posterior tibial veins, both anterior tibial veins, and both peroneal veins"  -F/u trop, BNP for cardiac work-up, consider TTE or CTA if concerning for PE  -Heparin gtt started for treatment, plan to transition to PO AC at discharge      "

## 2024-12-10 NOTE — ASSESSMENT & PLAN NOTE
"-Pt. With DVT and overlying erythema, warmth, and tendernessto R leg. CT soft tissue shows "Skin thickening and diffuse subcutaneous edema throughout the lower leg and distal thigh, nonspecific, may represent cellulitis"  IV clindamycin started in ED, will continue for now. Monitor for clinical improvement and transition to PO when appropriate. Wound care consulted for treatment. Treat DVT as above    "

## 2024-12-10 NOTE — ASSESSMENT & PLAN NOTE
Body mass index is 41.46 kg/m². Morbid obesity complicates all aspects of disease management from diagnostic modalities to treatment. Weight loss encouraged and health benefits explained to patient.

## 2024-12-10 NOTE — NURSING
Primary nurse attempted to draw lab x2. Attempted x2 by other nurse. Phlebotomy called and notified.

## 2024-12-10 NOTE — ASSESSMENT & PLAN NOTE
Admission patient had fever and tachycardia.  This patient does have evidence of infective focus  My overall impression is sepsis.  Source: Skin and Soft Tissue (location leg)  Antibiotics given-   Antibiotics (72h ago, onward)      Start     Stop Route Frequency Ordered    12/10/24 1515  cefTRIAXone injection 2 g         -- IV Daily 12/10/24 1510    12/10/24 0930  clindamycin in D5W 900 mg/50 mL IVPB 900 mg         -- IV Every 8 hours (non-standard times) 12/10/24 0842          Source control achieved by: hilary

## 2024-12-10 NOTE — ED NOTES
Received report from Ck RN. Assumed care of patient. Patient is alert and resting comfortably in bed in NAD. BP, cardiac, and O2 monitoring continued. Patient updated on plan of care, pt denies any needs or complaints at this time. Bed locked in lowest position, side rails up x2, call light within reach. VSS. Pt on Heparin handoff completed.

## 2024-12-10 NOTE — HPI
"53 yo F with PMHx HTN and chronic back pain with lumbar stenosis who presents to the ED from primary care clinic for leg swelling and fevers x a few days. Pt. Scheduled to undergo laminectomy procedure for her back pain 12/11. She was seen in medicine clinic for preoperative clearance today and vital check noted the patient tachycardic to 129, temp 101.7. Exam noted "bleeding from oval shaped wound approx. 3/4 inch in on R leg. The calf was red, swollen erythematous and tender to touch". Pt. Was referred to ED for further work-up.     Pt. Reports noticing the wound about 4 days ago with some associated fevers/chills starting last night. She does not recall any trauma to the area, but she reports that she has had a few falls over the last few weeks going up her steps at home, so she is not sure.  "

## 2024-12-10 NOTE — ED NOTES
Pt care assumed. Report received by ABHINAV Butcher. Pt lying in stretcher in low and locked position and side rails raised x2. Call light and pt's belongings within pt's reach. Pt on continuous cardiac monitoring. Pt in NAD and verbalized no needs at this time.

## 2024-12-10 NOTE — PLAN OF CARE
Problem: Occupational Therapy  Goal: Occupational Therapy Goal  Description: Goals to be met by: 7 days 12/17/24     Patient will increase functional independence with ADLs by performing:    Pt to complete UE dressing with set-up  Pt to complete LE dressing with MIN A with AE as needed   Pt to complete standing g/h skills with SBA  Pt to complete toilet t/f with SBA  Pt to  complete toileting with SBA  Pt to tolerated OOB to chair x 3-4 hours daily to increase endurance for functional activity     Outcome: Progressing

## 2024-12-10 NOTE — PT/OT/SLP EVAL
Occupational Therapy  Co Evaluation    Name: Selwyn Crowder  MRN: [id number]  Admitting Diagnosis: Cellulitis of right lower extremity  Pt scheduled for laminectomy but had fever and swelling in R LE. Pt was sent to ED and found to have DVT. Pt on Heparin drip upon therapy arrival.     Recommendations:     Discharge Recommendations: Low Intensity Therapy  Patient has a mobility limitation that significantly impairs their ability to participate in one or more mobility related activities of daily living, including toileting. This deficit can be resolved by using a bedside commode. Patient demonstrates mobility limitations that will cause them to be confined to one room at home without bathroom access for up to 30 days. Using a bedside commode will greatly improve the patient's ability to participate in MRADLs.   Patient demonstrates a mobility limitation that significantly impairs their ability to participate in one or more mobility related activities of daily living. Patient's mobility limitation cannot be sufficiently resolved with the use of a cane, but can be sufficiently resolved with the use of a rolling walker.The use of a rolling walker will considerably improve their ability to participate in MRADLs. Patient will use the walker on a regular basis at home.        Assessment:     Selwyn Crowder is a 54 y.o. female with a medical diagnosis of Cellulitis of right lower extremityPerformance deficits affecting function: weakness, impaired endurance, impaired self care skills, impaired functional mobility, impaired balance, gait instability, pain.  Pt tolerated session fairly well as she is limited by pain and no weight bearing to R LE due to pain.     Rehab Prognosis: Good; patient would benefit from acute skilled OT services to address these deficits and reach maximum level of function.       Plan:     Patient to be seen 3 x/week to address the above listed problems via self-care/home management,  "therapeutic activities, therapeutic exercises  Plan of Care Expires:    Plan of Care Reviewed with: patient    Subjective   Pt agreeable to therapy.   "I'm starting to feel better" pt states.     Occupational Profile:  Pt resides with mother in second floor apartment with B HR on stairs.   Pt has a daughter nearby to assist as needed and pt's mother can assist as well.   Pt was driving but not working prior to arrival.   Pt was independent prior to arrival, but had been having more difficulty with functional activity due to painful R LE.     Pain/Comfort:  Pain Rating 1: 7/10  Location - Side 1: Right  Location 1: leg  Pain Addressed 1: Reposition, Distraction    Patients cultural, spiritual, Judaism conflicts given the current situation: no    Objective:     Communicated with: nsg prior to session.  Patient found in bed with tele and [place] intact   Coeval completed this date to optimize functional performance and safety given impaired tolerance for activity  General Precautions: Standard, fall      Occupational Performance:    Bed Mobility:    Supine>sit with SBA    Functional Mobility/Transfers:  Sit>stand with CGA  Transfer to chair with RW and KIRAN. Pt not bearing weight throughout R LE due to pain.     Activities of Daily Living:  Feeding: independent  G/H seated with SBA  UE dressing: set-up  LE dressing: TOTAL A deandre footwear.     Cognitive/Visual Perceptual  Pt awake, alert and following commands.     Physical Exam:  Pt is right hand dominant and demo WFL UE strength/ROM, coordination.     AMPAC 6 Click ADL:  AMPAC Total Score: 15    Treatment & Education  Pt demo SBA for postural control seated EOB. Pt able to stand with CGA, but MIN A  with RW needed for safe  t/f to chair. Pt not placing any weight through R LE due to pain.   Education provided re: role of OT and safety with functional mobility/ADL skills.       Patient left up in chair with all lines intact and call button in reach    GOALS: "   Multidisciplinary Problems       Occupational Therapy Goals          Problem: Occupational Therapy    Goal Priority Disciplines Outcome Interventions   Occupational Therapy Goal     OT, PT/OT Progressing    Description: Goals to be met by: 7 days 12/17/24     Patient will increase functional independence with ADLs by performing:    Pt to complete UE dressing with set-up  Pt to complete LE dressing with MIN A with AE as needed   Pt to complete standing g/h skills with SBA  Pt to complete toilet t/f with SBA  Pt to  complete toileting with SBA  Pt to tolerated OOB to chair x 3-4 hours daily to increase endurance for functional activity                          History:     Past Medical History:   Diagnosis Date    Hepatitis C antibody positive in blood 10/14/2024    RNA POSITIVE    Osteoarthritis of spine with radiculopathy, lumbar region 09/26/2024         Past Surgical History:   Procedure Laterality Date    CHOLECYSTECTOMY      EPIDURAL STEROID INJECTION INTO LUMBAR SPINE N/A 6/6/2024    Procedure: Injection-steroid-epidural-lumbar;  Surgeon: Kenn Miramontes MD;  Location: [facility] OR;  Service: Anesthesiology;  Laterality: N/A;    TRANSFORAMINAL EPIDURAL INJECTION OF STEROID Right 7/11/2024    Procedure: Injection,steroid,epidural,transforaminal approach;  Surgeon: Kenn Miramontes MD;  Location: [facility] OR;  Service: Pain Management;  Laterality: Right;       Time Tracking:     OT Date of Treatment: 12/10/24  OT Start Time: 0828  OT Stop Time: 0848  OT Total Time (min): 20 min    Billable Minutes:Evaluation 12  Therapeutic Activity 8    12/10/2024

## 2024-12-10 NOTE — SUBJECTIVE & OBJECTIVE
Past Medical History:   Diagnosis Date    Hepatitis C antibody positive in blood 10/14/2024    RNA POSITIVE       Past Surgical History:   Procedure Laterality Date    CHOLECYSTECTOMY      EPIDURAL STEROID INJECTION INTO LUMBAR SPINE N/A 6/6/2024    Procedure: Injection-steroid-epidural-lumbar;  Surgeon: Kenn Miramontes MD;  Location: [facility] OR;  Service: Anesthesiology;  Laterality: N/A;    TRANSFORAMINAL EPIDURAL INJECTION OF STEROID Right 7/11/2024    Procedure: Injection,steroid,epidural,transforaminal approach;  Surgeon: Kenn Miramontes MD;  Location: [facility] OR;  Service: Pain Management;  Laterality: Right;       Review of patient's allergies indicates:  No Known Allergies    No current facility-administered medications on file prior to encounter.     Current Outpatient Medications on File Prior to Encounter   Medication Sig    amLODIPine (NORVASC) 5 MG tablet Take 1 tablet (5 mg total) by mouth daily as needed (blood pressure greater than 160/100).    benzonatate (TESSALON) 100 MG capsule Take 1 capsule (100 mg total) by mouth 3 (three) times daily as needed for Cough.    diclofenac (VOLTAREN) 50 MG EC tablet Take 1 tablet (50 mg total) by mouth 3 (three) times daily as needed (Pain).    gabapentin (NEURONTIN) 400 MG capsule Take 1 capsule (400 mg total) by mouth 3 (three) times daily. Slowly titrate dose. Start 1 pill nightly x 2 days, then one pill morning and night for the following 2 days, then one pill 3x daily    methocarbamoL (ROBAXIN) 500 MG Tab Take 1-2 tablets 3 times a day as needed for back pain/spasms    promethazine-dextromethorphan (PROMETHAZINE-DM) 6.25-15 mg/5 mL Syrp Take 5 mLs by mouth every 4 (four) hours as needed (cough).    traMADoL (ULTRAM) 50 mg tablet Take 1 tablet (50 mg total) by mouth every 6 (six) hours as needed for Pain.     Family History    None       Tobacco Use    Smoking status: Never    Smokeless tobacco: Never   Substance and Sexual Activity    Alcohol use: Yes    Drug  use: Never    Sexual activity: Not Currently     Review of Systems   Constitutional:  Negative for activity change, appetite change, chills, fever and unexpected weight change.   HENT:  Negative for congestion and sore throat.    Respiratory:  Negative for cough and shortness of breath.    Cardiovascular:  Negative for chest pain, palpitations and leg swelling.   Gastrointestinal:  Negative for abdominal distention, abdominal pain, blood in stool, constipation, diarrhea, nausea and vomiting.   Genitourinary:  Negative for difficulty urinating, dysuria and hematuria.   Musculoskeletal:  Positive for arthralgias and gait problem. Negative for myalgias.   Skin:  Negative for color change and rash.   Neurological:  Negative for dizziness, tremors and seizures.     Objective:     Vital Signs (Most Recent):  Temp: 99.1 °F (37.3 °C) (12/09/24 2204)  Pulse: 96 (12/09/24 2204)  Resp: 18 (12/09/24 2204)  BP: 133/75 (12/09/24 2204)  SpO2: 95 % (12/09/24 2204) Vital Signs (24h Range):  Temp:  [99.1 °F (37.3 °C)-101.7 °F (38.7 °C)] 99.1 °F (37.3 °C)  Pulse:  [] 96  Resp:  [18-20] 18  SpO2:  [95 %-99 %] 95 %  BP: (133-184)/(66-84) 133/75     Weight: 113 kg (249 lb 1.9 oz)  Body mass index is 41.46 kg/m².     Physical Exam  Vitals reviewed.   Constitutional:       General: She is not in acute distress.     Appearance: She is well-developed.   HENT:      Head: Normocephalic and atraumatic.   Eyes:      Extraocular Movements: Extraocular movements intact.      Pupils: Pupils are equal, round, and reactive to light.   Neck:      Vascular: No JVD.      Trachea: No tracheal deviation.   Cardiovascular:      Rate and Rhythm: Normal rate and regular rhythm.      Heart sounds: No murmur heard.     No friction rub. No gallop.   Pulmonary:      Effort: No respiratory distress.      Breath sounds: Normal breath sounds. No wheezing or rales.   Abdominal:      General: Bowel sounds are normal. There is no distension.      Palpations:  Abdomen is soft. There is no mass.      Tenderness: There is no abdominal tenderness.   Musculoskeletal:         General: No deformity.      Cervical back: Neck supple.      Right lower leg: Edema present.      Comments: RLE swelling with singificant warmth and tenderness to calf/posterior knee. Noted open wound about 3 cm x 3 cm with  serosanguineous drainage   Lymphadenopathy:      Cervical: No cervical adenopathy.   Skin:     General: Skin is warm and dry.      Findings: No rash.   Neurological:      Mental Status: She is alert and oriented to person, place, and time.              CRANIAL NERVES     CN III, IV, VI   Pupils are equal, round, and reactive to light.       Significant Labs: All pertinent labs within the past 24 hours have been reviewed.    Significant Imaging: I have reviewed all pertinent imaging results/findings within the past 24 hours.

## 2024-12-10 NOTE — PROGRESS NOTES
"[facility] - Emergency Dept  [facility] Medicine  Progress Note    Patient Name: Selwyn Crowder  MRN: [id number]  Patient Class: IP- Inpatient   Admission Date: 12/9/2024  Length of Stay: 1 days  Attending Physician: Ludwig Gray MD  Primary Care Provider: Melani, Primary Doctor        Subjective     Principal Problem:Cellulitis of right lower extremity        HPI:  55 yo F with PMHx HTN and chronic back pain with lumbar stenosis who presents to the ED from primary care clinic for leg swelling and fevers x a few days. Pt. Scheduled to undergo laminectomy procedure for her back pain 12/11. She was seen in medicine clinic for preoperative clearance today and vital check noted the patient tachycardic to 129, temp 101.7. Exam noted "bleeding from oval shaped wound approx. 3/4 inch in on R leg. The calf was red, swollen erythematous and tender to touch". Pt. Was referred to ED for further work-up.     Pt. Reports noticing the wound about 4 days ago with some associated fevers/chills starting last night. She does not recall any trauma to the area, but she reports that she has had a few falls over the last few weeks going up her steps at home, so she is not sure.    Overview/Hospital Course:  See problem list    Interval History:   Symptoms:    Same malaise.  Diet:   Poor intake.    Activity level:  Impaired due to pain.   Pain: leg pain controlled, requiring pain medication.      Review of Systems   Constitutional:  Positive for chills and fever.   Respiratory:  Negative for shortness of breath.    Cardiovascular:  Negative for chest pain.   Gastrointestinal:  Negative for abdominal pain.     Objective:     Vital Signs (Most Recent):  Temp: (!) 101.1 °F (38.4 °C) (12/10/24 1502)  Pulse: (!) 114 (12/10/24 1339)  Resp: 16 (12/10/24 1314)  BP: (!) 152/82 (12/10/24 1339)  SpO2: (!) 93 % (12/10/24 1339) Vital Signs (24h Range):  Temp:  [98.2 °F (36.8 °C)-102.3 °F (39.1 °C)] 101.1 °F (38.4 °C)  Pulse:  [] 114  Resp:  " "[16-20] 16  SpO2:  [92 %-97 %] 93 %  BP: (114-152)/(61-90) 152/82     Weight: 113 kg (249 lb 1.9 oz)  Body mass index is 41.46 kg/m².    Intake/Output Summary (Last 24 hours) at 12/10/2024 1536  Last data filed at 12/10/2024 0704  Gross per 24 hour   Intake 50 ml   Output 500 ml   Net -450 ml         Physical Exam  Constitutional:       General: She is in acute distress.   Cardiovascular:      Rate and Rhythm: Normal rate and regular rhythm.   Pulmonary:      Effort: No respiratory distress.      Breath sounds: No wheezing.   Abdominal:      General: There is no distension.      Tenderness: There is no abdominal tenderness.   Musculoskeletal:      Right lower leg: Edema (R>L size.  warmth mid calf to above knee with mild induration.  2x2 ulcer back of knee with no exudate) present.      Left lower leg: Edema (mild edema) present.   Neurological:      Mental Status: She is alert and oriented to person, place, and time.   Psychiatric:         Behavior: Behavior normal.             Significant Labs: All pertinent labs within the past 24 hours have been reviewed.    Significant Imaging: I have reviewed all pertinent imaging results/findings within the past 24 hours.    Assessment and Plan     * Cellulitis of right lower extremity  CT soft tissue shows "Skin thickening and diffuse subcutaneous edema throughout the lower leg and distal thigh, nonspecific, may represent cellulitis".  US Soft tissue found no abscesses.  IV clindamycin and bactrim both started in ED.  -- 12/10 due to impressive sepsis will c/w IV clinda and ceftriaxone IV  --   Wound care consulted for treatment of open wound.       Elevated brain natriuretic peptide (BNP) level   in obese patient.  EKG has no pathologic tracings.  Could be due to sepsis.  -- tte      Chronic hepatitis C virus infection  US 12/10/24: Echogenic liver parenchyma, which could reflect steatosis and/or chronic liver disease. + Splenomegaly     Their most current Na-MELD is " listed below.  MELD 3.0: 17 at 12/10/2024 10:11 AM  MELD-Na: 11 at 12/10/2024 10:11 AM  Calculated from:  Serum Creatinine: 0.8 mg/dL (Using min of 1 mg/dL) at 12/10/2024  2:57 AM  Serum Sodium: 134 mmol/L at 12/10/2024  2:57 AM  Total Bilirubin: 2.2 mg/dL at 12/10/2024  2:57 AM  Serum Albumin: 2.4 g/dL at 12/10/2024  2:57 AM  INR(ratio): 1.2 at 12/10/2024 10:11 AM  Age at listing (hypothetical): 54 years  Sex: Female at 12/10/2024 10:11 AM    - monitor labs  -- viral panel ordered, afp  -- needs liver f/u (referral made)    Sepsis  Admission patient had fever and tachycardia.  This patient does have evidence of infective focus  My overall impression is sepsis.  Source: Skin and Soft Tissue (location leg)  Antibiotics given-   Antibiotics (72h ago, onward)      Start     Stop Route Frequency Ordered    12/10/24 1515  cefTRIAXone injection 2 g         -- IV Daily 12/10/24 1510    12/10/24 0930  clindamycin in D5W 900 mg/50 mL IVPB 900 mg         -- IV Every 8 hours (non-standard times) 12/10/24 0842          Source control achieved by: abx    Thrombocytopenia  The likely etiology of thrombocytopenia is sepsis and liver disease. The patients 3 most recent labs are listed below.  Recent Labs     12/09/24  1239 12/09/24  2039 12/10/24  0257   * 101* 98*     Plan  - Will transfuse if platelet count is <10k.  - monitor CBC      Hyponatremia  Hyponatremia is likely due to Dehydration/hypovolemia and Cirrhosis. The patient's most recent sodium results are listed below.  Recent Labs     12/09/24  1435 12/10/24  0257    134*     Plan  - treat underlying issue  - Monitor sodium       Morbid obesity  Body mass index is 41.46 kg/m². Morbid obesity complicates all aspects of disease management from diagnostic modalities to treatment. Weight loss encouraged and health benefits explained to patient.         Anemia  Anemia is likely due to chronic disease due to Chronic liver disease. Most recent hemoglobin and  "hematocrit are listed below.  Recent Labs     12/09/24  1239 12/09/24 2039 12/10/24  0257   HGB 12.9 9.5* 10.7*   HCT 41.9 30.6* 34.5*     Plan  - Monitor serial CBC: Daily  - Transfuse PRBC if patient becomes hemodynamically unstable, symptomatic or H/H drops below 7/21.      Debility  Pt. Reports a few falls over the last few weeks, lives upstairs. May have led to trauma which caused current issue.   -- PT/OT assessment ordered      HTN (hypertension)  Takes amlodipine prn at home  -- given sepsis will hold BP meds and monitor vitals    Acute deep vein thrombosis (DVT) of proximal vein of right lower extremity  Admission Pt. With R leg swelling and tenderness, U/S shows extensive thrombus "Right lower extremity occlusive thrombus involving the popliteal vein, both posterior tibial veins, both anterior tibial veins, and both peroneal veins"  -Heparin gtt started for treatment, plan to transition to PO AC at discharge        Osteoarthritis of spine with radiculopathy, lumbar region  Stable  -- ok for home meds  -- pt/ot        VTE Risk Mitigation (From admission, onward)           Ordered     heparin 25,000 units in dextrose 5% (100 units/ml) IV bolus from bag HIGH INTENSITY nomogram - [facility]  As needed (PRN)        Question:  Heparin Infusion Adjustment (DO NOT MODIFY ANSWER)  Answer:  \\[email or web address]\[facility]\Images\Pharmacy\HeparinInfusions\heparin HIGH INTENSITY nomogram for [facility] [id number]    12/09/24 1934     heparin 25,000 units in dextrose 5% (100 units/ml) IV bolus from bag HIGH INTENSITY nomogram - [facility]  As needed (PRN)        Question:  Heparin Infusion Adjustment (DO NOT MODIFY ANSWER)  Answer:  \\[facility]\[employer]\Images\Pharmacy\HeparinInfusions\heparin HIGH INTENSITY nomogram for [facility] [id number]    12/09/24 1934     IP VTE HIGH RISK PATIENT  Once         12/09/24 2322     Place sequential compression device  Until discontinued         12/09/24 2322     heparin 25,000 units in dextrose 5% 250 mL (100 units/mL) " infusion HIGH INTENSITY nomogram - [facility]  Continuous        Question:  Begin at (units/kg/hr)  Answer:  18    12/09/24 1934                    Discharge Planning   TYSHAWN: 12/12/2024     Code Status: Full Code   Medical Readiness for Discharge Date:                    Please place Justification for DME        Ludwig Gray MD  Department of Hospital Medicine   [facility] - Emergency Dept

## 2024-12-10 NOTE — ASSESSMENT & PLAN NOTE
US 12/10/24: Echogenic liver parenchyma, which could reflect steatosis and/or chronic liver disease. + Splenomegaly     Their most current Na-MELD is listed below.  MELD 3.0: 17 at 12/10/2024 10:11 AM  MELD-Na: 11 at 12/10/2024 10:11 AM  Calculated from:  Serum Creatinine: 0.8 mg/dL (Using min of 1 mg/dL) at 12/10/2024  2:57 AM  Serum Sodium: 134 mmol/L at 12/10/2024  2:57 AM  Total Bilirubin: 2.2 mg/dL at 12/10/2024  2:57 AM  Serum Albumin: 2.4 g/dL at 12/10/2024  2:57 AM  INR(ratio): 1.2 at 12/10/2024 10:11 AM  Age at listing (hypothetical): 54 years  Sex: Female at 12/10/2024 10:11 AM    - monitor labs  -- viral panel ordered, afp  -- needs liver f/u (referral made)

## 2024-12-10 NOTE — ASSESSMENT & PLAN NOTE
"CT soft tissue shows "Skin thickening and diffuse subcutaneous edema throughout the lower leg and distal thigh, nonspecific, may represent cellulitis".  US Soft tissue found no abscesses.  IV clindamycin and bactrim both started in ED.  -- 12/10 due to impressive sepsis will c/w IV clinda and ceftriaxone IV  --   Wound care consulted for treatment of open wound.     "

## 2024-12-10 NOTE — ED NOTES
Assumed care of patient and have received report from nurse.    Selwyn Crowder, a 54 y.o. female presents to the ED w/ complaint of wound check.     Triage note:  Chief Complaint   Patient presents with    Wound Check     Wound to right leg states had fever today supposed to have back surgery but got sent down here for possible infection     Review of patient's allergies indicates:  No Known Allergies  Past Medical History:   Diagnosis Date    Hepatitis C antibody positive in blood 10/14/2024    RNA POSITIVE       Patient identifiers for Selwyn Crowder checked and correct.    LOC: The patient is awake, alert and aware of environment with an appropriate affect, the patient is oriented x 4 and speaking appropriately.    APPEARANCE: Patient resting comfortably and in no acute distress, patient is clean and well groomed, patient's clothing is properly fastened.    SKIN: The skin is warm and dry, color consistent with ethnicity, patient has normal skin turgor  +R leg swelling, abscess on back of right leg     MUSCULOSKELETAL: Patient moving all extremities well +R Leg abscess, patient's abscess has been cleansed and dressing has been changed. Pain rated 2 ou of 10 at this time.     RESPIRATORY: Airway is open and patent, respirations are spontaneous and even, patient has a normal effort and rate.    CARDIAC: Patient has a normal rate and rhythm, no periphreal edema noted, capillary refill < 3 seconds. Normal +2 pedal pulses present.     ABDOMEN: Soft and non tender to palpation, no distention noted. Patient denies any nausea, vomiting, diarrhea, or constipation.     NEUROLOGIC: Eyes open spontaneously, PERRL, behavior appropriate to situation, follows commands, facial expression symmetrical, bilateral hand grasp equal and even, purposeful motor response noted, normal sensation in all extremities.     HEENT: No abnormalities noted. White sclera and pupils equal round and reactive to light. Denies headache,  dizziness.     : Pt voids independently, denies dysuria, hematuria, frequency.

## 2024-12-10 NOTE — SUBJECTIVE & OBJECTIVE
Interval History:   Symptoms:    Same malaise.  Diet:   Poor intake.    Activity level:  Impaired due to pain.   Pain: leg pain controlled, requiring pain medication.      Review of Systems   Constitutional:  Positive for chills and fever.   Respiratory:  Negative for shortness of breath.    Cardiovascular:  Negative for chest pain.   Gastrointestinal:  Negative for abdominal pain.     Objective:     Vital Signs (Most Recent):  Temp: (!) 101.1 °F (38.4 °C) (12/10/24 1502)  Pulse: (!) 114 (12/10/24 1339)  Resp: 16 (12/10/24 1314)  BP: (!) 152/82 (12/10/24 1339)  SpO2: (!) 93 % (12/10/24 1339) Vital Signs (24h Range):  Temp:  [98.2 °F (36.8 °C)-102.3 °F (39.1 °C)] 101.1 °F (38.4 °C)  Pulse:  [] 114  Resp:  [16-20] 16  SpO2:  [92 %-97 %] 93 %  BP: (114-152)/(61-90) 152/82     Weight: 113 kg (249 lb 1.9 oz)  Body mass index is 41.46 kg/m².    Intake/Output Summary (Last 24 hours) at 12/10/2024 1536  Last data filed at 12/10/2024 0704  Gross per 24 hour   Intake 50 ml   Output 500 ml   Net -450 ml         Physical Exam  Constitutional:       General: She is in acute distress.   Cardiovascular:      Rate and Rhythm: Normal rate and regular rhythm.   Pulmonary:      Effort: No respiratory distress.      Breath sounds: No wheezing.   Abdominal:      General: There is no distension.      Tenderness: There is no abdominal tenderness.   Musculoskeletal:      Right lower leg: Edema (R>L size.  warmth mid calf to above knee with mild induration.  2x2 ulcer back of knee with no exudate) present.      Left lower leg: Edema (mild edema) present.   Neurological:      Mental Status: She is alert and oriented to person, place, and time.   Psychiatric:         Behavior: Behavior normal.             Significant Labs: All pertinent labs within the past 24 hours have been reviewed.    Significant Imaging: I have reviewed all pertinent imaging results/findings within the past 24 hours.

## 2024-12-11 PROBLEM — R09.82 POST-NASAL DRIP: Status: ACTIVE | Noted: 2024-12-11

## 2024-12-11 LAB
AFP SERPL-MCNC: 22 NG/ML (ref 0–8.4)
ALBUMIN SERPL BCP-MCNC: 2.2 G/DL (ref 3.5–5.2)
ALP SERPL-CCNC: 83 U/L (ref 40–150)
ALT SERPL W/O P-5'-P-CCNC: 30 U/L (ref 10–44)
ANION GAP SERPL CALC-SCNC: 7 MMOL/L (ref 8–16)
ANION GAP SERPL CALC-SCNC: 7 MMOL/L (ref 8–16)
APTT PPP: 32.3 SEC (ref 21–32)
APTT PPP: 37.3 SEC (ref 21–32)
ASCENDING AORTA: 2.56 CM
AST SERPL-CCNC: 43 U/L (ref 10–40)
AV AREA BY CONTINUOUS VTI: 2.8 CM2
AV INDEX (PROSTH): 0.88
AV LVOT MEAN GRADIENT: 4 MMHG
AV LVOT PEAK GRADIENT: 7 MMHG
AV MEAN GRADIENT: 7.7 MMHG
AV PEAK GRADIENT: 13 MMHG
AV VALVE AREA BY VELOCITY RATIO: 2.4 CM²
AV VALVE AREA: 2.8 CM2
AV VELOCITY RATIO: 0.78
BASOPHILS # BLD AUTO: 0.01 K/UL (ref 0–0.2)
BASOPHILS # BLD AUTO: 0.02 K/UL (ref 0–0.2)
BASOPHILS NFR BLD: 0.1 % (ref 0–1.9)
BASOPHILS NFR BLD: 0.2 % (ref 0–1.9)
BILIRUB SERPL-MCNC: 1 MG/DL (ref 0.1–1)
BSA FOR ECHO PROCEDURE: 2.28 M2
BUN SERPL-MCNC: 11 MG/DL (ref 6–20)
BUN SERPL-MCNC: 12 MG/DL (ref 6–20)
CALCIUM SERPL-MCNC: 7.4 MG/DL (ref 8.7–10.5)
CALCIUM SERPL-MCNC: 7.8 MG/DL (ref 8.7–10.5)
CHLORIDE SERPL-SCNC: 105 MMOL/L (ref 95–110)
CHLORIDE SERPL-SCNC: 105 MMOL/L (ref 95–110)
CO2 SERPL-SCNC: 20 MMOL/L (ref 23–29)
CO2 SERPL-SCNC: 21 MMOL/L (ref 23–29)
CREAT SERPL-MCNC: 0.8 MG/DL (ref 0.5–1.4)
CREAT SERPL-MCNC: 0.9 MG/DL (ref 0.5–1.4)
CRP SERPL-MCNC: 12.1 MG/L (ref 0–8.2)
CV ECHO LV RWT: 0.36 CM
DIFFERENTIAL METHOD BLD: ABNORMAL
DIFFERENTIAL METHOD BLD: ABNORMAL
DOP CALC AO PEAK VEL: 1.8 M/S
DOP CALC AO VTI: 30.8 CM
DOP CALC LVOT AREA: 3.1 CM2
DOP CALC LVOT DIAMETER: 2 CM
DOP CALC LVOT PEAK VEL: 1.4 M/S
DOP CALC LVOT STROKE VOLUME: 85.4 CM3
DOP CALCLVOT PEAK VEL VTI: 27.2 CM
E WAVE DECELERATION TIME: 273.41 MS
E/A RATIO: 0.88
E/E' RATIO: 11.29 M/S
ECHO EF ESTIMATED: 70 %
ECHO LV POSTERIOR WALL: 0.8 CM (ref 0.6–1.1)
EOSINOPHIL # BLD AUTO: 0 K/UL (ref 0–0.5)
EOSINOPHIL # BLD AUTO: 0 K/UL (ref 0–0.5)
EOSINOPHIL NFR BLD: 0.2 % (ref 0–8)
EOSINOPHIL NFR BLD: 0.4 % (ref 0–8)
ERYTHROCYTE [DISTWIDTH] IN BLOOD BY AUTOMATED COUNT: 12.5 % (ref 11.5–14.5)
ERYTHROCYTE [DISTWIDTH] IN BLOOD BY AUTOMATED COUNT: 12.6 % (ref 11.5–14.5)
EST. GFR  (NO RACE VARIABLE): >60 ML/MIN/1.73 M^2
EST. GFR  (NO RACE VARIABLE): >60 ML/MIN/1.73 M^2
ESTIMATED AVG GLUCOSE: 123 MG/DL (ref 68–131)
FRACTIONAL SHORTENING: 38.6 % (ref 28–44)
GLUCOSE SERPL-MCNC: 112 MG/DL (ref 70–110)
GLUCOSE SERPL-MCNC: 130 MG/DL (ref 70–110)
HAV IGM SERPL QL IA: ABNORMAL
HBA1C MFR BLD: 5.9 % (ref 4–5.6)
HBV CORE IGM SERPL QL IA: ABNORMAL
HBV SURFACE AG SERPL QL IA: ABNORMAL
HCT VFR BLD AUTO: 31.7 % (ref 37–48.5)
HCT VFR BLD AUTO: 33.3 % (ref 37–48.5)
HCV AB SERPL QL IA: REACTIVE
HGB BLD-MCNC: 10.5 G/DL (ref 12–16)
HGB BLD-MCNC: 10.8 G/DL (ref 12–16)
IMM GRANULOCYTES # BLD AUTO: 0.05 K/UL (ref 0–0.04)
IMM GRANULOCYTES # BLD AUTO: 0.06 K/UL (ref 0–0.04)
IMM GRANULOCYTES NFR BLD AUTO: 0.6 % (ref 0–0.5)
IMM GRANULOCYTES NFR BLD AUTO: 0.8 % (ref 0–0.5)
INTERVENTRICULAR SEPTUM: 0.7 CM (ref 0.6–1.1)
IVC DIAMETER: 1.4 CM
LA MAJOR: 4.8 CM
LA MINOR: 4.53 CM
LA WIDTH: 4.54 CM
LACTATE SERPL-SCNC: 0.9 MMOL/L (ref 0.5–2.2)
LEFT ATRIUM SIZE: 3.35 CM
LEFT ATRIUM VOLUME INDEX MOD: 31.3 ML/M2
LEFT ATRIUM VOLUME INDEX: 27.1 ML/M2
LEFT ATRIUM VOLUME MOD: 69.42 ML
LEFT ATRIUM VOLUME: 60.26 CM3
LEFT INTERNAL DIMENSION IN SYSTOLE: 2.7 CM (ref 2.1–4)
LEFT VENTRICLE DIASTOLIC VOLUME INDEX: 39.82 ML/M2
LEFT VENTRICLE DIASTOLIC VOLUME: 88.41 ML
LEFT VENTRICLE MASS INDEX: 45.3 G/M2
LEFT VENTRICLE SYSTOLIC VOLUME INDEX: 11.9 ML/M2
LEFT VENTRICLE SYSTOLIC VOLUME: 26.31 ML
LEFT VENTRICULAR INTERNAL DIMENSION IN DIASTOLE: 4.4 CM (ref 3.5–6)
LEFT VENTRICULAR MASS: 100.6 G
LV LATERAL E/E' RATIO: 12
LV SEPTAL E/E' RATIO: 10.67
LYMPHOCYTES # BLD AUTO: 1.8 K/UL (ref 1–4.8)
LYMPHOCYTES # BLD AUTO: 1.9 K/UL (ref 1–4.8)
LYMPHOCYTES NFR BLD: 22.1 % (ref 18–48)
LYMPHOCYTES NFR BLD: 24.6 % (ref 18–48)
MCH RBC QN AUTO: 29.8 PG (ref 27–31)
MCH RBC QN AUTO: 30.6 PG (ref 27–31)
MCHC RBC AUTO-ENTMCNC: 32.4 G/DL (ref 32–36)
MCHC RBC AUTO-ENTMCNC: 33.1 G/DL (ref 32–36)
MCV RBC AUTO: 92 FL (ref 82–98)
MCV RBC AUTO: 92 FL (ref 82–98)
MONOCYTES # BLD AUTO: 0.5 K/UL (ref 0.3–1)
MONOCYTES # BLD AUTO: 0.6 K/UL (ref 0.3–1)
MONOCYTES NFR BLD: 6.9 % (ref 4–15)
MONOCYTES NFR BLD: 7.6 % (ref 4–15)
MV PEAK A VEL: 1.09 M/S
MV PEAK E VEL: 0.96 M/S
NEUTROPHILS # BLD AUTO: 5.1 K/UL (ref 1.8–7.7)
NEUTROPHILS # BLD AUTO: 5.6 K/UL (ref 1.8–7.7)
NEUTROPHILS NFR BLD: 67.2 % (ref 38–73)
NEUTROPHILS NFR BLD: 69.3 % (ref 38–73)
NRBC BLD-RTO: 0 /100 WBC
NRBC BLD-RTO: 0 /100 WBC
OHS CV RV/LV RATIO: 0.45 CM
PISA TR MAX VEL: 2.79 M/S
PLATELET # BLD AUTO: 108 K/UL (ref 150–450)
PLATELET # BLD AUTO: 108 K/UL (ref 150–450)
PMV BLD AUTO: 10.7 FL (ref 9.2–12.9)
PMV BLD AUTO: 10.8 FL (ref 9.2–12.9)
POTASSIUM SERPL-SCNC: 4.4 MMOL/L (ref 3.5–5.1)
POTASSIUM SERPL-SCNC: 4.7 MMOL/L (ref 3.5–5.1)
PROT SERPL-MCNC: 5.7 G/DL (ref 6–8.4)
RA MAJOR: 4.54 CM
RA PRESSURE ESTIMATED: 3 MMHG
RA WIDTH: 4.17 CM
RBC # BLD AUTO: 3.43 M/UL (ref 4–5.4)
RBC # BLD AUTO: 3.62 M/UL (ref 4–5.4)
RIGHT ATRIAL AREA: 19.9 CM2
RIGHT VENTRICLE DIASTOLIC BASEL DIMENSION: 2 CM
RV TB RVSP: 6 MMHG
RV TISSUE DOPPLER FREE WALL SYSTOLIC VELOCITY 1 (APICAL 4 CHAMBER VIEW): 19.91 CM/S
SINUS: 2.56 CM
SODIUM SERPL-SCNC: 132 MMOL/L (ref 136–145)
SODIUM SERPL-SCNC: 133 MMOL/L (ref 136–145)
STJ: 2.45 CM
TDI LATERAL: 0.08 M/S
TDI SEPTAL: 0.09 M/S
TDI: 0.09 M/S
TR MAX PG: 31 MMHG
TRICUSPID ANNULAR PLANE SYSTOLIC EXCURSION: 3.01 CM
TV PEAK GRADIENT: 31 MMHG
TV REST PULMONARY ARTERY PRESSURE: 34 MMHG
WBC # BLD AUTO: 7.53 K/UL (ref 3.9–12.7)
WBC # BLD AUTO: 8.02 K/UL (ref 3.9–12.7)
Z-SCORE OF LEFT VENTRICULAR DIMENSION IN END DIASTOLE: -5.67
Z-SCORE OF LEFT VENTRICULAR DIMENSION IN END SYSTOLE: -4.39

## 2024-12-11 PROCEDURE — 36415 COLL VENOUS BLD VENIPUNCTURE: CPT | Performed by: HOSPITALIST

## 2024-12-11 PROCEDURE — 25000242 PHARM REV CODE 250 ALT 637 W/ HCPCS: Performed by: HOSPITALIST

## 2024-12-11 PROCEDURE — 85730 THROMBOPLASTIN TIME PARTIAL: CPT | Mod: 91 | Performed by: HOSPITALIST

## 2024-12-11 PROCEDURE — 83036 HEMOGLOBIN GLYCOSYLATED A1C: CPT | Performed by: HOSPITALIST

## 2024-12-11 PROCEDURE — 85025 COMPLETE CBC W/AUTO DIFF WBC: CPT | Performed by: HOSPITALIST

## 2024-12-11 PROCEDURE — 85730 THROMBOPLASTIN TIME PARTIAL: CPT | Performed by: HOSPITALIST

## 2024-12-11 PROCEDURE — 80053 COMPREHEN METABOLIC PANEL: CPT | Performed by: HOSPITALIST

## 2024-12-11 PROCEDURE — 80048 BASIC METABOLIC PNL TOTAL CA: CPT | Performed by: HOSPITALIST

## 2024-12-11 PROCEDURE — 83605 ASSAY OF LACTIC ACID: CPT | Performed by: HOSPITALIST

## 2024-12-11 PROCEDURE — 25000003 PHARM REV CODE 250: Performed by: HOSPITALIST

## 2024-12-11 PROCEDURE — 80074 ACUTE HEPATITIS PANEL: CPT | Performed by: HOSPITALIST

## 2024-12-11 PROCEDURE — 86140 C-REACTIVE PROTEIN: CPT | Performed by: HOSPITALIST

## 2024-12-11 PROCEDURE — 97535 SELF CARE MNGMENT TRAINING: CPT

## 2024-12-11 PROCEDURE — 63600175 PHARM REV CODE 636 W HCPCS: Performed by: HOSPITALIST

## 2024-12-11 PROCEDURE — 63600175 PHARM REV CODE 636 W HCPCS: Performed by: STUDENT IN AN ORGANIZED HEALTH CARE EDUCATION/TRAINING PROGRAM

## 2024-12-11 PROCEDURE — 11000001 HC ACUTE MED/SURG PRIVATE ROOM

## 2024-12-11 PROCEDURE — 85025 COMPLETE CBC W/AUTO DIFF WBC: CPT | Mod: 91 | Performed by: HOSPITALIST

## 2024-12-11 PROCEDURE — 97116 GAIT TRAINING THERAPY: CPT

## 2024-12-11 PROCEDURE — 87040 BLOOD CULTURE FOR BACTERIA: CPT | Mod: 59 | Performed by: HOSPITALIST

## 2024-12-11 PROCEDURE — 97530 THERAPEUTIC ACTIVITIES: CPT

## 2024-12-11 PROCEDURE — 82105 ALPHA-FETOPROTEIN SERUM: CPT | Performed by: HOSPITALIST

## 2024-12-11 PROCEDURE — 21400001 HC TELEMETRY ROOM

## 2024-12-11 RX ORDER — FLUTICASONE PROPIONATE 50 MCG
2 SPRAY, SUSPENSION (ML) NASAL DAILY
Status: DISCONTINUED | OUTPATIENT
Start: 2024-12-11 | End: 2024-12-14 | Stop reason: HOSPADM

## 2024-12-11 RX ADMIN — METHOCARBAMOL 500 MG: 500 TABLET ORAL at 11:12

## 2024-12-11 RX ADMIN — METHOCARBAMOL 500 MG: 500 TABLET ORAL at 08:12

## 2024-12-11 RX ADMIN — GABAPENTIN 400 MG: 300 CAPSULE ORAL at 11:12

## 2024-12-11 RX ADMIN — HYDROCODONE BITARTRATE AND ACETAMINOPHEN 1 TABLET: 5; 325 TABLET ORAL at 01:12

## 2024-12-11 RX ADMIN — FLUTICASONE PROPIONATE 100 MCG: 50 SPRAY, METERED NASAL at 01:12

## 2024-12-11 RX ADMIN — DIPHENHYDRAMINE HYDROCHLORIDE 50 MG: 25 CAPSULE ORAL at 04:12

## 2024-12-11 RX ADMIN — GABAPENTIN 400 MG: 300 CAPSULE ORAL at 04:12

## 2024-12-11 RX ADMIN — HEPARIN SODIUM 17 UNITS/KG/HR: 10000 INJECTION, SOLUTION INTRAVENOUS at 04:12

## 2024-12-11 RX ADMIN — ACETAMINOPHEN 650 MG: 325 TABLET ORAL at 08:12

## 2024-12-11 RX ADMIN — GABAPENTIN 400 MG: 300 CAPSULE ORAL at 08:12

## 2024-12-11 RX ADMIN — SODIUM CHLORIDE, POTASSIUM CHLORIDE, SODIUM LACTATE AND CALCIUM CHLORIDE 1000 ML: 600; 310; 30; 20 INJECTION, SOLUTION INTRAVENOUS at 01:12

## 2024-12-11 RX ADMIN — HYDROCODONE BITARTRATE AND ACETAMINOPHEN 1 TABLET: 5; 325 TABLET ORAL at 03:12

## 2024-12-11 RX ADMIN — METHOCARBAMOL 500 MG: 500 TABLET ORAL at 04:12

## 2024-12-11 RX ADMIN — VANCOMYCIN HYDROCHLORIDE 1250 MG: 1.25 INJECTION, POWDER, LYOPHILIZED, FOR SOLUTION INTRAVENOUS at 01:12

## 2024-12-11 RX ADMIN — CEFTRIAXONE 2 G: 2 INJECTION, POWDER, FOR SOLUTION INTRAMUSCULAR; INTRAVENOUS at 11:12

## 2024-12-11 RX ADMIN — HYDROCODONE BITARTRATE AND ACETAMINOPHEN 1 TABLET: 5; 325 TABLET ORAL at 11:12

## 2024-12-11 RX ADMIN — DIPHENHYDRAMINE HYDROCHLORIDE 50 MG: 25 CAPSULE ORAL at 11:12

## 2024-12-11 RX ADMIN — VANCOMYCIN HYDROCHLORIDE 2500 MG: 1.25 INJECTION, POWDER, LYOPHILIZED, FOR SOLUTION INTRAVENOUS at 01:12

## 2024-12-11 NOTE — ASSESSMENT & PLAN NOTE
She is aware.  She has had it for 25 years from a tattoo and was treated but learned recently that it has recurred.  US 12/10/24: Echogenic liver parenchyma, which could reflect steatosis and/or chronic liver disease. + Splenomegaly   TTE ok.  HAV and HBV negative.    Their most current Na-MELD is listed below.  MELD 3.0: 15 at 12/11/2024  6:29 AM  MELD-Na: 8 at 12/11/2024  6:29 AM  Calculated from:  Serum Creatinine: 0.8 mg/dL (Using min of 1 mg/dL) at 12/11/2024  6:29 AM  Serum Sodium: 132 mmol/L at 12/11/2024  6:29 AM  Total Bilirubin: 1 mg/dL at 12/11/2024  6:29 AM  Serum Albumin: 2.2 g/dL at 12/11/2024  6:29 AM  INR(ratio): 1.2 at 12/10/2024 10:11 AM  Age at listing (hypothetical): 54 years  Sex: Female at 12/11/2024  6:29 AM    - monitor labs  -- viral panel ordered, afp  -- needs liver f/u (referral made)

## 2024-12-11 NOTE — PLAN OF CARE
Problem: Adult Inpatient Plan of Care  Goal: Plan of Care Review  Outcome: Progressing  Goal: Patient-Specific Goal (Individualized)  Outcome: Progressing  Goal: Absence of Hospital-Acquired Illness or Injury  Outcome: Progressing  Goal: Optimal Comfort and Wellbeing  Outcome: Progressing   POC reviewed with pt. A&Ox4. Room air. Heparin gtt infusing at 17 units/kg/hr. Tele monitoring in place. Safety checks performed.

## 2024-12-11 NOTE — ASSESSMENT & PLAN NOTE
"Admission Pt. With R leg swelling and tenderness, U/S shows extensive thrombus "Right lower extremity occlusive thrombus involving the popliteal vein, both posterior tibial veins, both anterior tibial veins, and both peroneal veins"  -Heparin gtt started for treatment, plan to transition to PO AC once confident the cellulitis will not mature to an abscess and need drainage      "

## 2024-12-11 NOTE — ASSESSMENT & PLAN NOTE
Pt. Reports a few falls over the last few weeks, lives upstairs. May have led to trauma which caused current issue.   -- PT/OT assessment ordered.  Might need Home Health

## 2024-12-11 NOTE — ASSESSMENT & PLAN NOTE
Hyponatremia is likely due to Dehydration/hypovolemia and Cirrhosis. The patient's most recent sodium results are listed below.  Recent Labs     12/10/24  0257 12/11/24  0036 12/11/24  0629   * 133* 132*       Plan  - treat underlying issue  - Monitor sodium

## 2024-12-11 NOTE — PLAN OF CARE
Problem: Adult Inpatient Plan of Care  Goal: Plan of Care Review  Outcome: Progressing  Flowsheets (Taken 12/11/2024 0203)  Plan of Care Reviewed With: patient  Goal: Patient-Specific Goal (Individualized)  Outcome: Progressing  Goal: Absence of Hospital-Acquired Illness or Injury  Outcome: Progressing  Intervention: Prevent Skin Injury  Flowsheets (Taken 12/11/2024 0203)  Skin Protection: incontinence pads utilized  Device Skin Pressure Protection: absorbent pad utilized/changed  Intervention: Prevent and Manage VTE (Venous Thromboembolism) Risk  Flowsheets (Taken 12/11/2024 0203)  VTE Prevention/Management:   bleeding precautions maintained   bleeding risk assessed  Intervention: Prevent Infection  Flowsheets (Taken 12/11/2024 0203)  Infection Prevention: hand hygiene promoted  Goal: Optimal Comfort and Wellbeing  Outcome: Progressing  Intervention: Monitor Pain and Promote Comfort  Flowsheets (Taken 12/11/2024 0203)  Pain Management Interventions:   care clustered   pain management plan reviewed with patient/caregiver   pillow support provided   position adjusted   quiet environment facilitated  Intervention: Provide Person-Centered Care  Flowsheets (Taken 12/11/2024 0203)  Trust Relationship/Rapport:   care explained   thoughts/feelings acknowledged  Goal: Readiness for Transition of Care  Outcome: Progressing     Problem: Sepsis/Septic Shock  Goal: Optimal Coping  Outcome: Progressing  Intervention: Optimize Psychosocial Adjustment to Illness  Flowsheets (Taken 12/11/2024 0203)  Supportive Measures:   active listening utilized   verbalization of feelings encouraged  Goal: Absence of Bleeding  Outcome: Progressing  Goal: Blood Glucose Level Within Targeted Range  Outcome: Progressing  Goal: Absence of Infection Signs and Symptoms  Outcome: Progressing  Intervention: Initiate Sepsis Management  Flowsheets (Taken 12/11/2024 0203)  Infection Prevention: hand hygiene promoted  Intervention: Promote  Stabilization  Flowsheets (Taken 12/11/2024 0203)  Fluid/Electrolyte Management: fluids provided  Intervention: Promote Recovery  Flowsheets (Taken 12/11/2024 0203)  Sleep/Rest Enhancement:   awakenings minimized   regular sleep/rest pattern promoted  Activity Management: Rolling - L1  Goal: Optimal Nutrition Intake  Outcome: Progressing     Problem: Wound  Goal: Optimal Coping  Outcome: Progressing  Intervention: Support Patient and Family Response  Flowsheets (Taken 12/11/2024 0203)  Supportive Measures:   active listening utilized   verbalization of feelings encouraged  Goal: Optimal Functional Ability  Outcome: Progressing  Intervention: Optimize Functional Ability  Flowsheets (Taken 12/11/2024 0203)  Activity Management: Rolling - L1  Goal: Absence of Infection Signs and Symptoms  Outcome: Progressing  Goal: Improved Oral Intake  Outcome: Progressing  Goal: Optimal Pain Control and Function  Outcome: Progressing  Intervention: Prevent or Manage Pain  Flowsheets (Taken 12/11/2024 0203)  Sleep/Rest Enhancement:   awakenings minimized   regular sleep/rest pattern promoted  Pain Management Interventions:   care clustered   pain management plan reviewed with patient/caregiver   pillow support provided   position adjusted   quiet environment facilitated  Goal: Skin Health and Integrity  Outcome: Progressing  Intervention: Optimize Skin Protection  Flowsheets (Taken 12/11/2024 0203)  Pressure Reduction Techniques: frequent weight shift encouraged  Pressure Reduction Devices: positioning supports utilized  Skin Protection: incontinence pads utilized  Activity Management: Rolling - L1  Head of Bed (HOB) Positioning: HOB lowered  Goal: Optimal Wound Healing  Outcome: Progressing  Intervention: Promote Wound Healing  Flowsheets (Taken 12/11/2024 0203)  Sleep/Rest Enhancement:   awakenings minimized   regular sleep/rest pattern promoted

## 2024-12-11 NOTE — ASSESSMENT & PLAN NOTE
The likely etiology of thrombocytopenia is sepsis and liver disease. The patients 3 most recent labs are listed below.  Recent Labs     12/10/24  0257 12/11/24  0036 12/11/24  0629   PLT 98* 108* 108*       Plan  - Will transfuse if platelet count is <10k.  - monitor CBC

## 2024-12-11 NOTE — ASSESSMENT & PLAN NOTE
"CT soft tissue shows "Skin thickening and diffuse subcutaneous edema throughout the lower leg and distal thigh, nonspecific, may represent cellulitis".  US Soft tissue found no abscesses.  IV clindamycin and bactrim both started in ED.  -- 12/10 due to impressive sepsis will c/w IV clinda and ceftriaxone IV; due to persistent fever clinda changed to vanc 12/11  --   Wound care consulted for treatment of open wound.     "

## 2024-12-11 NOTE — PROGRESS NOTES
Pharmacokinetic Initial Assessment: IV Vancomycin    Assessment/Plan:    Initiate intravenous vancomycin with loading dose of 2500 mg once followed by a maintenance dose of vancomycin 1250 mg IV every 12 hours.  - Ms. Crowder's renal function appears stable and at baseline.  - Desired empiric serum trough concentration is 10 to 20 mcg/mL.  - Draw vancomycin trough level 60 min prior to fourth dose on 12/12 at approximately 1300.  - Please draw random level sooner than scheduled trough if renal function changes significantly.    Pharmacy will continue to follow and monitor vancomycin.      Please contact pharmacy at extension [phone] with any questions regarding this assessment.     Thank you for the consult,   Leydi Gonsalez       Patient brief summary:  Selwyn Crowder is a 54 y.o. female initiated on antimicrobial therapy with IV Vancomycin for treatment of suspected skin & soft tissue infection    Actual Body Weight:   119.2 kg    Renal Function:   Estimated Creatinine Clearance: 101.5 mL/min (based on SCr of 0.8 mg/dL).    Dialysis Method (if applicable):  N/A

## 2024-12-11 NOTE — ASSESSMENT & PLAN NOTE
Anemia is likely due to chronic disease due to Chronic liver disease. Most recent hemoglobin and hematocrit are listed below.  Recent Labs     12/10/24  0257 12/11/24  0036 12/11/24  0629   HGB 10.7* 10.8* 10.5*   HCT 34.5* 33.3* 31.7*       Plan  - Monitor serial CBC: Daily  - Transfuse PRBC if patient becomes hemodynamically unstable, symptomatic or H/H drops below 7/21.

## 2024-12-11 NOTE — PROGRESS NOTES
12/10/24 2356   Vital Signs   Temp (!) 100.7 °F (38.2 °C)   Temp Source Oral   Pulse 100   Heart Rate Source Monitor   Resp 18   SpO2 96 %   /70   MAP (mmHg) 88   BP Location Left arm   Patient Position Lying       MD Nellie notified of pt's temp. Labs and LR bolus ordered. IV abx changed from clindamycin to vancomycin. Pt has no concerns at this time.

## 2024-12-11 NOTE — PT/OT/SLP PROGRESS
Physical Therapy Treatment    Patient Name:  Selwyn Crowder   MRN:  [id number]    Recommendations:     Discharge Recommendations: Low Intensity Therapy  Discharge Equipment Recommendations: walker, rolling, 3-in-1 commode, bath bench, wheelchair  Barriers to discharge: None    Assessment:     Selwyn Crowder is a 54 y.o. female admitted with a medical diagnosis of Cellulitis of right lower extremity.  She presents with the following impairments/functional limitations: weakness, impaired endurance, impaired self care skills, impaired functional mobility, gait instability, impaired balance, pain, impaired skin, edema Pt. cooperative and performed treatment with limited tolerance due to (R) LE pain. Pt. progressing slowly with mobility with RW and limited weight bearing on (R) LE. Pt. considering post-acute PT at either SNF or IP Rehab upon discharge, but still wanting to go home with HH/OP PT.    Rehab Prognosis: Good; patient would benefit from acute skilled PT services to address these deficits and reach maximum level of function.    Recent Surgery: * No surgery found *      Plan:     During this hospitalization, patient to be seen 4 x/week to address the identified rehab impairments via gait training, therapeutic activities, therapeutic exercises, neuromuscular re-education and progress toward the following goals:    Plan of Care Expires:  01/09/25    Subjective     Chief Complaint: (R) LE pain with weight bearing  Patient/Family Comments/goals: pt. Agreeable to PT  Pain/Comfort:  Pain Rating 1:  (pt. did not rate, but did not want to bear weight on (R) LE)  Location - Side 1: Right  Location 1: leg  Pain Addressed 1: Pre-medicate for activity, Reposition, Distraction      Objective:     Communicated with nursing prior to session.  Patient found supine with peripheral IV, telemetry upon PT entry to room.     General Precautions: Standard, fall  Orthopedic Precautions: N/A  Braces: N/A  Respiratory Status:  Room air     Functional Mobility:  Bed Mobility:     Rolling Right: stand by assistance  Scooting: stand by assistance  Supine to Sit: minimum assistance  Transfers:     Sit to Stand:  contact guard assistance with rolling walker  Gait: 5' with RW and CGA with limited WB on (R) LE followed with rolling bedside chair for safety. Pt. Required to sit and returned to bedside in chair  Balance: fair      AM-PAC 6 CLICK MOBILITY  Turning over in bed (including adjusting bedclothes, sheets and blankets)?: 3  Sitting down on and standing up from a chair with arms (e.g., wheelchair, bedside commode, etc.): 3  Moving from lying on back to sitting on the side of the bed?: 3  Moving to and from a bed to a chair (including a wheelchair)?: 3  Need to walk in hospital room?: 3  Climbing 3-5 steps with a railing?: 2  Basic Mobility Total Score: 17       Treatment & Education:  Discussed pt.'s progress, goals, and POC.    Patient left up in chair with all lines intact and call button in reach..    GOALS:   Multidisciplinary Problems       Physical Therapy Goals          Problem: Physical Therapy    Goal Priority Disciplines Outcome Interventions   Physical Therapy Goal     PT, PT/OT Progressing    Description: Goals to be met by: 2025     Patient will increase functional independence with mobility by performin. Sit to stand transfer with Supervision  2. Bed to chair transfer with Stand-by Assistance using LRAD  3. Gait  x 150 feet with Stand-by Assistance using LRAD..   4. Ascend/descend flight of stairs with bilateral Handrails Contact Guard Assistance using No Assistive Device.                          Time Tracking:     PT Received On: 24  PT Start Time: 1118     PT Stop Time: 1133  PT Total Time (min): 15 min     Billable Minutes: Gait Training 15    Treatment Type: Treatment  PT/PTA: PT     Number of PTA visits since last PT visit: 0     2024

## 2024-12-11 NOTE — ASSESSMENT & PLAN NOTE
Stable  Was going to have back surgery this week but no cancelled.  -- ok for home meds  -- pt/ot

## 2024-12-11 NOTE — PROGRESS NOTES
"[facility] Medicine  Progress Note    Patient Name: Selwyn Crowder  MRN: [id number]  Patient Class: IP- Inpatient   Admission Date: 12/9/2024  Length of Stay: 2 days  Attending Physician: Ludwig Gray MD  Primary Care Provider: Melani, Primary Doctor        Subjective     Principal Problem:Cellulitis of right lower extremity        HPI:  53 yo F with PMHx HTN and chronic back pain with lumbar stenosis who presents to the ED from primary care clinic for leg swelling and fevers x a few days. Pt. Scheduled to undergo laminectomy procedure for her back pain 12/11. She was seen in medicine clinic for preoperative clearance today and vital check noted the patient tachycardic to 129, temp 101.7. Exam noted "bleeding from oval shaped wound approx. 3/4 inch in on R leg. The calf was red, swollen erythematous and tender to touch". Pt. Was referred to ED for further work-up.     Pt. Reports noticing the wound about 4 days ago with some associated fevers/chills starting last night. She does not recall any trauma to the area, but she reports that she has had a few falls over the last few weeks going up her steps at home, so she is not sure.    Overview/Hospital Course:  See problem list    Interval History:   Symptoms:    Same malaise but no chills this am.  Diet:   Poor intake.    Activity level:  Impaired due to pain.   Pain: leg pain controlled, requiring pain medication.      Review of Systems   Constitutional:  Positive for fever. Negative for chills.   Respiratory:  Negative for shortness of breath.    Cardiovascular:  Negative for chest pain.   Gastrointestinal:  Negative for abdominal pain.     Objective:     Vital Signs (Most Recent):  Temp: 98.5 °F (36.9 °C) (12/11/24 1110)  Pulse: 86 (12/11/24 1110)  Resp: 17 (12/11/24 1110)  BP: (!) 102/56 (12/11/24 1110)  SpO2: 99 % (12/11/24 1110) Vital Signs (24h Range):  Temp:  [98.5 °F (36.9 °C)-102.3 °F (39.1 °C)] 98.5 °F (36.9 " "°C)  Pulse:  [] 86  Resp:  [17-20] 17  SpO2:  [93 %-99 %] 99 %  BP: (102-152)/(56-82) 102/56     Weight: 119.2 kg (262 lb 12.6 oz)  Body mass index is 43.73 kg/m².    Intake/Output Summary (Last 24 hours) at 12/11/2024 1329  Last data filed at 12/11/2024 0644  Gross per 24 hour   Intake 240 ml   Output 425 ml   Net -185 ml         Physical Exam  Constitutional:       General: She is in acute distress.   Cardiovascular:      Rate and Rhythm: Normal rate and regular rhythm.   Pulmonary:      Effort: No respiratory distress.      Breath sounds: No wheezing.   Abdominal:      General: There is no distension.      Tenderness: There is no abdominal tenderness.   Musculoskeletal:      Right lower leg: Edema (R>L size.  warmth mid calf to above knee with mild induration.  2x2 ulcer back of knee with no exudate) present.      Left lower leg: Edema (mild edema) present.   Neurological:      Mental Status: She is alert and oriented to person, place, and time.   Psychiatric:         Behavior: Behavior normal.             Significant Labs: All pertinent labs within the past 24 hours have been reviewed.    Significant Imaging: I have reviewed all pertinent imaging results/findings within the past 24 hours.    Assessment and Plan     * Cellulitis of right lower extremity  CT soft tissue shows "Skin thickening and diffuse subcutaneous edema throughout the lower leg and distal thigh, nonspecific, may represent cellulitis".  US Soft tissue found no abscesses.  IV clindamycin and bactrim both started in ED.  -- 12/10 due to impressive sepsis will c/w IV clinda and ceftriaxone IV; due to persistent fever clinda changed to vanc 12/11  --   Wound care consulted for treatment of open wound.       Post-nasal drip  Flonase ordered.      Elevated brain natriuretic peptide (BNP) level   in obese patient.  EKG has no pathologic tracings.  TTE WNL.  Could be due to sepsis.        Chronic hepatitis C virus infection  She is aware.  " "She has had it for 25 years from a tattoo and was treated but learned recently that it has recurred.  US 12/10/24: Echogenic liver parenchyma, which could reflect steatosis and/or chronic liver disease. + Splenomegaly   TTE ok.  HAV and HBV negative.    Their most current Na-MELD is listed below.  MELD 3.0: 15 at 12/11/2024  6:29 AM  MELD-Na: 8 at 12/11/2024  6:29 AM  Calculated from:  Serum Creatinine: 0.8 mg/dL (Using min of 1 mg/dL) at 12/11/2024  6:29 AM  Serum Sodium: 132 mmol/L at 12/11/2024  6:29 AM  Total Bilirubin: 1 mg/dL at 12/11/2024  6:29 AM  Serum Albumin: 2.2 g/dL at 12/11/2024  6:29 AM  INR(ratio): 1.2 at 12/10/2024 10:11 AM  Age at listing (hypothetical): 54 years  Sex: Female at 12/11/2024  6:29 AM    - monitor labs  -- viral panel ordered, afp  -- needs liver f/u (referral made)    Sepsis  Admission patient had fever and tachycardia.  This patient does have evidence of infective focus  My overall impression is sepsis.  Source: Skin and Soft Tissue (location leg)  Antibiotics given-   Antibiotics (72h ago, onward)      Start     Stop Route Frequency Ordered    12/11/24 1400  vancomycin 1,250 mg in 0.9% NaCl 250 mL IVPB (admixture device)         -- IV Every 12 hours (non-standard times) 12/11/24 0022    12/11/24 0107  vancomycin - pharmacy to dose  (vancomycin IVPB (PEDS and ADULTS))        Placed in "And" Linked Group    -- IV pharmacy to manage frequency 12/11/24 0007    12/10/24 1515  cefTRIAXone injection 2 g         -- IV Daily 12/10/24 1510          Source control achieved by: abx    Thrombocytopenia  The likely etiology of thrombocytopenia is sepsis and liver disease. The patients 3 most recent labs are listed below.  Recent Labs     12/10/24  0257 12/11/24  0036 12/11/24  0629   PLT 98* 108* 108*       Plan  - Will transfuse if platelet count is <10k.  - monitor CBC      Hyponatremia  Hyponatremia is likely due to Dehydration/hypovolemia and Cirrhosis. The patient's most recent sodium " "results are listed below.  Recent Labs     12/10/24  0257 12/11/24  0036 12/11/24  0629   * 133* 132*       Plan  - treat underlying issue  - Monitor sodium       Morbid obesity  Body mass index is 43.73 kg/m². Morbid obesity complicates all aspects of disease management from diagnostic modalities to treatment. Weight loss encouraged and health benefits explained to patient.         Anemia  Anemia is likely due to chronic disease due to Chronic liver disease. Most recent hemoglobin and hematocrit are listed below.  Recent Labs     12/10/24  0257 12/11/24  0036 12/11/24  0629   HGB 10.7* 10.8* 10.5*   HCT 34.5* 33.3* 31.7*       Plan  - Monitor serial CBC: Daily  - Transfuse PRBC if patient becomes hemodynamically unstable, symptomatic or H/H drops below 7/21.      Debility  Pt. Reports a few falls over the last few weeks, lives upstairs. May have led to trauma which caused current issue.   -- PT/OT assessment ordered.  Might need Home Health       HTN (hypertension)  Takes amlodipine prn at home  -- given sepsis will hold BP meds and monitor vitals    Acute deep vein thrombosis (DVT) of proximal vein of right lower extremity  Admission Pt. With R leg swelling and tenderness, U/S shows extensive thrombus "Right lower extremity occlusive thrombus involving the popliteal vein, both posterior tibial veins, both anterior tibial veins, and both peroneal veins"  -Heparin gtt started for treatment, plan to transition to PO AC once confident the cellulitis will not mature to an abscess and need drainage        Osteoarthritis of spine with radiculopathy, lumbar region  Stable  Was going to have back surgery this week but no cancelled.  -- ok for home meds  -- pt/ot        VTE Risk Mitigation (From admission, onward)           Ordered     heparin 25,000 units in dextrose 5% (100 units/ml) IV bolus from bag HIGH INTENSITY nomogram - [facility]  As needed (PRN)        Question:  Heparin Infusion Adjustment (DO NOT MODIFY ANSWER)  " Answer:  [email or web address]\Images\Pharmacy\HeparinInfusions\heparin HIGH INTENSITY nomogram for [facility] [id number]    12/09/24 1934     heparin 25,000 units in dextrose 5% (100 units/ml) IV bolus from bag HIGH INTENSITY nomogram - [facility]  As needed (PRN)        Question:  Heparin Infusion Adjustment (DO NOT MODIFY ANSWER)  Answer:  [email or web address]\[email or web address]\Images\Pharmacy\HeparinInfusions\heparin HIGH INTENSITY nomogram for [facility] [id number].pdf    12/09/24 1934     IP VTE HIGH RISK PATIENT  Once         12/09/24 2322     Place sequential compression device  Until discontinued         12/09/24 2322     heparin 25,000 units in dextrose 5% 250 mL (100 units/mL) infusion HIGH INTENSITY nomogram - [facility]  Continuous        Question:  Begin at (units/kg/hr)  Answer:  18    12/09/24 1934                    Discharge Planning   TYSHAWN: 12/13/2024     Code Status: Full Code   Medical Readiness for Discharge Date:                    Please place Justification for DME        Ludwig Gray MD  Department of Hospital Medicine   [facility] - Internal Medicine Telemetry

## 2024-12-11 NOTE — ASSESSMENT & PLAN NOTE
Body mass index is 43.73 kg/m². Morbid obesity complicates all aspects of disease management from diagnostic modalities to treatment. Weight loss encouraged and health benefits explained to patient.

## 2024-12-11 NOTE — PT/OT/SLP PROGRESS
Occupational Therapy   Treatment    Name: Selwyn Crowder  MRN: [id number]  Admitting Diagnosis:  Cellulitis of right lower extremity       Recommendations:     Discharge Recommendations: Low Intensity Therapy  Discharge Equipment Recommendations:  walker, rolling, 3-in-1 commode, bath bench, wheelchair  Barriers to discharge:  Inaccessible home environment  Nursing staff Recommendations: assist x1 t/f to bathroom with a RW  Assessment:     Selwyn Crowder is a 54 y.o. female with a medical diagnosis of Cellulitis of right lower extremity.  She presents with generalized weakness but with good motivation for bathroom ADLs during therapy session today. Performance deficits affecting function are weakness, impaired endurance, decreased ROM, decreased coordination, impaired self care skills, impaired functional mobility, gait instability, impaired balance, pain, edema, impaired skin.     Rehab Prognosis:  Good; patient would benefit from acute skilled OT services to address these deficits and reach maximum level of function.       Plan:     Patient to be seen 3 x/week to address the above listed problems via self-care/home management, therapeutic activities, therapeutic exercises, neuromuscular re-education, wheelchair management/training  Plan of Care Expires: 12/17/24  Plan of Care Reviewed with: patient    Subjective     Chief Complaint: Feeling unclean in her lower body  Patient/Family Comments/goals: to freshen up.   Pain/Comfort:  Pain Rating 1: other (see comments) (stated she just got pain meds)  Pain Addressed 1: Pre-medicate for activity    Objective:     Communicated with: RN prior to session.  Patient found up in chair with peripheral IV, telemetry, PureWick upon OT entry to room.    General Precautions: Standard, fall    Orthopedic Precautions:N/A  Braces: N/A  Respiratory Status: Room air     Occupational Performance:   Noted hemorrhaging from her R LE's popliteal fossa. Applied pressure and bandaged  with gauze square and coban wrap.     Functional Mobility/Transfers:  Patient completed Sit <> Stand Transfer with stand by assistance  with  rolling walker   Patient completed Toilet Transfer Step Transfer technique with stand by assistance with  rolling walker  Functional Mobility: CGA t/f from chair<>bathroom, R LE partially weightbearing due to pain. Min cues for RW positioning and pt's body in space to navigate along with the IV pole.     Activities of Daily Living:  Bathing: stand by assistance seated with washcloth and sinkside in reach  Upper Body Dressing: stand by assistance to don shirt  Lower Body Dressing: minimum assistance to thread socks, underwear, and pants  Toileting: stand by assistance perineal hygiene over the commode. Pt able to assist self in replacing purewick while reclined in the chair.     [facility] 6 Click ADL: 20    Treatment & Education:  Pt educated on the following topics:  OT 's plan of care and purpose of visit, ADLs, importance of increased activity in hospital setting, transfer training, body mechanics, assistive device, modifications/compensatory strategies, sequencing, safety precautions, fall prevention, energy conservation techniques, and to call for assistance with call button  Understanding was verbalized, however additional teaching warranted.     Patient left up in chair with all lines intact, call button in reach, and PCT present    GOALS:   Multidisciplinary Problems       Occupational Therapy Goals          Problem: Occupational Therapy    Goal Priority Disciplines Outcome Interventions   Occupational Therapy Goal     OT, PT/OT Progressing    Description: Goals to be met by: 7 days 12/17/24     Patient will increase functional independence with ADLs by performing:    Pt to complete UE dressing with set-up  Pt to complete LE dressing with MIN A with AE as needed   Pt to complete standing g/h skills with SBA  Pt to complete toilet t/f with SBA.  met 12/11/2024  Pt to   complete toileting with SBA. Met 12/11/2024   Pt to tolerated OOB to chair x 3-4 hours daily to increase endurance for functional activity                          Time Tracking:     OT Date of Treatment: 12/11/24  OT Start Time: 1515  OT Stop Time: 1548  OT Total Time (min): 33 min    Billable Minutes:Self Care/Home Management 17  Therapeutic Activity 16    OT/CLAUDIA: OT          12/11/2024

## 2024-12-11 NOTE — ASSESSMENT & PLAN NOTE
"Admission patient had fever and tachycardia.  This patient does have evidence of infective focus  My overall impression is sepsis.  Source: Skin and Soft Tissue (location leg)  Antibiotics given-   Antibiotics (72h ago, onward)    Start     Stop Route Frequency Ordered    12/11/24 1400  vancomycin 1,250 mg in 0.9% NaCl 250 mL IVPB (admixture device)         -- IV Every 12 hours (non-standard times) 12/11/24 0022    12/11/24 0107  vancomycin - pharmacy to dose  (vancomycin IVPB (PEDS and ADULTS))        Placed in "And" Linked Group    -- IV pharmacy to manage frequency 12/11/24 0007    12/10/24 1515  cefTRIAXone injection 2 g         -- IV Daily 12/10/24 1510        Source control achieved by: abx  "

## 2024-12-11 NOTE — SUBJECTIVE & OBJECTIVE
Interval History:   Symptoms:    Same malaise but no chills this am.  Diet:   Poor intake.    Activity level:  Impaired due to pain.   Pain: leg pain controlled, requiring pain medication.      Review of Systems   Constitutional:  Positive for fever. Negative for chills.   Respiratory:  Negative for shortness of breath.    Cardiovascular:  Negative for chest pain.   Gastrointestinal:  Negative for abdominal pain.     Objective:     Vital Signs (Most Recent):  Temp: 98.5 °F (36.9 °C) (12/11/24 1110)  Pulse: 86 (12/11/24 1110)  Resp: 17 (12/11/24 1110)  BP: (!) 102/56 (12/11/24 1110)  SpO2: 99 % (12/11/24 1110) Vital Signs (24h Range):  Temp:  [98.5 °F (36.9 °C)-102.3 °F (39.1 °C)] 98.5 °F (36.9 °C)  Pulse:  [] 86  Resp:  [17-20] 17  SpO2:  [93 %-99 %] 99 %  BP: (102-152)/(56-82) 102/56     Weight: 119.2 kg (262 lb 12.6 oz)  Body mass index is 43.73 kg/m².    Intake/Output Summary (Last 24 hours) at 12/11/2024 1329  Last data filed at 12/11/2024 0644  Gross per 24 hour   Intake 240 ml   Output 425 ml   Net -185 ml         Physical Exam  Constitutional:       General: She is in acute distress.   Cardiovascular:      Rate and Rhythm: Normal rate and regular rhythm.   Pulmonary:      Effort: No respiratory distress.      Breath sounds: No wheezing.   Abdominal:      General: There is no distension.      Tenderness: There is no abdominal tenderness.   Musculoskeletal:      Right lower leg: Edema (R>L size.  warmth mid calf to above knee with mild induration.  2x2 ulcer back of knee with no exudate) present.      Left lower leg: Edema (mild edema) present.   Neurological:      Mental Status: She is alert and oriented to person, place, and time.   Psychiatric:         Behavior: Behavior normal.             Significant Labs: All pertinent labs within the past 24 hours have been reviewed.    Significant Imaging: I have reviewed all pertinent imaging results/findings within the past 24 hours.

## 2024-12-12 LAB
ALBUMIN SERPL BCP-MCNC: 2.1 G/DL (ref 3.5–5.2)
ALP SERPL-CCNC: 76 U/L (ref 40–150)
ALT SERPL W/O P-5'-P-CCNC: 29 U/L (ref 10–44)
ANION GAP SERPL CALC-SCNC: 5 MMOL/L (ref 8–16)
APTT PPP: 36.7 SEC (ref 21–32)
APTT PPP: 75.2 SEC (ref 21–32)
APTT PPP: >150 SEC (ref 21–32)
AST SERPL-CCNC: 39 U/L (ref 10–40)
BASOPHILS # BLD AUTO: 0.02 K/UL (ref 0–0.2)
BASOPHILS NFR BLD: 0.3 % (ref 0–1.9)
BILIRUB SERPL-MCNC: 0.8 MG/DL (ref 0.1–1)
BUN SERPL-MCNC: 10 MG/DL (ref 6–20)
CALCIUM SERPL-MCNC: 7.8 MG/DL (ref 8.7–10.5)
CHLORIDE SERPL-SCNC: 107 MMOL/L (ref 95–110)
CO2 SERPL-SCNC: 21 MMOL/L (ref 23–29)
CREAT SERPL-MCNC: 0.8 MG/DL (ref 0.5–1.4)
DIFFERENTIAL METHOD BLD: ABNORMAL
EOSINOPHIL # BLD AUTO: 0.1 K/UL (ref 0–0.5)
EOSINOPHIL NFR BLD: 1.3 % (ref 0–8)
ERYTHROCYTE [DISTWIDTH] IN BLOOD BY AUTOMATED COUNT: 12.6 % (ref 11.5–14.5)
EST. GFR  (NO RACE VARIABLE): >60 ML/MIN/1.73 M^2
GLUCOSE SERPL-MCNC: 118 MG/DL (ref 70–110)
HCT VFR BLD AUTO: 32.3 % (ref 37–48.5)
HGB BLD-MCNC: 10.9 G/DL (ref 12–16)
IMM GRANULOCYTES # BLD AUTO: 0.05 K/UL (ref 0–0.04)
IMM GRANULOCYTES NFR BLD AUTO: 0.7 % (ref 0–0.5)
LYMPHOCYTES # BLD AUTO: 1.9 K/UL (ref 1–4.8)
LYMPHOCYTES NFR BLD: 28 % (ref 18–48)
MCH RBC QN AUTO: 30.9 PG (ref 27–31)
MCHC RBC AUTO-ENTMCNC: 33.7 G/DL (ref 32–36)
MCV RBC AUTO: 92 FL (ref 82–98)
MONOCYTES # BLD AUTO: 0.7 K/UL (ref 0.3–1)
MONOCYTES NFR BLD: 9.9 % (ref 4–15)
NEUTROPHILS # BLD AUTO: 4.1 K/UL (ref 1.8–7.7)
NEUTROPHILS NFR BLD: 59.8 % (ref 38–73)
NRBC BLD-RTO: 0 /100 WBC
PLATELET # BLD AUTO: 110 K/UL (ref 150–450)
PMV BLD AUTO: 10.2 FL (ref 9.2–12.9)
POTASSIUM SERPL-SCNC: 4.2 MMOL/L (ref 3.5–5.1)
PROT SERPL-MCNC: 5.6 G/DL (ref 6–8.4)
RBC # BLD AUTO: 3.53 M/UL (ref 4–5.4)
SODIUM SERPL-SCNC: 133 MMOL/L (ref 136–145)
VANCOMYCIN TROUGH SERPL-MCNC: 15 UG/ML (ref 10–22)
WBC # BLD AUTO: 6.79 K/UL (ref 3.9–12.7)

## 2024-12-12 PROCEDURE — 63600175 PHARM REV CODE 636 W HCPCS: Performed by: HOSPITALIST

## 2024-12-12 PROCEDURE — 80202 ASSAY OF VANCOMYCIN: CPT | Performed by: HOSPITALIST

## 2024-12-12 PROCEDURE — 85730 THROMBOPLASTIN TIME PARTIAL: CPT | Performed by: HOSPITALIST

## 2024-12-12 PROCEDURE — 85730 THROMBOPLASTIN TIME PARTIAL: CPT | Mod: 91 | Performed by: HOSPITALIST

## 2024-12-12 PROCEDURE — 97530 THERAPEUTIC ACTIVITIES: CPT | Mod: CO

## 2024-12-12 PROCEDURE — 36415 COLL VENOUS BLD VENIPUNCTURE: CPT | Mod: XB | Performed by: STUDENT IN AN ORGANIZED HEALTH CARE EDUCATION/TRAINING PROGRAM

## 2024-12-12 PROCEDURE — 97116 GAIT TRAINING THERAPY: CPT | Mod: CQ

## 2024-12-12 PROCEDURE — 21400001 HC TELEMETRY ROOM

## 2024-12-12 PROCEDURE — 25000003 PHARM REV CODE 250: Performed by: HOSPITALIST

## 2024-12-12 PROCEDURE — 97110 THERAPEUTIC EXERCISES: CPT | Mod: CQ

## 2024-12-12 PROCEDURE — 85730 THROMBOPLASTIN TIME PARTIAL: CPT | Mod: 91 | Performed by: STUDENT IN AN ORGANIZED HEALTH CARE EDUCATION/TRAINING PROGRAM

## 2024-12-12 PROCEDURE — 25000003 PHARM REV CODE 250: Performed by: STUDENT IN AN ORGANIZED HEALTH CARE EDUCATION/TRAINING PROGRAM

## 2024-12-12 PROCEDURE — 36415 COLL VENOUS BLD VENIPUNCTURE: CPT | Mod: XB | Performed by: HOSPITALIST

## 2024-12-12 PROCEDURE — 97535 SELF CARE MNGMENT TRAINING: CPT | Mod: CO

## 2024-12-12 PROCEDURE — 85025 COMPLETE CBC W/AUTO DIFF WBC: CPT | Performed by: HOSPITALIST

## 2024-12-12 PROCEDURE — 80053 COMPREHEN METABOLIC PANEL: CPT | Performed by: HOSPITALIST

## 2024-12-12 PROCEDURE — 63600175 PHARM REV CODE 636 W HCPCS: Performed by: STUDENT IN AN ORGANIZED HEALTH CARE EDUCATION/TRAINING PROGRAM

## 2024-12-12 PROCEDURE — 11000001 HC ACUTE MED/SURG PRIVATE ROOM

## 2024-12-12 RX ORDER — BENZONATATE 100 MG/1
100 CAPSULE ORAL 3 TIMES DAILY PRN
Status: DISCONTINUED | OUTPATIENT
Start: 2024-12-12 | End: 2024-12-14 | Stop reason: HOSPADM

## 2024-12-12 RX ADMIN — VANCOMYCIN HYDROCHLORIDE 1250 MG: 1.25 INJECTION, POWDER, LYOPHILIZED, FOR SOLUTION INTRAVENOUS at 02:12

## 2024-12-12 RX ADMIN — FLUTICASONE PROPIONATE 100 MCG: 50 SPRAY, METERED NASAL at 09:12

## 2024-12-12 RX ADMIN — BENZONATATE 100 MG: 100 CAPSULE ORAL at 04:12

## 2024-12-12 RX ADMIN — DIPHENHYDRAMINE HYDROCHLORIDE 50 MG: 25 CAPSULE ORAL at 03:12

## 2024-12-12 RX ADMIN — METHOCARBAMOL 500 MG: 500 TABLET ORAL at 08:12

## 2024-12-12 RX ADMIN — HYDROCODONE BITARTRATE AND ACETAMINOPHEN 1 TABLET: 5; 325 TABLET ORAL at 09:12

## 2024-12-12 RX ADMIN — GABAPENTIN 400 MG: 300 CAPSULE ORAL at 08:12

## 2024-12-12 RX ADMIN — VANCOMYCIN HYDROCHLORIDE 1250 MG: 1.25 INJECTION, POWDER, LYOPHILIZED, FOR SOLUTION INTRAVENOUS at 03:12

## 2024-12-12 RX ADMIN — METHOCARBAMOL 500 MG: 500 TABLET ORAL at 03:12

## 2024-12-12 RX ADMIN — METHOCARBAMOL 500 MG: 500 TABLET ORAL at 09:12

## 2024-12-12 RX ADMIN — HEPARIN SODIUM 17 UNITS/KG/HR: 10000 INJECTION, SOLUTION INTRAVENOUS at 07:12

## 2024-12-12 RX ADMIN — CEFTRIAXONE 2 G: 2 INJECTION, POWDER, FOR SOLUTION INTRAMUSCULAR; INTRAVENOUS at 09:12

## 2024-12-12 RX ADMIN — DIPHENHYDRAMINE HYDROCHLORIDE 50 MG: 25 CAPSULE ORAL at 09:12

## 2024-12-12 RX ADMIN — HYDROCODONE BITARTRATE AND ACETAMINOPHEN 1 TABLET: 5; 325 TABLET ORAL at 03:12

## 2024-12-12 RX ADMIN — GABAPENTIN 400 MG: 300 CAPSULE ORAL at 03:12

## 2024-12-12 RX ADMIN — GABAPENTIN 400 MG: 300 CAPSULE ORAL at 09:12

## 2024-12-12 NOTE — PT/OT/SLP PROGRESS
Occupational Therapy   Treatment    Name: Selwyn Crowder  MRN: [id number]  Admitting Diagnosis:  Cellulitis of right lower extremity       Recommendations:     Discharge Recommendations: Low Intensity Therapy  Discharge Equipment Recommendations:  walker, rolling, 3-in-1 commode, bath bench, wheelchair  Barriers to discharge:  Inaccessible home environment    Assessment:     Selwyn Crowder is a 54 y.o. female with a medical diagnosis of Cellulitis of right lower extremity.  She presents with the fallowing performance deficits affecting function are weakness, impaired endurance, impaired self care skills, impaired functional mobility, gait instability, impaired balance, decreased coordination, decreased lower extremity function, pain. Patient agreeable to tx session, patient is demonstrating progress with functional transfers, bed mobility, and self-care task, however; patient continues to have limited activity tolerance due to pain and weakness from recent hospitalization. Patient would benefit from Low intensity therapy intervention to address over all functional decline with mobility task, endurance, and ADLs in order to return to PLOF.     Rehab Prognosis:  Good; patient would benefit from acute skilled OT services to address these deficits and reach maximum level of function.       Plan:     Patient to be seen 3 x/week to address the above listed problems via self-care/home management, therapeutic activities, therapeutic exercises, neuromuscular re-education, wheelchair management/training  Plan of Care Expires: 12/17/24  Plan of Care Reviewed with: patient    Subjective     Chief Complaint: pain   Patient/Family Comments/goals: to return to PLOF   Pain/Comfort:  Pain Rating 1:  (patient did not rate)  Pain Addressed 1: Reposition, Distraction, Nurse notified  Pain Rating Post-Intervention 1:  (patient did not rate)    Objective:     Communicated with: Nurse  prior to session.  Patient found up in chair  with telemetry, peripheral IV upon OT entry to room.  A client care conference was completed by the OTR and the SELBY prior to treatment by the SELBY to discuss the patient's POC and current status.   Co-treated with PT due to patient complexity deficits requiring two skilled therapist to appropriately and safely mobilized patient while facilitating functional task in addition to accommodating for patient's activity tolerance.    General Precautions: Standard, fall    Orthopedic Precautions:N/A  Braces: N/A  Respiratory Status: Room air     Occupational Performance:      Functional Mobility/Transfers:  Patient completed Sit <> Stand Transfer with stand by assistance and contact guard assistance  with  rolling walker   Patient completed Toilet Transfer Step Transfer technique with contact guard assistance with  rolling walker and bedside commode  Functional Mobility: Patient ambulated 8ft+8ft from bedside chair to the bathroom with CGA with RW, patient continues to have difficulty bearing weight on R LE due to pain and edema     Activities of Daily Living:  Grooming: Setup A to wash hands standing up at the sink   Lower Body Dressing: Patient was (I) to deandre/doff under-wear in partial standing position   Toileting: Patient (I) for pericare hygiene in standing/sitting       [facility] 6 Click ADL: 21    Treatment & Education:  Discussed OT POC and progress  Educated patient on the importance to continue to perform exercises in order to reduce stiffness and promote joint mobility and blood flow  Addressed patient's questions and concerns within SELBY scope of practice      Patient left up in chair with all lines intact, call button in reach, and nurse notified    GOALS:   Multidisciplinary Problems       Occupational Therapy Goals          Problem: Occupational Therapy    Goal Priority Disciplines Outcome Interventions   Occupational Therapy Goal     OT, PT/OT Progressing    Description: Goals to be met by: 7 days 12/17/24      Patient will increase functional independence with ADLs by performing:    Pt to complete UE dressing with set-up  Pt to complete LE dressing with MIN A with AE as needed   Pt to complete standing g/h skills with SBA  Pt to complete toilet t/f with SBA.  met 12/11/2024  Pt to  complete toileting with SBA. Met 12/11/2024   Pt to tolerated OOB to chair x 3-4 hours daily to increase endurance for functional activity                          Time Tracking:     OT Date of Treatment: 12/12/24  OT Start Time: 1429  OT Stop Time: 1506  OT Total Time (min): 37 min    Billable Minutes:Self Care/Home Management 20  Therapeutic Activity 17    OT/CLAUDIA: CLAUDIA     Number of CLAUDIA visits since last OT visit: 1    12/12/2024

## 2024-12-12 NOTE — CONSULTS
[facility] - Internal Medicine Telemetry    Wound Care     Patient Name:  Selwyn Crowder  MRN:  [id number]  Date: 12/12/2024  Diagnosis: Cellulitis of right lower extremity     History:  Past Medical History:   Diagnosis Date    Hepatitis C antibody positive in blood 2000    RNA POSITIVE    Osteoarthritis of spine with radiculopathy, lumbar region 09/26/2024     Social History     Socioeconomic History    Marital status:    Tobacco Use    Smoking status: Never    Smokeless tobacco: Never   Substance and Sexual Activity    Alcohol use: Yes    Drug use: Never    Sexual activity: Not Currently     Social Drivers of Health     Financial Resource Strain: Medium Risk (12/11/2024)    Overall Financial Resource Strain (CARDIA)     Difficulty of Paying Living Expenses: Somewhat hard   Food Insecurity: Food Insecurity Present (12/11/2024)    Hunger Vital Sign     Worried About Running Out of Food in the Last Year: Sometimes true     Ran Out of Food in the Last Year: Never true   Transportation Needs: Patient Declined (12/11/2024)    TRANSPORTATION NEEDS     Transportation : Patient declined   Physical Activity: Insufficiently Active (12/6/2024)    Exercise Vital Sign     Days of Exercise per Week: 2 days     Minutes of Exercise per Session: 30 min   Stress: Stress Concern Present (12/11/2024)    [employer] - Occupational Stress Questionnaire     Feeling of Stress : Rather much   Housing Stability: Unknown (12/11/2024)    Housing Stability Vital Sign     Unable to Pay for Housing in the Last Year: Patient declined     Homeless in the Last Year: No     Precautions:  Allergies as of 12/09/2024    (No Known Allergies)       WOC Assessment Details / Treatment:    Patient seen for wound care: New Consult   Chart reviewed for this encounter.   Labs:   WBC (K/uL)   Date Value   12/12/2024 6.79   12/11/2024 7.53     Glucose (mg/dL)   Date Value   12/12/2024 118 (H)   12/11/2024 112 (H)     Albumin  "(g/dL)   Date Value   12/12/2024 2.1 (L)   12/11/2024 2.2 (L)     Karl Score: 19  Nutrition sub-score: 3  Wound is POA    Narrative:  Pt seen for WC consultation  and agreed to assessment  Chart reviewed for this encounter.   See Flow Sheet for additional documentation and media.    Pt sitting in bedside chair, able to stand independently for assessment revealing SS drainage to pillow that was under her legs while sitting, cleansed w/Vashe and 4x4 gauze revealing moist red, pink granulation tissue with no erythema noted, dry, clean intact periwound. Pt reports she has"blood clots" in her RLE, but cannot explain the wound or how it occurred. Applied Aquacel Ag to wound bed, covered with foam border.      RECOMMENDATIONS:  Bedside nurse assess for acute changes (purulence, increased redness/swelling, increased drainage, malodor, increased pain, pallor, necrosis) please contact physician on any acute changes.    Nutrition consult  Change dressing q2d and PRN strikethrough drainage.     Discussed POC with patient and primary nurse.   See EMR for orders & patient education.    Bedside nursing to continue care & monitoring.  Bedside nursing to maintain pressure injury prevention interventions    Recommendations made to primary team for above plan via secure chat.     Thank you for the consult. Wound Care will continue to follow.   12/12/24 1300   WOCN Assessment   WOCN Total Time (mins) 20   Visit Date 12/12/24   Visit Time 1300   Consult Type New   WOCN Speciality Wound   Wound cellulitis   Intervention chart review;assessed;changed;applied;orders   Teaching on-going        Wound 12/10/24 0035 Ulceration Right Popliteal   Date First Assessed/Time First Assessed: 12/10/24 0035   Present on Original Admission: Yes  Primary Wound Type: Ulceration  Side: Right  Location: Popliteal  Is this injury device related?: No   Wound Image    Dressing Appearance Open to air   Drainage Amount Small   Drainage Characteristics/Odor " Serosanguineous;No odor   Appearance Pink;Red;Moist;Yellow   Tissue loss description Full thickness   Red (%), Wound Tissue Color 95 %   Yellow (%), Wound Tissue Color 5 %   Periwound Area Intact;Dry;Edematous   Wound Edges Defined   Wound Length (cm) 0.7 cm   Wound Width (cm) 1.8 cm   Wound Depth (cm) 0.4 cm   Wound Volume (cm^3) 0.504 cm^3   Wound Surface Area (cm^2) 1.26 cm^2   Care Cleansed with:;Antimicrobial agent   Dressing Applied;Silver;Hydrofiber;Foam   Periwound Care Dry periwound area maintained   Dressing Change Due 12/14/24

## 2024-12-12 NOTE — PROGRESS NOTES
Pharmacokinetic Assessment Follow Up: IV Vancomycin    Vancomycin serum concentration assessment(s):    The trough level was drawn ~11 hours post previous dose on Q12h frequency   The measurement is within the desired definitive target range of 10 to 20 mcg/mL.  Continue regimen to Vancomycin 1250 mg IV every 12 hours  next serum trough concentration measured at 1300 prior to dose on 12/14    Drug levels (last 3 results):  Recent Labs   Lab Result Units 12/12/24  1257   Vancomycin-Trough ug/mL 15.0       Pharmacy will continue to follow and monitor vancomycin.    Please contact pharmacy at extension [phone] for questions regarding this assessment.    Thank you for the consult,   Nikia Zepeda       Patient brief summary:  Selwyn Crowder is a 54 y.o. female initiated on antimicrobial therapy with IV Vancomycin for treatment of skin & soft tissue infection    The patient's current regimen is Vancomycin 2500 mg x1 then Vancomycin 1250 mg IV Q12h    Drug Allergies:   Review of patient's allergies indicates:  No Known Allergies    Actual Body Weight:   119.2kg    Renal Function:   Estimated Creatinine Clearance: 101.5 mL/min (based on SCr of 0.8 mg/dL).,       CBC (last 72 hours):  Recent Labs   Lab Result Units 12/09/24  2039 12/10/24  0257 12/11/24  0036 12/11/24  0629 12/12/24  0529   WBC K/uL 7.73 6.98 8.02 7.53 6.79   Hemoglobin g/dL 9.5* 10.7* 10.8* 10.5* 10.9*   Hemoglobin A1C %  --   --   --  5.9*  --    Hematocrit % 30.6* 34.5* 33.3* 31.7* 32.3*   Platelets K/uL 101* 98* 108* 108* 110*   Gran % % 68.2 77.2* 69.3 67.2 59.8   Lymph % % 22.5 15.3* 22.1 24.6 28.0   Mono % % 8.0 6.7 7.6 6.9 9.9   Eosinophil % % 0.3 0.1 0.2 0.4 1.3   Basophil % % 0.4 0.3 0.2 0.1 0.3   Differential Method  Automated Automated Automated Automated Automated       Metabolic Panel (last 72 hours):  Recent Labs   Lab Result Units 12/09/24  1435 12/10/24  0257 12/11/24  0036 12/11/24  0629 12/12/24  0529   Sodium mmol/L 138 134* 133*  132* 133*   Potassium mmol/L 4.4 4.3 4.4 4.7 4.2   Chloride mmol/L 107 106 105 105 107   CO2 mmol/L 23 20* 21* 20* 21*   Glucose mg/dL 106 206* 130* 112* 118*   BUN mg/dL 14 11 12 11 10   Creatinine mg/dL 0.8 0.8 0.9 0.8 0.8   Albumin g/dL 2.8* 2.4*  --  2.2* 2.1*   Total Bilirubin mg/dL 0.9 2.2*  --  1.0 0.8   Alkaline Phosphatase U/L 93 97  --  83 76   AST U/L 56* 60*  --  43* 39   ALT U/L 48* 47*  --  30 29       Vancomycin Administrations:  vancomycin given in the last 96 hours                     vancomycin 1,250 mg in 0.9% NaCl 250 mL IVPB (admixture device) (mg) 1,250 mg New Bag 12/12/24 0208     1,250 mg New Bag 12/11/24 1344    vancomycin (VANCOCIN) 2,500 mg in 0.9% NaCl 500 mL IVPB (mg) 2,500 mg New Bag 12/11/24 0109                    Microbiologic Results:  Microbiology Results (last 7 days)       Procedure Component Value Units Date/Time    Blood culture [[id number]] Collected: 12/11/24 0038    Order Status: Completed Specimen: Blood from Peripheral, Lower Arm, Right Updated: 12/12/24 0613     Blood Culture, Routine No Growth to date      No Growth to date    Narrative:      Right Peripheral    Blood culture [[id number]] Collected: 12/11/24 0037    Order Status: Completed Specimen: Blood from Peripheral, Wrist, Left Updated: 12/12/24 0613     Blood Culture, Routine No Growth to date      No Growth to date    Narrative:      Left Peripheral    Blood culture x two cultures. Draw prior to antibiotics. [[id number]] Collected: 12/09/24 1218    Order Status: Completed Specimen: Blood from Peripheral, Antecubital, Right Updated: 12/11/24 1412     Blood Culture, Routine No Growth to date      No Growth to date      No Growth to date    Narrative:      Aerobic and anaerobic    Blood culture x two cultures. Draw prior to antibiotics. [[id number]] Collected: 12/09/24 1239    Order Status: Completed Specimen: Blood from Peripheral, Antecubital, Right Updated: 12/11/24 1412     Blood Culture, Routine No Growth to  date      No Growth to date      No Growth to date    Narrative:      Aerobic and anaerobic

## 2024-12-12 NOTE — PROGRESS NOTES
"[facility] - Internal Medicine [facility] Medicine  Progress Note    Patient Name: Selwyn Crowder  MRN: [id number]  Patient Class: IP- Inpatient   Admission Date: 12/9/2024  Length of Stay: 3 days  Attending Physician: Rafael Whittaker*  Primary Care Provider: Melani, Primary Doctor        Subjective     Principal Problem:Cellulitis of right lower extremity        HPI:  53 yo F with PMHx HTN and chronic back pain with lumbar stenosis who presents to the ED from primary care clinic for leg swelling and fevers x a few days. Pt. Scheduled to undergo laminectomy procedure for her back pain 12/11. She was seen in medicine clinic for preoperative clearance today and vital check noted the patient tachycardic to 129, temp 101.7. Exam noted "bleeding from oval shaped wound approx. 3/4 inch in on R leg. The calf was red, swollen erythematous and tender to touch". Pt. Was referred to ED for further work-up.     Pt. Reports noticing the wound about 4 days ago with some associated fevers/chills starting last night. She does not recall any trauma to the area, but she reports that she has had a few falls over the last few weeks going up her steps at home, so she is not sure.    Overview/Hospital Course:  See problem list    Interval History: No acute events  Had draining from wound on back of leg  Afebrile    Review of Systems  Objective:     Vital Signs (Most Recent):  Temp: 97.5 °F (36.4 °C) (12/12/24 1117)  Pulse: 85 (12/12/24 1117)  Resp: 17 (12/12/24 1117)  BP: 107/71 (12/12/24 1117)  SpO2: (!) 94 % (12/12/24 1117) Vital Signs (24h Range):  Temp:  [97.5 °F (36.4 °C)-99.8 °F (37.7 °C)] 97.5 °F (36.4 °C)  Pulse:  [74-97] 85  Resp:  [16-18] 17  SpO2:  [94 %-99 %] 94 %  BP: ()/(67-84) 107/71     Weight: 119.2 kg (262 lb 12.6 oz)  Body mass index is 43.73 kg/m².    Intake/Output Summary (Last 24 hours) at 12/12/2024 1438  Last data filed at 12/12/2024 0730  Gross per 24 hour   Intake 750.31 ml   Output 1200 ml " "  Net -449.69 ml         Physical Exam  Constitutional:       Appearance: She is not toxic-appearing.   Cardiovascular:      Rate and Rhythm: Normal rate and regular rhythm.   Pulmonary:      Effort: No respiratory distress.      Breath sounds: No wheezing.   Abdominal:      General: There is no distension.      Tenderness: There is no abdominal tenderness.   Musculoskeletal:      Right lower leg: Edema (R>L size.  warmth mid calf to above knee with mild induration.  2x2 ulcer back of knee with no exudate) present.      Left lower leg: Edema (mild edema) present.   Neurological:      Mental Status: She is alert and oriented to person, place, and time.   Psychiatric:         Behavior: Behavior normal.             Significant Labs: All pertinent labs within the past 24 hours have been reviewed.    Significant Imaging: I have reviewed all pertinent imaging results/findings within the past 24 hours.    Assessment and Plan     * Cellulitis of right lower extremity  CT soft tissue shows "Skin thickening and diffuse subcutaneous edema throughout the lower leg and distal thigh, nonspecific, may represent cellulitis".  US Soft tissue found no abscesses.  IV clindamycin and bactrim both started in ED.  -- due to persistent fever clinda changed to vanc 12/11  -- Wound care consulted for treatment of open wound.       Post-nasal drip  Flonase ordered.      Elevated brain natriuretic peptide (BNP) level   in obese patient.  EKG has no pathologic tracings.  TTE WNL.  Could be due to sepsis.        Chronic hepatitis C virus infection  She is aware.  She has had it for 25 years from a tattoo and was treated but learned recently that it has recurred.  US 12/10/24: Echogenic liver parenchyma, which could reflect steatosis and/or chronic liver disease. + Splenomegaly   TTE ok.  HAV and HBV negative.    Their most current Na-MELD is listed below.  MELD 3.0: 15 at 12/11/2024  6:29 AM  MELD-Na: 8 at 12/11/2024  6:29 AM  Calculated " "from:  Serum Creatinine: 0.8 mg/dL (Using min of 1 mg/dL) at 12/11/2024  6:29 AM  Serum Sodium: 132 mmol/L at 12/11/2024  6:29 AM  Total Bilirubin: 1 mg/dL at 12/11/2024  6:29 AM  Serum Albumin: 2.2 g/dL at 12/11/2024  6:29 AM  INR(ratio): 1.2 at 12/10/2024 10:11 AM  Age at listing (hypothetical): 54 years  Sex: Female at 12/11/2024  6:29 AM    - monitor labs  -- viral panel ordered, afp  -- needs liver f/u (referral made)    Sepsis  Admission patient had fever and tachycardia.  This patient does have evidence of infective focus  My overall impression is sepsis.  Source: Skin and Soft Tissue (location leg)  Antibiotics given-   Antibiotics (72h ago, onward)      Start     Stop Route Frequency Ordered    12/11/24 1400  vancomycin 1,250 mg in 0.9% NaCl 250 mL IVPB (admixture device)         -- IV Every 12 hours (non-standard times) 12/11/24 0022    12/11/24 0107  vancomycin - pharmacy to dose  (vancomycin IVPB (PEDS and ADULTS))        Placed in "And" Linked Group    -- IV pharmacy to manage frequency 12/11/24 0007    12/10/24 1515  cefTRIAXone injection 2 g         -- IV Daily 12/10/24 1510          Source control achieved by: abx    Thrombocytopenia  The likely etiology of thrombocytopenia is sepsis and liver disease. The patients 3 most recent labs are listed below.  Recent Labs     12/10/24  0257 12/11/24  0036 12/11/24  0629   PLT 98* 108* 108*       Plan  - Will transfuse if platelet count is <10k.  - monitor CBC      Hyponatremia  Hyponatremia is likely due to Dehydration/hypovolemia and Cirrhosis. The patient's most recent sodium results are listed below.  Recent Labs     12/10/24  0257 12/11/24  0036 12/11/24  0629   * 133* 132*       Plan  - treat underlying issue  - Monitor sodium       Morbid obesity  Body mass index is 43.73 kg/m². Morbid obesity complicates all aspects of disease management from diagnostic modalities to treatment. Weight loss encouraged and health benefits explained to " "patient.         Anemia  Anemia is likely due to chronic disease due to Chronic liver disease. Most recent hemoglobin and hematocrit are listed below.  Recent Labs     12/10/24  0257 12/11/24  0036 12/11/24  0629   HGB 10.7* 10.8* 10.5*   HCT 34.5* 33.3* 31.7*       Plan  - Monitor serial CBC: Daily  - Transfuse PRBC if patient becomes hemodynamically unstable, symptomatic or H/H drops below 7/21.      Debility  Pt. Reports a few falls over the last few weeks, lives upstairs. May have led to trauma which caused current issue.   -- PT/OT assessment ordered.  Might need Home Health       HTN (hypertension)  Takes amlodipine prn at home  -- given sepsis will hold BP meds and monitor vitals    Acute deep vein thrombosis (DVT) of proximal vein of right lower extremity  Admission Pt. With R leg swelling and tenderness, U/S shows extensive thrombus "Right lower extremity occlusive thrombus involving the popliteal vein, both posterior tibial veins, both anterior tibial veins, and both peroneal veins"  -Heparin gtt started for treatment, plan to transition to PO AC once confident the cellulitis will not mature to an abscess and need drainage        Osteoarthritis of spine with radiculopathy, lumbar region  Stable  Was going to have back surgery this week but no cancelled.  -- ok for home meds  -- pt/ot        VTE Risk Mitigation (From admission, onward)           Ordered     heparin 25,000 units in dextrose 5% (100 units/ml) IV bolus from bag HIGH INTENSITY nomogram - [facility]  As needed (PRN)        Question:  Heparin Infusion Adjustment (DO NOT MODIFY ANSWER)  Answer:  [email or web address]\epic\Images\Pharmacy\HeparinInfusions\heparin HIGH INTENSITY nomogram for [facility] [id number]    12/09/24 1934     heparin 25,000 units in dextrose 5% (100 units/ml) IV bolus from bag HIGH INTENSITY nomogram - [facility]  As needed (PRN)        Question:  Heparin Infusion Adjustment (DO NOT MODIFY ANSWER)  Answer:  " [email or web address]\epic\Images\Pharmacy\HeparinInfusions\heparin HIGH INTENSITY nomogram for [facility] [id number].pdf    12/09/24 1934     IP VTE HIGH RISK PATIENT  Once         12/09/24 2322     Place sequential compression device  Until discontinued         12/09/24 2322     heparin 25,000 units in dextrose 5% 250 mL (100 units/mL) infusion HIGH INTENSITY nomogram - [facility]  Continuous        Question:  Begin at (units/kg/hr)  Answer:  18    12/09/24 1934                    Discharge Planning   TYSHAWN: 12/13/2024     Code Status: Full Code   Medical Readiness for Discharge Date:          Rafael Whittaker MD  Department of Hospital Medicine   [facility] - Internal Medicine Telemetry

## 2024-12-12 NOTE — PLAN OF CARE
Problem: Adult Inpatient Plan of Care  Goal: Plan of Care Review  Outcome: Progressing  Flowsheets (Taken 12/11/2024 1820)  Plan of Care Reviewed With: patient  Goal: Patient-Specific Goal (Individualized)  Outcome: Progressing  Goal: Absence of Hospital-Acquired Illness or Injury  Outcome: Progressing  Goal: Optimal Comfort and Wellbeing  Outcome: Progressing  Goal: Readiness for Transition of Care  Outcome: Progressing     Problem: Bariatric Environmental Safety  Goal: Safety Maintained with Care  Outcome: Progressing  Intervention: Promote Safety and Comfort  Flowsheets (Taken 12/11/2024 1111)  Bariatric Safety: (no specialty equipment utilized) other (see comments)     Problem: Sepsis/Septic Shock  Goal: Optimal Coping  Outcome: Progressing  Goal: Absence of Bleeding  Outcome: Progressing  Intervention: Monitor and Manage Bleeding  Flowsheets (Taken 12/11/2024 1111)  Bleeding Precautions: blood pressure closely monitored  Goal: Blood Glucose Level Within Targeted Range  Outcome: Progressing  Intervention: Optimize Glycemic Control  Flowsheets (Taken 12/11/2024 1111)  Glycemic Management: (labs monitored) other (see comments)  Goal: Absence of Infection Signs and Symptoms  Outcome: Progressing  Intervention: Promote Recovery  Flowsheets (Taken 12/11/2024 1111)  Sleep/Rest Enhancement: consistent schedule promoted  Goal: Optimal Nutrition Intake  Outcome: Progressing     Problem: Wound  Goal: Optimal Coping  Outcome: Progressing  Goal: Optimal Functional Ability  Outcome: Progressing  Goal: Absence of Infection Signs and Symptoms  Outcome: Progressing  Goal: Improved Oral Intake  Outcome: Progressing  Goal: Optimal Pain Control and Function  Outcome: Progressing  Intervention: Prevent or Manage Pain  Flowsheets (Taken 12/11/2024 1111)  Sleep/Rest Enhancement: consistent schedule promoted  Goal: Skin Health and Integrity  Outcome: Progressing  Goal: Optimal Wound Healing  Outcome: Progressing  Intervention:  Promote Wound Healing  Flowsheets (Taken 12/11/2024 1111)  Sleep/Rest Enhancement: consistent schedule promoted     Problem: Skin Injury Risk Increased  Goal: Skin Health and Integrity  Outcome: Progressing

## 2024-12-12 NOTE — ASSESSMENT & PLAN NOTE
"CT soft tissue shows "Skin thickening and diffuse subcutaneous edema throughout the lower leg and distal thigh, nonspecific, may represent cellulitis".  US Soft tissue found no abscesses.  IV clindamycin and bactrim both started in ED.  -- due to persistent fever clinda changed to vanc 12/11  -- Wound care consulted for treatment of open wound.     "

## 2024-12-12 NOTE — SUBJECTIVE & OBJECTIVE
Interval History: No acute events  Had draining from wound on back of leg  Afebrile    Review of Systems  Objective:     Vital Signs (Most Recent):  Temp: 97.5 °F (36.4 °C) (12/12/24 1117)  Pulse: 85 (12/12/24 1117)  Resp: 17 (12/12/24 1117)  BP: 107/71 (12/12/24 1117)  SpO2: (!) 94 % (12/12/24 1117) Vital Signs (24h Range):  Temp:  [97.5 °F (36.4 °C)-99.8 °F (37.7 °C)] 97.5 °F (36.4 °C)  Pulse:  [74-97] 85  Resp:  [16-18] 17  SpO2:  [94 %-99 %] 94 %  BP: ()/(67-84) 107/71     Weight: 119.2 kg (262 lb 12.6 oz)  Body mass index is 43.73 kg/m².    Intake/Output Summary (Last 24 hours) at 12/12/2024 1438  Last data filed at 12/12/2024 0730  Gross per 24 hour   Intake 750.31 ml   Output 1200 ml   Net -449.69 ml         Physical Exam  Constitutional:       Appearance: She is not toxic-appearing.   Cardiovascular:      Rate and Rhythm: Normal rate and regular rhythm.   Pulmonary:      Effort: No respiratory distress.      Breath sounds: No wheezing.   Abdominal:      General: There is no distension.      Tenderness: There is no abdominal tenderness.   Musculoskeletal:      Right lower leg: Edema (R>L size.  warmth mid calf to above knee with mild induration.  2x2 ulcer back of knee with no exudate) present.      Left lower leg: Edema (mild edema) present.   Neurological:      Mental Status: She is alert and oriented to person, place, and time.   Psychiatric:         Behavior: Behavior normal.             Significant Labs: All pertinent labs within the past 24 hours have been reviewed.    Significant Imaging: I have reviewed all pertinent imaging results/findings within the past 24 hours.

## 2024-12-13 LAB
APTT PPP: 32.6 SEC (ref 21–32)
APTT PPP: 46.7 SEC (ref 21–32)
CREAT SERPL-MCNC: 0.8 MG/DL (ref 0.5–1.4)
ERYTHROCYTE [DISTWIDTH] IN BLOOD BY AUTOMATED COUNT: 12.5 % (ref 11.5–14.5)
EST. GFR  (NO RACE VARIABLE): >60 ML/MIN/1.73 M^2
HCT VFR BLD AUTO: 37 % (ref 37–48.5)
HGB BLD-MCNC: 12 G/DL (ref 12–16)
MCH RBC QN AUTO: 30.4 PG (ref 27–31)
MCHC RBC AUTO-ENTMCNC: 32.4 G/DL (ref 32–36)
MCV RBC AUTO: 94 FL (ref 82–98)
PLATELET # BLD AUTO: 94 K/UL (ref 150–450)
PMV BLD AUTO: 11.6 FL (ref 9.2–12.9)
RBC # BLD AUTO: 3.95 M/UL (ref 4–5.4)
WBC # BLD AUTO: 5.71 K/UL (ref 3.9–12.7)

## 2024-12-13 PROCEDURE — 97116 GAIT TRAINING THERAPY: CPT | Mod: CQ

## 2024-12-13 PROCEDURE — 11000001 HC ACUTE MED/SURG PRIVATE ROOM

## 2024-12-13 PROCEDURE — 36415 COLL VENOUS BLD VENIPUNCTURE: CPT | Performed by: STUDENT IN AN ORGANIZED HEALTH CARE EDUCATION/TRAINING PROGRAM

## 2024-12-13 PROCEDURE — 25000242 PHARM REV CODE 250 ALT 637 W/ HCPCS: Performed by: HOSPITALIST

## 2024-12-13 PROCEDURE — 85730 THROMBOPLASTIN TIME PARTIAL: CPT | Mod: 91 | Performed by: STUDENT IN AN ORGANIZED HEALTH CARE EDUCATION/TRAINING PROGRAM

## 2024-12-13 PROCEDURE — 25000003 PHARM REV CODE 250: Performed by: HOSPITALIST

## 2024-12-13 PROCEDURE — 25000003 PHARM REV CODE 250: Performed by: STUDENT IN AN ORGANIZED HEALTH CARE EDUCATION/TRAINING PROGRAM

## 2024-12-13 PROCEDURE — 82565 ASSAY OF CREATININE: CPT | Performed by: STUDENT IN AN ORGANIZED HEALTH CARE EDUCATION/TRAINING PROGRAM

## 2024-12-13 PROCEDURE — 63600175 PHARM REV CODE 636 W HCPCS: Performed by: HOSPITALIST

## 2024-12-13 PROCEDURE — 97530 THERAPEUTIC ACTIVITIES: CPT | Mod: CQ

## 2024-12-13 PROCEDURE — 85027 COMPLETE CBC AUTOMATED: CPT | Performed by: STUDENT IN AN ORGANIZED HEALTH CARE EDUCATION/TRAINING PROGRAM

## 2024-12-13 RX ORDER — OXYCODONE AND ACETAMINOPHEN 5; 325 MG/1; MG/1
1 TABLET ORAL EVERY 4 HOURS PRN
Status: DISCONTINUED | OUTPATIENT
Start: 2024-12-13 | End: 2024-12-14 | Stop reason: HOSPADM

## 2024-12-13 RX ORDER — GUAIFENESIN 100 MG/5ML
200 SOLUTION ORAL EVERY 4 HOURS PRN
Status: DISCONTINUED | OUTPATIENT
Start: 2024-12-13 | End: 2024-12-14 | Stop reason: HOSPADM

## 2024-12-13 RX ORDER — DOXYCYCLINE HYCLATE 100 MG
100 TABLET ORAL EVERY 12 HOURS
Status: DISCONTINUED | OUTPATIENT
Start: 2024-12-13 | End: 2024-12-14 | Stop reason: HOSPADM

## 2024-12-13 RX ADMIN — DOXYCYCLINE HYCLATE 100 MG: 100 TABLET, COATED ORAL at 10:12

## 2024-12-13 RX ADMIN — APIXABAN 10 MG: 5 TABLET, FILM COATED ORAL at 09:12

## 2024-12-13 RX ADMIN — HYDROCODONE BITARTRATE AND ACETAMINOPHEN 1 TABLET: 5; 325 TABLET ORAL at 09:12

## 2024-12-13 RX ADMIN — OXYCODONE HYDROCHLORIDE AND ACETAMINOPHEN 1 TABLET: 5; 325 TABLET ORAL at 01:12

## 2024-12-13 RX ADMIN — METHOCARBAMOL 500 MG: 500 TABLET ORAL at 09:12

## 2024-12-13 RX ADMIN — VANCOMYCIN HYDROCHLORIDE 1250 MG: 1.25 INJECTION, POWDER, LYOPHILIZED, FOR SOLUTION INTRAVENOUS at 02:12

## 2024-12-13 RX ADMIN — DIPHENHYDRAMINE HYDROCHLORIDE 50 MG: 25 CAPSULE ORAL at 05:12

## 2024-12-13 RX ADMIN — OXYCODONE HYDROCHLORIDE AND ACETAMINOPHEN 1 TABLET: 5; 325 TABLET ORAL at 05:12

## 2024-12-13 RX ADMIN — DOXYCYCLINE HYCLATE 100 MG: 100 TABLET, COATED ORAL at 09:12

## 2024-12-13 RX ADMIN — METHOCARBAMOL 500 MG: 500 TABLET ORAL at 04:12

## 2024-12-13 RX ADMIN — GABAPENTIN 400 MG: 300 CAPSULE ORAL at 09:12

## 2024-12-13 RX ADMIN — DIPHENHYDRAMINE HYDROCHLORIDE 50 MG: 25 CAPSULE ORAL at 10:12

## 2024-12-13 RX ADMIN — GUAIFENESIN 200 MG: 200 SOLUTION ORAL at 09:12

## 2024-12-13 RX ADMIN — APIXABAN 10 MG: 5 TABLET, FILM COATED ORAL at 10:12

## 2024-12-13 RX ADMIN — OXYCODONE HYDROCHLORIDE AND ACETAMINOPHEN 1 TABLET: 5; 325 TABLET ORAL at 10:12

## 2024-12-13 RX ADMIN — GABAPENTIN 400 MG: 300 CAPSULE ORAL at 04:12

## 2024-12-13 RX ADMIN — CEFTRIAXONE 2 G: 2 INJECTION, POWDER, FOR SOLUTION INTRAMUSCULAR; INTRAVENOUS at 09:12

## 2024-12-13 RX ADMIN — FLUTICASONE PROPIONATE 100 MCG: 50 SPRAY, METERED NASAL at 09:12

## 2024-12-13 NOTE — PROGRESS NOTES
CM spoke to pt at bedside with permission.  Role of CM as part of the interdisciplinary team and discharge planning explained.  Discharge assessment continued.  (See initial assessment)    Admitted from home  Lives with mother  PLOF independent  PCP none    Pharmacy [facility]  Emergency decision maker Sasha Crowder [phone]    Pt's BC insurance may be inactive and she has no PCP.  Pt verbalized understanding that she may discharge with no HH service and may have to pay out of pocket for DME due to these factors.       Discharge Plan A and Plan B have been determined by review of patient's clinical status, future medical and therapeutic needs, and coverage/benefits for post-acute care in coordination with multidisciplinary team members.

## 2024-12-13 NOTE — PROGRESS NOTES
"[facility] - Internal Medicine [facility] Medicine  Progress Note    Patient Name: Selwyn Crowder  MRN: [id number]  Patient Class: IP- Inpatient   Admission Date: 12/9/2024  Length of Stay: 4 days  Attending Physician: Rafael Whittaker*  Primary Care Provider: Melani, Primary Doctor        Subjective     Principal Problem:Cellulitis of right lower extremity        HPI:  53 yo F with PMHx HTN and chronic back pain with lumbar stenosis who presents to the ED from primary care clinic for leg swelling and fevers x a few days. Pt. Scheduled to undergo laminectomy procedure for her back pain 12/11. She was seen in medicine clinic for preoperative clearance today and vital check noted the patient tachycardic to 129, temp 101.7. Exam noted "bleeding from oval shaped wound approx. 3/4 inch in on R leg. The calf was red, swollen erythematous and tender to touch". Pt. Was referred to ED for further work-up.     Pt. Reports noticing the wound about 4 days ago with some associated fevers/chills starting last night. She does not recall any trauma to the area, but she reports that she has had a few falls over the last few weeks going up her steps at home, so she is not sure.    Overview/Hospital Course:      Interval History: No acute events. Afebrile      Review of Systems  Objective:     Vital Signs (Most Recent):  Temp: 98.8 °F (37.1 °C) (12/13/24 1205)  Pulse: 84 (12/13/24 1205)  Resp: 18 (12/13/24 1205)  BP: 135/84 (12/13/24 1205)  SpO2: 99 % (12/13/24 1205) Vital Signs (24h Range):  Temp:  [98.2 °F (36.8 °C)-99.5 °F (37.5 °C)] 98.8 °F (37.1 °C)  Pulse:  [82-99] 84  Resp:  [16-18] 18  SpO2:  [95 %-99 %] 99 %  BP: (114-135)/(74-84) 135/84     Weight: 119.2 kg (262 lb 12.6 oz)  Body mass index is 43.73 kg/m².    Intake/Output Summary (Last 24 hours) at 12/13/2024 1245  Last data filed at 12/13/2024 0800  Gross per 24 hour   Intake 240 ml   Output 800 ml   Net -560 ml         Physical Exam  Constitutional:       " "Appearance: She is not toxic-appearing.   Cardiovascular:      Rate and Rhythm: Normal rate and regular rhythm.   Pulmonary:      Effort: No respiratory distress.      Breath sounds: No wheezing.   Abdominal:      General: There is no distension.      Tenderness: There is no abdominal tenderness.   Musculoskeletal:      Right lower leg: Edema (R>L size.  warmth mid calf to above knee with mild induration.  2x2 ulcer back of knee with no exudate) present.      Left lower leg: Edema (mild edema) present.   Neurological:      Mental Status: She is alert and oriented to person, place, and time.   Psychiatric:         Behavior: Behavior normal.             Significant Labs: All pertinent labs within the past 24 hours have been reviewed.    Significant Imaging: I have reviewed all pertinent imaging results/findings within the past 24 hours.    Assessment and Plan     * Cellulitis of right lower extremity  CT soft tissue shows "Skin thickening and diffuse subcutaneous edema throughout the lower leg and distal thigh, nonspecific, may represent cellulitis".  US Soft tissue found no abscesses.  IV clindamycin and bactrim both started in ED.  -- due to persistent fever clinda changed to vanc 12/11  -- Wound care consulted for treatment of open wound.  -- no growth on cultures   -- transition to doxycycline      Post-nasal drip  Flonase ordered.      Elevated brain natriuretic peptide (BNP) level   in obese patient.  EKG has no pathologic tracings.  TTE WNL.  Could be due to sepsis.        Chronic hepatitis C virus infection  She is aware.  She has had it for 25 years from a tattoo and was treated but learned recently that it has recurred.  US 12/10/24: Echogenic liver parenchyma, which could reflect steatosis and/or chronic liver disease. + Splenomegaly   TTE ok.  HAV and HBV negative.    Their most current Na-MELD is listed below.  MELD 3.0: 15 at 12/11/2024  6:29 AM  MELD-Na: 8 at 12/11/2024  6:29 AM  Calculated " from:  Serum Creatinine: 0.8 mg/dL (Using min of 1 mg/dL) at 12/11/2024  6:29 AM  Serum Sodium: 132 mmol/L at 12/11/2024  6:29 AM  Total Bilirubin: 1 mg/dL at 12/11/2024  6:29 AM  Serum Albumin: 2.2 g/dL at 12/11/2024  6:29 AM  INR(ratio): 1.2 at 12/10/2024 10:11 AM  Age at listing (hypothetical): 54 years  Sex: Female at 12/11/2024  6:29 AM    - monitor labs  -- viral panel ordered, afp  -- needs liver f/u (referral made)    Sepsis  Admission patient had fever and tachycardia.  This patient does have evidence of infective focus  My overall impression is sepsis.  Source: Skin and Soft Tissue (location leg)  Antibiotics given-   Antibiotics (72h ago, onward)      Start     Stop Route Frequency Ordered    12/13/24 1115  doxycycline tablet 100 mg         -- Oral Every 12 hours 12/13/24 1003          Source control achieved by: abx    Thrombocytopenia  The likely etiology of thrombocytopenia is sepsis and liver disease. The patients 3 most recent labs are listed below.  Recent Labs     12/10/24  0257 12/11/24  0036 12/11/24  0629   PLT 98* 108* 108*       Plan  - Will transfuse if platelet count is <10k.  - monitor CBC      Hyponatremia  Hyponatremia is likely due to Dehydration/hypovolemia and Cirrhosis. The patient's most recent sodium results are listed below.  Recent Labs     12/11/24  0036 12/11/24  0629 12/12/24  0529   * 132* 133*       Plan  - treat underlying issue  - Monitor sodium       Morbid obesity  Body mass index is 43.73 kg/m². Morbid obesity complicates all aspects of disease management from diagnostic modalities to treatment. Weight loss encouraged and health benefits explained to patient.         Anemia  Anemia is likely due to chronic disease due to Chronic liver disease. Most recent hemoglobin and hematocrit are listed below.  Recent Labs     12/10/24  0257 12/11/24  0036 12/11/24  0629   HGB 10.7* 10.8* 10.5*   HCT 34.5* 33.3* 31.7*       Plan  - Monitor serial CBC: Daily  - Transfuse PRBC  "if patient becomes hemodynamically unstable, symptomatic or H/H drops below 7/21.      Debility  Pt. Reports a few falls over the last few weeks, lives upstairs. May have led to trauma which caused current issue.   -- PT/OT assessment ordered.  Might need Home Health       HTN (hypertension)  Takes amlodipine prn at home  -- given sepsis will hold BP meds and monitor vitals    Acute deep vein thrombosis (DVT) of proximal vein of right lower extremity  Admission Pt. With R leg swelling and tenderness, U/S shows extensive thrombus "Right lower extremity occlusive thrombus involving the popliteal vein, both posterior tibial veins, both anterior tibial veins, and both peroneal veins"  -Heparin gtt started for treatment, plan to transition to PO AC once confident the cellulitis will not mature to an abscess and need drainage        Osteoarthritis of spine with radiculopathy, lumbar region  Stable  Was going to have back surgery this week but no cancelled.  -- ok for home meds  -- pt/ot        VTE Risk Mitigation (From admission, onward)           Ordered     apixaban tablet 5 mg  2 times daily        Placed in "Followed by" Linked Group    12/13/24 1003     apixaban tablet 10 mg  2 times daily        Placed in "Followed by" Linked Group    12/13/24 1003     IP VTE HIGH RISK PATIENT  Once         12/09/24 2322     Place sequential compression device  Until discontinued         12/09/24 2322                    Discharge Planning   TYSHAWN: 12/13/2024     Code Status: Full Code   Medical Readiness for Discharge Date:                  Rafael Whittaker MD  Department of Hospital Medicine   [facility] - Internal Medicine Telemetry    "

## 2024-12-13 NOTE — ASSESSMENT & PLAN NOTE
Hyponatremia is likely due to Dehydration/hypovolemia and Cirrhosis. The patient's most recent sodium results are listed below.  Recent Labs     12/11/24  0036 12/11/24  0629 12/12/24  0529   * 132* 133*       Plan  - treat underlying issue  - Monitor sodium

## 2024-12-13 NOTE — ASSESSMENT & PLAN NOTE
"CT soft tissue shows "Skin thickening and diffuse subcutaneous edema throughout the lower leg and distal thigh, nonspecific, may represent cellulitis".  US Soft tissue found no abscesses.  IV clindamycin and bactrim both started in ED.  -- due to persistent fever clinda changed to vanc 12/11  -- Wound care consulted for treatment of open wound.  -- no growth on cultures   -- transition to doxycycline    "

## 2024-12-13 NOTE — ASSESSMENT & PLAN NOTE
Admission patient had fever and tachycardia.  This patient does have evidence of infective focus  My overall impression is sepsis.  Source: Skin and Soft Tissue (location leg)  Antibiotics given-   Antibiotics (72h ago, onward)    Start     Stop Route Frequency Ordered    12/13/24 1115  doxycycline tablet 100 mg         -- Oral Every 12 hours 12/13/24 1003        Source control achieved by: hilary

## 2024-12-13 NOTE — PROGRESS NOTES
Pharmacokinetic Assessment Follow Up: IV Vancomycin    Vancomycin order has been discontinued. Pharmacy will sign off. Please reconsult as needed! Thank you!    Please contact pharmacy for questions regarding this assessment.    Thank you for the consult,   Jeff Wheat

## 2024-12-13 NOTE — SUBJECTIVE & OBJECTIVE
Interval History: No acute events. Afebrile      Review of Systems  Objective:     Vital Signs (Most Recent):  Temp: 98.8 °F (37.1 °C) (12/13/24 1205)  Pulse: 84 (12/13/24 1205)  Resp: 18 (12/13/24 1205)  BP: 135/84 (12/13/24 1205)  SpO2: 99 % (12/13/24 1205) Vital Signs (24h Range):  Temp:  [98.2 °F (36.8 °C)-99.5 °F (37.5 °C)] 98.8 °F (37.1 °C)  Pulse:  [82-99] 84  Resp:  [16-18] 18  SpO2:  [95 %-99 %] 99 %  BP: (114-135)/(74-84) 135/84     Weight: 119.2 kg (262 lb 12.6 oz)  Body mass index is 43.73 kg/m².    Intake/Output Summary (Last 24 hours) at 12/13/2024 1245  Last data filed at 12/13/2024 0800  Gross per 24 hour   Intake 240 ml   Output 800 ml   Net -560 ml         Physical Exam  Constitutional:       Appearance: She is not toxic-appearing.   Cardiovascular:      Rate and Rhythm: Normal rate and regular rhythm.   Pulmonary:      Effort: No respiratory distress.      Breath sounds: No wheezing.   Abdominal:      General: There is no distension.      Tenderness: There is no abdominal tenderness.   Musculoskeletal:      Right lower leg: Edema (R>L size.  warmth mid calf to above knee with mild induration.  2x2 ulcer back of knee with no exudate) present.      Left lower leg: Edema (mild edema) present.   Neurological:      Mental Status: She is alert and oriented to person, place, and time.   Psychiatric:         Behavior: Behavior normal.             Significant Labs: All pertinent labs within the past 24 hours have been reviewed.    Significant Imaging: I have reviewed all pertinent imaging results/findings within the past 24 hours.

## 2024-12-13 NOTE — PT/OT/SLP PROGRESS
Physical Therapy Treatment    Patient Name:  Selwyn Crowder   MRN:  [id number]    Recommendations:     Discharge Recommendations: Low Intensity Therapy  Discharge Equipment Recommendations: walker, rolling, 3-in-1 commode, bath bench, wheelchair  Barriers to discharge:  none    Assessment:     Selwyn Crowder is a 54 y.o. female admitted with a medical diagnosis of Cellulitis of right lower extremity.  She presents with the following impairments/functional limitations: weakness, impaired endurance, impaired self care skills, impaired functional mobility, pain, decreased safety awareness, decreased lower extremity function, decreased upper extremity function, impaired cardiopulmonary response to activity, edema, impaired skin Pt tolerated treatment session well today. Pt requiring little to no assistance for transfers, gait training and stair training. Pt was able to increase distance ambulated and tolerated stair training well today. Pt can still benefit from strengthening and endurance training. Patient remains appropriate for continued skilled services within the acute environment and goals remain appropriate.   .    Rehab Prognosis: Good; patient would benefit from acute skilled PT services to address these deficits and reach maximum level of function.    Recent Surgery: * No surgery found *      Plan:     During this hospitalization, patient to be seen 4 x/week to address the identified rehab impairments via gait training, therapeutic activities, therapeutic exercises, neuromuscular re-education and progress toward the following goals:    Plan of Care Expires:  01/09/25    Subjective     Chief Complaint: R leg pain   Patient/Family Comments/goals: Pt agreeable to PT   Pain/Comfort:  Pain Rating 1: 5/10  Location - Side 1: Right  Location - Orientation 1: generalized  Location 1: leg  Pain Addressed 1: Reposition, Distraction  Pain Rating Post-Intervention 1:  (not rated)      Objective:     Communicated  with Rn prior to session.  Patient found up in chair with telemetry upon PT entry to room.     General Precautions: Standard, fall  Orthopedic Precautions: N/A  Braces: N/A  Respiratory Status: Room air     Functional Mobility:  Transfers:     Sit to Stand x 2:  contact guard assistance with rolling walker  Gait: 80 ft CGA with RW   Pt ambulated with decreased harrison, step length and antalgic gait  Stairs:  Pt ascended/descended 12 stair(s) with No Assistive Device with right handrail with CGA regressing to Mehnaz due to increased weakness and fatigue             AM-PAC 6 CLICK MOBILITY  Turning over in bed (including adjusting bedclothes, sheets and blankets)?: 3  Sitting down on and standing up from a chair with arms (e.g., wheelchair, bedside commode, etc.): 3  Moving from lying on back to sitting on the side of the bed?: 3  Moving to and from a bed to a chair (including a wheelchair)?: 3  Need to walk in hospital room?: 3  Climbing 3-5 steps with a railing?: 3  Basic Mobility Total Score: 18       Treatment & Education:  Therapist provided instruction and educated for safety during transfers and gait training. As well as proper body mechanics, energy conservation, and fall prevention strategies during tasks listed above, and the effects of prolonged immobility and the importance of performing EOB/OOB activity and exercises to promote healing and reduce recovery time.       Patient left up in chair with all lines intact, call button in reach, and Rn notified..    GOALS:   Multidisciplinary Problems       Physical Therapy Goals          Problem: Physical Therapy    Goal Priority Disciplines Outcome Interventions   Physical Therapy Goal     PT, PT/OT Progressing    Description: Goals to be met by: 2025     Patient will increase functional independence with mobility by performin. Sit to stand transfer with Supervision  2. Bed to chair transfer with Stand-by Assistance using LRAD  3. Gait  x 150 feet with  Stand-by Assistance using LRAD..   4. Ascend/descend flight of stairs with bilateral Handrails Contact Guard Assistance using No Assistive Device.                          Time Tracking:     PT Received On: 12/13/24  PT Start Time: 1020     PT Stop Time: 1043  PT Total Time (min): 23 min     Billable Minutes: Gait Training 15 and Therapeutic Activity 8    Treatment Type: Treatment  PT/PTA: PTA     Number of PTA visits since last PT visit: 2     12/13/2024

## 2024-12-14 VITALS
DIASTOLIC BLOOD PRESSURE: 54 MMHG | SYSTOLIC BLOOD PRESSURE: 111 MMHG | BODY MASS INDEX: 43.79 KG/M2 | HEIGHT: 65 IN | HEART RATE: 79 BPM | OXYGEN SATURATION: 98 % | TEMPERATURE: 98 F | RESPIRATION RATE: 16 BRPM | WEIGHT: 262.81 LBS

## 2024-12-14 LAB
BACTERIA BLD CULT: NORMAL
BACTERIA BLD CULT: NORMAL

## 2024-12-14 PROCEDURE — 25000003 PHARM REV CODE 250: Performed by: HOSPITALIST

## 2024-12-14 PROCEDURE — 25000242 PHARM REV CODE 250 ALT 637 W/ HCPCS: Performed by: HOSPITALIST

## 2024-12-14 PROCEDURE — 25000003 PHARM REV CODE 250: Performed by: STUDENT IN AN ORGANIZED HEALTH CARE EDUCATION/TRAINING PROGRAM

## 2024-12-14 RX ORDER — DOXYCYCLINE HYCLATE 100 MG
100 TABLET ORAL EVERY 12 HOURS
Qty: 14 TABLET | Refills: 0 | Status: SHIPPED | OUTPATIENT
Start: 2024-12-14 | End: 2024-12-21

## 2024-12-14 RX ORDER — OXYCODONE AND ACETAMINOPHEN 5; 325 MG/1; MG/1
1 TABLET ORAL EVERY 4 HOURS PRN
Qty: 30 TABLET | Refills: 0 | Status: SHIPPED | OUTPATIENT
Start: 2024-12-14

## 2024-12-14 RX ADMIN — GABAPENTIN 400 MG: 300 CAPSULE ORAL at 08:12

## 2024-12-14 RX ADMIN — OXYCODONE HYDROCHLORIDE AND ACETAMINOPHEN 1 TABLET: 5; 325 TABLET ORAL at 08:12

## 2024-12-14 RX ADMIN — DOXYCYCLINE HYCLATE 100 MG: 100 TABLET, COATED ORAL at 08:12

## 2024-12-14 RX ADMIN — OXYCODONE HYDROCHLORIDE AND ACETAMINOPHEN 1 TABLET: 5; 325 TABLET ORAL at 12:12

## 2024-12-14 RX ADMIN — APIXABAN 10 MG: 5 TABLET, FILM COATED ORAL at 08:12

## 2024-12-14 RX ADMIN — GUAIFENESIN 200 MG: 200 SOLUTION ORAL at 08:12

## 2024-12-14 RX ADMIN — METHOCARBAMOL 500 MG: 500 TABLET ORAL at 08:12

## 2024-12-14 RX ADMIN — METHOCARBAMOL 500 MG: 500 TABLET ORAL at 02:12

## 2024-12-14 RX ADMIN — GABAPENTIN 400 MG: 300 CAPSULE ORAL at 02:12

## 2024-12-14 RX ADMIN — OXYCODONE HYDROCHLORIDE AND ACETAMINOPHEN 1 TABLET: 5; 325 TABLET ORAL at 04:12

## 2024-12-14 RX ADMIN — GUAIFENESIN 200 MG: 200 SOLUTION ORAL at 12:12

## 2024-12-14 RX ADMIN — DIPHENHYDRAMINE HYDROCHLORIDE 50 MG: 25 CAPSULE ORAL at 08:12

## 2024-12-14 RX ADMIN — FLUTICASONE PROPIONATE 100 MCG: 50 SPRAY, METERED NASAL at 08:12

## 2024-12-14 NOTE — PLAN OF CARE
[facility] - Internal Medicine Telemetry      HOME HEALTH ORDERS  FACE TO FACE ENCOUNTER    Patient Name: Selwyn Crowder  YOB: 1970    PCP: No, Primary Doctor   PCP Address: None  PCP Phone Number: None  PCP Fax: None    Encounter Date: 12/9/24    Admit to Home Health    Diagnoses:  Active Hospital Problems    Diagnosis  POA    *Cellulitis of right lower extremity [L03.115]  Yes    Post-nasal drip [R09.82]  Yes    Debility [R53.81]  Yes    Anemia [D64.9]  Yes    Morbid obesity [E66.01]  Yes    Hyponatremia [E87.1]  Yes    Thrombocytopenia [D69.6]  Yes    Sepsis [A41.9]  Yes    Chronic hepatitis C virus infection [B18.2]  Yes    Elevated brain natriuretic peptide (BNP) level [R79.89]  Yes    Acute deep vein thrombosis (DVT) of proximal vein of right lower extremity [I82.4Y1]  Yes    HTN (hypertension) [I10]  Yes    Osteoarthritis of spine with radiculopathy, lumbar region [M47.26]  Yes      Resolved Hospital Problems   No resolved problems to display.       Follow Up Appointments:  Future Appointments   Date Time Provider Department Center   1/3/2025 12:00 PM [facility]       Allergies:Review of patient's allergies indicates:  No Known Allergies    Medications: Review discharge medications with patient and family and provide education.    Current Facility-Administered Medications   Medication Dose Route Frequency Provider Last Rate Last Admin    acetaminophen tablet 650 mg  650 mg Oral Q4H PRN Chaparro Nassar MD   650 mg at 12/11/24 2033    apixaban tablet 10 mg  10 mg Oral BID Rafael Whittaker MD   10 mg at 12/14/24 0837    Followed by    [START ON 12/20/2024] apixaban tablet 5 mg  5 mg Oral BID Rafael Whittaker MD        benzonatate capsule 100 mg  100 mg Oral TID PRN Rafael Whittaker MD   100 mg at 12/12/24 1650    dextrose 10% bolus 125 mL 125 mL  12.5 g Intravenous PRN Chaparro Nassar MD        dextrose 10% bolus 250  mL 250 mL  25 g Intravenous PRChaparro Frederick MD        diphenhydrAMINE capsule 50 mg  50 mg Oral Q6H PRN Ludwig Gray MD   50 mg at 12/14/24 0838    doxycycline tablet 100 mg  100 mg Oral Q12H Rafael Whittaker MD   100 mg at 12/14/24 0837    fluticasone propionate 50 mcg/actuation nasal spray 100 mcg  2 spray Each Nostril Daily Ludwig Gray MD   100 mcg at 12/14/24 0838    gabapentin capsule 400 mg  400 mg Oral TID Chaparro Nassar MD   400 mg at 12/14/24 0837    glucagon (human recombinant) injection 1 mg  1 mg Intramuscular PRChaparro Frederick MD        glucose chewable tablet 16 g  16 g Oral Chaparro Haines MD        glucose chewable tablet 24 g  24 g Oral Chaparro Haines MD        guaiFENesin 100 mg/5 ml syrup 200 mg  200 mg Oral Q4H PRN Rafael Whittaker MD   200 mg at 12/14/24 0838    influenza (Flulaval, Fluzone, Fluarix) 45 mcg/0.5 mL IM vaccine (> or = 6 mo) 0.5 mL  0.5 mL Intramuscular vaccine x 1 dose Ludwig Gray MD        melatonin tablet 6 mg  6 mg Oral Nightly PRChaparro Frederick MD        methocarbamoL tablet 500 mg  500 mg Oral TID Chaparro Nassar MD   500 mg at 12/14/24 0837    naloxone 0.4 mg/mL injection 0.02 mg  0.02 mg Intravenous PRChaparro Frederick MD        ondansetron injection 4 mg  4 mg Intravenous Q8H Chaparro Haines MD        oxyCODONE-acetaminophen 5-325 mg per tablet 1 tablet  1 tablet Oral Q4H PRRafael Kerns MD   1 tablet at 12/14/24 0838    prochlorperazine injection Soln 5 mg  5 mg Intravenous Q6H PRChaparro Frederick MD        sodium chloride 0.9% flush 10 mL  10 mL Intravenous PRChaparro Frederick MD            Medication List        START taking these medications      apixaban 5 mg Tab  Commonly known as: ELIQUIS  Take 2 tablets (10 mg total) by mouth 2 (two) times daily for 6 days, THEN 1 tablet (5 mg total) 2 (two) times daily.  Start taking on: December 14, 2024     doxycycline 100 MG  tablet  Commonly known as: VIBRA-TABS  Take 1 tablet (100 mg total) by mouth every 12 (twelve) hours. for 7 days     oxyCODONE-acetaminophen 5-325 mg per tablet  Commonly known as: PERCOCET  Take 1 tablet by mouth every 4 (four) hours as needed for Pain.            CONTINUE taking these medications      benzonatate 100 MG capsule  Commonly known as: TESSALON  Take 1 capsule (100 mg total) by mouth 3 (three) times daily as needed for Cough.     diclofenac 50 MG EC tablet  Commonly known as: VOLTAREN  Take 1 tablet (50 mg total) by mouth 3 (three) times daily as needed (Pain).     gabapentin 400 MG capsule  Commonly known as: NEURONTIN  Take 1 capsule (400 mg total) by mouth 3 (three) times daily. Slowly titrate dose. Start 1 pill nightly x 2 days, then one pill morning and night for the following 2 days, then one pill 3x daily     methocarbamoL 500 MG Tab  Commonly known as: ROBAXIN  Take 1-2 tablets 3 times a day as needed for back pain/spasms     promethazine-dextromethorphan 6.25-15 mg/5 mL Syrp  Commonly known as: PROMETHAZINE-DM  Take 5 mLs by mouth every 4 (four) hours as needed (cough).            STOP taking these medications      amLODIPine 5 MG tablet  Commonly known as: NORVASC                I have seen and examined this patient within the last 30 days. My clinical findings that support the need for the home health skilled services and home bound status are the following:no   Weakness/numbness causing balance and gait disturbance due to Weakness/Debility making it taxing to leave home.     Diet:   cardiac diet    Labs:  Report Lab results to PCP.    Referrals/ Consults  Physical Therapy to evaluate and treat. Evaluate for home safety and equipment needs; Establish/upgrade home exercise program. Perform / instruct on therapeutic exercises, gait training, transfer training, and Range of Motion.  Occupational Therapy to evaluate and treat. Evaluate home environment for safety and equipment needs.  Perform/Instruct on transfers, ADL training, ROM, and therapeutic exercises.    Activities:   activity as tolerated    Nursing:   Agency to admit patient within 24 hours of hospital discharge unless specified on physician order or at patient request    SN to complete comprehensive assessment including routine vital signs. Instruct on disease process and s/s of complications to report to MD. Review/verify medication list sent home with the patient at time of discharge  and instruct patient/caregiver as needed. Frequency may be adjusted depending on start of care date.     Skilled nurse to perform up to 3 visits PRN for symptoms related to diagnosis    Notify MD if SBP > 160 or < 90; DBP > 90 or < 50; HR > 120 or < 50; Temp > 101; O2 < 88%    Ok to schedule additional visits based on staff availability and patient request on consecutive days within the home health episode.    When multiple disciplines ordered:    Start of Care occurs on Sunday - Wednesday schedule remaining discipline evaluations as ordered on separate consecutive days following the start of care.    Thursday SOC -schedule subsequent evaluations Friday and Monday the following week.     Friday - Saturday SOC - schedule subsequent discipline evaluations on consecutive days starting Monday of the following week.    For all post-discharge communication and subsequent orders please contact patient's primary care physician.     Miscellaneous   Routine Skin for Bedridden Patients: Instruct patient/caregiver to apply moisture barrier cream to all skin folds and wet areas in perineal area daily and after baths and all bowel movements.    Home Health Aide:  Nursing Three times weekly, Physical Therapy Three times weekly, and Occupational Therapy Three times weekly    Wound Care Orders    Right popliteal ulceration: q2d and PRN strikethrough drainage  Cleanse w/Vashe, apply Aquacel AG to wound bed, cover with foam or island border.    I certify that this  patient is confined to her home and needs intermittent skilled nursing care, physical therapy, and occupational therapy.

## 2024-12-15 NOTE — NURSING
Pt left via w/c to home in NAD.  IV and heart monitor removed.  Belongings secured with pt and family.  DME walker delivered to room before dc.  Medications delivered to room via bedside pharmacy.  DC instructions, follow-up, and medication changes discussed with pt and family.  Demonstrated and explained to pt how to change R leg dressing per WOC orders.  Wound care supplies provided to pt before discharge.  Verbalized understanding and had no further questions.

## 2024-12-15 NOTE — PLAN OF CARE
Patient received RW from [facility] to bedside. CM expected to get a cost transfer for $27 emailed on Monday to cover item. Pt received wound care instructions from nursing and given wound care supplies at bedside by nursing. Pt discharged to home.    EBER MuirN, RN, [facility]  Transitional Care Manager  [phone]  [email or web address]    [facility] - Internal Medicine Telemetry  Discharge Final Note    Primary Care Provider: No, Primary Doctor    Expected Discharge Date: 12/14/2024    Final Discharge Note (most recent)       Final Note - 12/14/24 1902          Final Note    Assessment Type Final Discharge Note     Anticipated Discharge Disposition Home or Self Care        Post-Acute Status    Post-Acute Authorization HME     E Status Set-up Complete/Auth obtained     Discharge Delays None known at this time                     Important Message from Medicare             Contact Info       [facility]   Specialty: Hepatology    [address]  [facility] [address]   Phone: [phone]       Next Steps: Schedule an appointment as soon as possible for a visit in 2 week(s)

## 2024-12-16 LAB
BACTERIA BLD CULT: NORMAL
BACTERIA BLD CULT: NORMAL

## 2024-12-18 ENCOUNTER — PATIENT OUTREACH (OUTPATIENT)
Dept: ADMINISTRATIVE | Facility: CLINIC | Age: 54
End: 2024-12-18
Payer: MEDICAID

## 2024-12-18 NOTE — PROGRESS NOTES
C3 nurse spoke with Selwyn Crowder  for a TCC post hospital discharge follow up call. The patient does not have a scheduled HOSFU appointment with No, Primary Doctor  within 5-7 days post hospital discharge date 12/14/2024. C3 nurse was unable to schedule HOSFU appointment in [facility].    PATIENT INSTRUCTED TO Please contact PCP and schedule follow up appointment using HOSFU visit type on or before 12/21/2024.

## 2024-12-30 RX ORDER — GABAPENTIN 400 MG/1
400 CAPSULE ORAL 3 TIMES DAILY
Qty: 90 CAPSULE | Refills: 2 | Status: SHIPPED | OUTPATIENT
Start: 2024-12-30

## 2025-01-07 RX ORDER — DICLOFENAC SODIUM 50 MG/1
50 TABLET, DELAYED RELEASE ORAL 3 TIMES DAILY PRN
Qty: 60 TABLET | Refills: 0 | Status: SHIPPED | OUTPATIENT
Start: 2025-01-07

## 2025-01-07 RX ORDER — METHOCARBAMOL 500 MG/1
TABLET, FILM COATED ORAL
Qty: 60 TABLET | Refills: 0 | Status: SHIPPED | OUTPATIENT
Start: 2025-01-07

## (undated) DEVICE — CHLORAPREP 10.5 ML APPLICATOR

## (undated) DEVICE — NDL SPINAL 22GX5

## (undated) DEVICE — NDL TUOHY EPIDURAL 20G X 3.5

## (undated) DEVICE — SYS LABEL CORRECT MED

## (undated) DEVICE — TUBING MINIBORE EXTENSION

## (undated) DEVICE — GLOVE SENSICARE PI GRN 7.5

## (undated) DEVICE — TOWEL OR DISP STRL BLUE 4/PK

## (undated) DEVICE — SYR GLASS 5CC LUER LOK